# Patient Record
Sex: FEMALE | Race: WHITE | NOT HISPANIC OR LATINO | Employment: FULL TIME | URBAN - METROPOLITAN AREA
[De-identification: names, ages, dates, MRNs, and addresses within clinical notes are randomized per-mention and may not be internally consistent; named-entity substitution may affect disease eponyms.]

---

## 2023-09-25 ENCOUNTER — HOSPITAL ENCOUNTER (EMERGENCY)
Facility: HOSPITAL | Age: 28
Discharge: HOME | End: 2023-09-25
Attending: EMERGENCY MEDICINE
Payer: COMMERCIAL

## 2023-09-25 VITALS
RESPIRATION RATE: 18 BRPM | OXYGEN SATURATION: 97 % | HEIGHT: 65 IN | TEMPERATURE: 98 F | HEART RATE: 72 BPM | DIASTOLIC BLOOD PRESSURE: 87 MMHG | SYSTOLIC BLOOD PRESSURE: 128 MMHG | BODY MASS INDEX: 32.99 KG/M2 | WEIGHT: 198 LBS

## 2023-09-25 DIAGNOSIS — M54.6 ACUTE BILATERAL THORACIC BACK PAIN: ICD-10-CM

## 2023-09-25 DIAGNOSIS — R10.13 EPIGASTRIC PAIN: Primary | ICD-10-CM

## 2023-09-25 LAB
ALBUMIN SERPL BCP-MCNC: 4.7 G/DL (ref 3.5–5.2)
ALP SERPL-CCNC: 52 U/L (ref 55–135)
ALT SERPL W/O P-5'-P-CCNC: 15 U/L (ref 10–44)
ANION GAP SERPL CALC-SCNC: 8 MMOL/L (ref 8–16)
AST SERPL-CCNC: 15 U/L (ref 10–40)
B-HCG UR QL: NEGATIVE
BASOPHILS # BLD AUTO: 0.04 K/UL (ref 0–0.2)
BASOPHILS NFR BLD: 0.4 % (ref 0–1.9)
BILIRUB SERPL-MCNC: 0.5 MG/DL (ref 0.1–1)
BILIRUB UR QL STRIP: NEGATIVE
BUN SERPL-MCNC: 18 MG/DL (ref 6–20)
CALCIUM SERPL-MCNC: 9.2 MG/DL (ref 8.7–10.5)
CHLORIDE SERPL-SCNC: 103 MMOL/L (ref 95–110)
CLARITY UR: CLEAR
CO2 SERPL-SCNC: 26 MMOL/L (ref 23–29)
COLOR UR: YELLOW
CREAT SERPL-MCNC: 0.7 MG/DL (ref 0.5–1.4)
CREAT SERPL-MCNC: 1 MG/DL (ref 0.5–1.4)
CTP QC/QA: YES
DIFFERENTIAL METHOD: NORMAL
EOSINOPHIL # BLD AUTO: 0.1 K/UL (ref 0–0.5)
EOSINOPHIL NFR BLD: 1.5 % (ref 0–8)
ERYTHROCYTE [DISTWIDTH] IN BLOOD BY AUTOMATED COUNT: 12.2 % (ref 11.5–14.5)
EST. GFR  (NO RACE VARIABLE): >60 ML/MIN/1.73 M^2
GLUCOSE SERPL-MCNC: 115 MG/DL (ref 70–110)
GLUCOSE UR QL STRIP: NEGATIVE
HCT VFR BLD AUTO: 41.1 % (ref 37–48.5)
HGB BLD-MCNC: 14.2 G/DL (ref 12–16)
HGB UR QL STRIP: NEGATIVE
IMM GRANULOCYTES # BLD AUTO: 0.03 K/UL (ref 0–0.04)
IMM GRANULOCYTES NFR BLD AUTO: 0.3 % (ref 0–0.5)
KETONES UR QL STRIP: NEGATIVE
LACTATE SERPL-SCNC: 0.5 MMOL/L (ref 0.5–1.9)
LEUKOCYTE ESTERASE UR QL STRIP: NEGATIVE
LIPASE SERPL-CCNC: 34 U/L (ref 4–60)
LYMPHOCYTES # BLD AUTO: 1.7 K/UL (ref 1–4.8)
LYMPHOCYTES NFR BLD: 18.5 % (ref 18–48)
MCH RBC QN AUTO: 30.5 PG (ref 27–31)
MCHC RBC AUTO-ENTMCNC: 34.5 G/DL (ref 32–36)
MCV RBC AUTO: 88 FL (ref 82–98)
MONOCYTES # BLD AUTO: 0.7 K/UL (ref 0.3–1)
MONOCYTES NFR BLD: 7.3 % (ref 4–15)
NEUTROPHILS # BLD AUTO: 6.7 K/UL (ref 1.8–7.7)
NEUTROPHILS NFR BLD: 72 % (ref 38–73)
NITRITE UR QL STRIP: NEGATIVE
NRBC BLD-RTO: 0 /100 WBC
PH UR STRIP: 7 [PH] (ref 5–8)
PLATELET # BLD AUTO: 234 K/UL (ref 150–450)
PMV BLD AUTO: 10.5 FL (ref 9.2–12.9)
POTASSIUM SERPL-SCNC: 3.6 MMOL/L (ref 3.5–5.1)
PROT SERPL-MCNC: 7.7 G/DL (ref 6–8.4)
PROT UR QL STRIP: ABNORMAL
RBC # BLD AUTO: 4.65 M/UL (ref 4–5.4)
SAMPLE: NORMAL
SODIUM SERPL-SCNC: 137 MMOL/L (ref 136–145)
SP GR UR STRIP: 1.03 (ref 1–1.03)
URN SPEC COLLECT METH UR: ABNORMAL
UROBILINOGEN UR STRIP-ACNC: ABNORMAL EU/DL
WBC # BLD AUTO: 9.31 K/UL (ref 3.9–12.7)

## 2023-09-25 PROCEDURE — 25500020 PHARM REV CODE 255: Performed by: STUDENT IN AN ORGANIZED HEALTH CARE EDUCATION/TRAINING PROGRAM

## 2023-09-25 PROCEDURE — 81025 URINE PREGNANCY TEST: CPT | Performed by: STUDENT IN AN ORGANIZED HEALTH CARE EDUCATION/TRAINING PROGRAM

## 2023-09-25 PROCEDURE — 81003 URINALYSIS AUTO W/O SCOPE: CPT | Performed by: STUDENT IN AN ORGANIZED HEALTH CARE EDUCATION/TRAINING PROGRAM

## 2023-09-25 PROCEDURE — 85025 COMPLETE CBC W/AUTO DIFF WBC: CPT | Performed by: STUDENT IN AN ORGANIZED HEALTH CARE EDUCATION/TRAINING PROGRAM

## 2023-09-25 PROCEDURE — 82565 ASSAY OF CREATININE: CPT | Mod: 59

## 2023-09-25 PROCEDURE — 25000003 PHARM REV CODE 250: Performed by: STUDENT IN AN ORGANIZED HEALTH CARE EDUCATION/TRAINING PROGRAM

## 2023-09-25 PROCEDURE — 83605 ASSAY OF LACTIC ACID: CPT | Performed by: STUDENT IN AN ORGANIZED HEALTH CARE EDUCATION/TRAINING PROGRAM

## 2023-09-25 PROCEDURE — 80053 COMPREHEN METABOLIC PANEL: CPT | Performed by: STUDENT IN AN ORGANIZED HEALTH CARE EDUCATION/TRAINING PROGRAM

## 2023-09-25 PROCEDURE — 99281 EMR DPT VST MAYX REQ PHY/QHP: CPT | Mod: 25

## 2023-09-25 PROCEDURE — 83690 ASSAY OF LIPASE: CPT | Performed by: STUDENT IN AN ORGANIZED HEALTH CARE EDUCATION/TRAINING PROGRAM

## 2023-09-25 RX ORDER — METHOCARBAMOL 500 MG/1
500 TABLET, FILM COATED ORAL 4 TIMES DAILY
Qty: 40 TABLET | Refills: 0 | Status: SHIPPED | OUTPATIENT
Start: 2023-09-25 | End: 2023-10-05

## 2023-09-25 RX ORDER — METHOCARBAMOL 500 MG/1
500 TABLET, FILM COATED ORAL
Status: COMPLETED | OUTPATIENT
Start: 2023-09-25 | End: 2023-09-25

## 2023-09-25 RX ORDER — IBUPROFEN 600 MG/1
600 TABLET ORAL 3 TIMES DAILY
Qty: 15 TABLET | Refills: 0 | Status: SHIPPED | OUTPATIENT
Start: 2023-09-25 | End: 2023-09-30

## 2023-09-25 RX ORDER — FAMOTIDINE 20 MG/1
20 TABLET, FILM COATED ORAL ONCE
Status: COMPLETED | OUTPATIENT
Start: 2023-09-25 | End: 2023-09-25

## 2023-09-25 RX ADMIN — IOHEXOL 100 ML: 350 INJECTION, SOLUTION INTRAVENOUS at 01:09

## 2023-09-25 RX ADMIN — METHOCARBAMOL 500 MG: 500 TABLET ORAL at 03:09

## 2023-09-25 RX ADMIN — IBUPROFEN 600 MG: 200 TABLET, FILM COATED ORAL at 12:09

## 2023-09-25 RX ADMIN — FAMOTIDINE 20 MG: 20 TABLET ORAL at 03:09

## 2023-09-25 NOTE — Clinical Note
"Kandi Ruffblake Gonzáles was seen and treated in our emergency department on 9/25/2023.  She may return to work on 09/26/2023.       If you have any questions or concerns, please don't hesitate to call.      Diane Anderson MD"

## 2023-09-25 NOTE — ED PROVIDER NOTES
Encounter Date: 9/25/2023       History     Chief Complaint   Patient presents with    Back Pain     Upper back pain just started at 2230 tonight     HPI  Kandi Gonzáles is a 27-year-old female who presents with bilateral thoracic back pain that happened when she woke up this evening after she turned over to her side.  She also had 1 episode vomiting that was nonbilious and nonbloody is complaining of some epigastric pain.  No constipation/diarrhea, recent trauma, heavy lifting, chest pain, shortness of breath, any other complaints.  Review of patient's allergies indicates:  No Known Allergies  No past medical history on file.  No past surgical history on file.  No family history on file.     Review of Systems   All other systems reviewed and are negative.      Physical Exam     Initial Vitals [09/25/23 0014]   BP Pulse Resp Temp SpO2   128/87 104 18 97.8 °F (36.6 °C) 97 %      MAP       --         Physical Exam    Nursing note and vitals reviewed.  Constitutional: She appears well-developed and well-nourished.   HENT:   Head: Normocephalic and atraumatic.   Right Ear: External ear normal.   Left Ear: External ear normal.   Nose: Nose normal.   Mouth/Throat: Oropharynx is clear and moist.   Eyes: Conjunctivae are normal. Pupils are equal, round, and reactive to light.   Neck: Neck supple.   Normal range of motion.  Cardiovascular:  Normal rate and regular rhythm.           Pulmonary/Chest: No respiratory distress.   Abdominal: Abdomen is soft. She exhibits no distension. There is abdominal tenderness (Epigastric and mild right upper quadrant without Quach sign).   Musculoskeletal:         General: Normal range of motion.      Cervical back: Normal range of motion and neck supple.     Neurological: She is alert.   Skin: Skin is warm and dry.         ED Course   Procedures  Labs Reviewed   URINALYSIS, REFLEX TO URINE CULTURE - Abnormal; Notable for the following components:       Result Value    Protein, UA Trace  (*)     Urobilinogen, UA 2.0-3.0 (*)     All other components within normal limits    Narrative:     Specimen Source->Urine   COMPREHENSIVE METABOLIC PANEL - Abnormal; Notable for the following components:    Glucose 115 (*)     Alkaline Phosphatase 52 (*)     All other components within normal limits   CBC W/ AUTO DIFFERENTIAL   LIPASE   LACTIC ACID, PLASMA   D DIMER, QUANTITATIVE   POCT URINE PREGNANCY   ISTAT CREATININE   POCT CREATININE          Imaging Results              CT Abdomen Pelvis With Contrast (Final result)  Result time 09/25/23 02:27:07      Final result by Trey Burnette DO (09/25/23 02:27:07)                   Narrative:    EXAM DESCRIPTION:  CT ABDOMEN PELVIS WITH CONTRAST  RadLex: CT ABDOMEN PELVIS WITH IV CONTRAST    CLINICAL HISTORY:  Epigastric pain.    TECHNIQUE:  CT of the abdomen and pelvis using intravenous Omni 350, 100 mL.  All CT scans at this facility use dose modulation, iterative reconstruction, and/or weight based dosing when appropriate to reduce radiation dose to as low as reasonably achievable.    COMPARISON: None.    FINDINGS:    The visualized lower thoracic structures appear unremarkable.    The liver, spleen, pancreas, adrenal glands and kidneys appear unremarkable. Question slight prominence of the gallbladder wall. No retroperitoneal or mesenteric lymphadenopathy is identified. No abdominal aortic aneurysm is seen. No bowel obstruction is identified. There is no free intraperitoneal air. The appendix is surgically absent. No free fluid is identified. There is a heterogeneous 3.9 cm right adnexal cyst. There is a smaller adjacent 3.1 cm cyst. There is a small amount of free fluid at the right adnexa. Urinary bladder is incompletely distended. No acute fracture is seen.    IMPRESSION:  1.  Question slight gallbladder wall prominence. Follow-up ultrasound could be performed if indicated.  2.  A couple right adnexal cysts, the largest measuring 3.9 cm, with small amount of  adjacent free fluid.    Electronically signed by:  Trey Burnette DO  9/25/2023 2:27 AM CDT Workstation: [id number]                                     Medications   famotidine tablet 20 mg (has no administration in time range)   methocarbamoL tablet 500 mg (has no administration in time range)   ibuprofen tablet 600 mg (600 mg Oral Given 9/25/23 0058)   iohexoL (OMNIPAQUE 350) injection 100 mL (100 mLs Intravenous Given 9/25/23 0151)     Medical Decision Making  Differential included musculoskeletal pain, GERD, pancreatitis, cholecystitis, gastritis, gastroenteritis, electrolyte abnormality, UTI/pyelo.  Her lab workup showed no significant abnormalities in the CT abdomen and pelvis on my independent interpretation showed no acute intra-abdominal pathology.  After discussion with patient, she wishes to leave AMA at this time and does not want to undergo testing with a D-dimer to rule out potential pulmonary embolism as the cause for her symptoms.  She feels much better at this time after having received ibuprofen, Pepcid, and Robaxin.  She understands the risks and benefits of leaving and wishes to AMA.  She is stable and ready for AMA at this time.    Amount and/or Complexity of Data Reviewed  Labs: ordered. Decision-making details documented in ED Course.  Radiology: ordered and independent interpretation performed.     Details: See above    Risk  Prescription drug management.               ED Course as of 09/25/23 0331   Mon Sep 25, 2023   0038 Preg Test, Ur: Negative [ELDER]   0110 Leukocytes, UA: Negative [ELDER]   0110 NITRITE UA: Negative [ELDER]   0131 POC Creatinine: 0.7 [ELDER]   0152 Creatinine: 1.0 [ELDER]   0152 WBC: 9.31 [ELDER]   0152 Hemoglobin: 14.2 [ELDER]   0152 Lipase: 34 [ELDER]   0236 BILIRUBIN TOTAL: 0.5 [ELDER]      ED Course User Index  [ELDER] Júnior Carranza MD                    Clinical Impression:   Final diagnoses:  [M54.6] Acute bilateral thoracic back pain  [R10.13] Epigastric pain (Primary)        ED Disposition  Condition    Discharge Stable          ED Prescriptions    None       Follow-up Information       Follow up With Specialties Details Why Contact Info    Primary care doctor  Go to  As needed              Júnior Carranza MD  Resident  09/25/23 [id number]

## 2023-09-25 NOTE — DISCHARGE INSTRUCTIONS
If your pain worsens, you start getting fever, or have any other concerning symptoms please come back to the emergency department.

## 2024-02-29 ENCOUNTER — OFFICE VISIT (OUTPATIENT)
Dept: GASTROENTEROLOGY | Facility: CLINIC | Age: 29
End: 2024-02-29
Payer: COMMERCIAL

## 2024-02-29 VITALS
HEART RATE: 64 BPM | HEIGHT: 65 IN | BODY MASS INDEX: 33.02 KG/M2 | SYSTOLIC BLOOD PRESSURE: 114 MMHG | DIASTOLIC BLOOD PRESSURE: 55 MMHG | WEIGHT: 198.19 LBS

## 2024-02-29 DIAGNOSIS — K59.09 INTERMITTENT CONSTIPATION: ICD-10-CM

## 2024-02-29 DIAGNOSIS — R12 WATERBRASH: ICD-10-CM

## 2024-02-29 DIAGNOSIS — R19.5 LOOSE STOOLS: ICD-10-CM

## 2024-02-29 DIAGNOSIS — R11.2 NAUSEA AND VOMITING, UNSPECIFIED VOMITING TYPE: ICD-10-CM

## 2024-02-29 DIAGNOSIS — R10.11 RUQ PAIN: ICD-10-CM

## 2024-02-29 DIAGNOSIS — R14.2 BELCHING: ICD-10-CM

## 2024-02-29 DIAGNOSIS — R19.8 IRREGULAR BOWEL HABITS: ICD-10-CM

## 2024-02-29 DIAGNOSIS — R10.13 EPIGASTRIC PAIN: Primary | ICD-10-CM

## 2024-02-29 DIAGNOSIS — K21.9 GASTROESOPHAGEAL REFLUX DISEASE, UNSPECIFIED WHETHER ESOPHAGITIS PRESENT: ICD-10-CM

## 2024-02-29 DIAGNOSIS — R12 HEARTBURN: ICD-10-CM

## 2024-02-29 PROCEDURE — 1160F RVW MEDS BY RX/DR IN RCRD: CPT | Mod: CPTII,S$GLB,,

## 2024-02-29 PROCEDURE — 3008F BODY MASS INDEX DOCD: CPT | Mod: CPTII,S$GLB,,

## 2024-02-29 PROCEDURE — 3074F SYST BP LT 130 MM HG: CPT | Mod: CPTII,S$GLB,,

## 2024-02-29 PROCEDURE — 99204 OFFICE O/P NEW MOD 45 MIN: CPT | Mod: S$GLB,,,

## 2024-02-29 PROCEDURE — 1159F MED LIST DOCD IN RCRD: CPT | Mod: CPTII,S$GLB,,

## 2024-02-29 PROCEDURE — 3078F DIAST BP <80 MM HG: CPT | Mod: CPTII,S$GLB,,

## 2024-02-29 PROCEDURE — 99999 PR PBB SHADOW E&M-EST. PATIENT-LVL III: CPT | Mod: PBBFAC,,,

## 2024-02-29 RX ORDER — OMEPRAZOLE 10 MG/1
10 CAPSULE, DELAYED RELEASE ORAL DAILY
COMMUNITY
End: 2024-02-29

## 2024-02-29 RX ORDER — BUPROPION HYDROCHLORIDE 150 MG/1
150 TABLET, EXTENDED RELEASE ORAL 2 TIMES DAILY
Status: ON HOLD | COMMUNITY
End: 2024-04-25 | Stop reason: CLARIF

## 2024-02-29 RX ORDER — ONDANSETRON 4 MG/1
4 TABLET, ORALLY DISINTEGRATING ORAL 2 TIMES DAILY PRN
Qty: 30 TABLET | Refills: 0 | Status: SHIPPED | OUTPATIENT
Start: 2024-02-29 | End: 2024-03-15

## 2024-02-29 RX ORDER — OMEPRAZOLE 40 MG/1
40 CAPSULE, DELAYED RELEASE ORAL DAILY
Qty: 90 CAPSULE | Refills: 1 | Status: SHIPPED | OUTPATIENT
Start: 2024-02-29 | End: 2024-08-27

## 2024-02-29 NOTE — PROGRESS NOTES
Subjective:       Patient ID: Kandi Gonzáles is a 28 y.o. female Body mass index is 32.98 kg/m².    Chief Complaint: Gastroesophageal Reflux    This patient is new to me.  Referring Provider: [facility]referral Self for GERD, abdominal pain.             Gastroesophageal Reflux  She complains of abdominal pain (epigastric pain feels like a pinch burning sensation, radiates to RUQ, occurs daily, worsened by not eating, also worsened by eating food, improves w/ eating bread), belching, heartburn, nausea (new onset of n/v will have up to 3-4 episodes per day up to 3 times a week, will vomit up food that she attempts to eat, no coffee grounds no blood seen) and water brash. She reports no chest pain, no choking, no coughing, no dysphagia, no early satiety, no globus sensation or no hoarse voice. This is a recurrent problem. The problem occurs frequently. The problem has been waxing and waning. The heartburn is located in the substernum. The symptoms are aggravated by certain foods. Pertinent negatives include no anemia, fatigue, melena, muscle weakness, orthopnea or weight loss. Patient was evaluated for epigastric pain and a CT scan of the abdomen was performed.  9/25/2023  CT ABDOMEN PELVIS WITH CONTRAST  IMPRESSION:  1.  Question slight gallbladder wall prominence. Follow-up ultrasound could be performed if indicated.  -reports has not had a colonoscopy or an EGD in the past, states her mom has history of Barretts esophagus, denies any family history of colon cancer or IBD  -patient reports typically has a BM daily rates stool type 3-4 on [place] stool scale, denies straining, feels empty, reports in the past month started skipping a day or 2 intermittently and then will have up to 4 BMs type 5-6 on [place] stool scale, denies any changes in diet, denies any gluten sensitivities or dairy sensitivities, denies any nocturnal diarrhea, reports fecal urgency at the times that she has a looser stools, patient does work in the  \[employer]\"", reports had a Z-Diego in November, does not take anything for bowels, does not drink a lot of water, CT of abdomen in September per imaging shows a lot of stool on left side of the colon, denies lower abdominal pain, denies hematochezia. There are no known risk factors. She has tried a PPI (Has been taking omeprazole OTC 10 mg as needed) for the symptoms. The treatment provided mild relief. Past procedures do not include an abdominal ultrasound, an EGD (Has not had an EGD in the past), esophageal manometry, esophageal pH monitoring, H. pylori antibody titer or a UGI. Past invasive treatments do not include gastroplasty, gastroplication or reflux surgery.       Review of Systems   Constitutional:  Negative for fatigue and weight loss.   HENT:  Negative for hoarse voice.    Respiratory:  Negative for cough and choking.    Cardiovascular:  Negative for chest pain.   Gastrointestinal:  Positive for abdominal pain (epigastric pain feels like a pinch burning sensation, radiates to RUQ, occurs daily, worsened by not eating, also worsened by eating food, improves w/ eating bread), heartburn and nausea (new onset of n/v will have up to 3-4 episodes per day up to 3 times a week, will vomit up food that she attempts to eat, no coffee grounds no blood seen). Negative for dysphagia and melena.   Musculoskeletal:  Negative for muscle weakness.         No LMP recorded.  Past Medical History:   Diagnosis Date    GERD (gastroesophageal reflux disease)      Past Surgical History:   Procedure Laterality Date    APPENDECTOMY      [id number]     Family History   Problem Relation Age of Onset    Colon cancer Neg Hx     Esophageal cancer Neg Hx     Liver cancer Neg Hx         Wt Readings from Last 10 Encounters:   02/29/24 89.9 kg (198 lb 3.1 oz)   09/25/23 89.8 kg (198 lb)     Lab Results   Component Value Date    WBC 9.31 09/25/2023    HGB 14.2 09/25/2023    HCT 41.1 09/25/2023    MCV 88 09/25/2023     09/25/2023  "    CMP  Sodium   Date Value Ref Range Status   09/25/2023 137 136 - 145 mmol/L Final     Potassium   Date Value Ref Range Status   09/25/2023 3.6 3.5 - 5.1 mmol/L Final     Chloride   Date Value Ref Range Status   09/25/2023 103 95 - 110 mmol/L Final     CO2   Date Value Ref Range Status   09/25/2023 26 23 - 29 mmol/L Final     Glucose   Date Value Ref Range Status   09/25/2023 115 (H) 70 - 110 mg/dL Final     BUN   Date Value Ref Range Status   09/25/2023 18 6 - 20 mg/dL Final     Creatinine   Date Value Ref Range Status   09/25/2023 1.0 0.5 - 1.4 mg/dL Final     Calcium   Date Value Ref Range Status   09/25/2023 9.2 8.7 - 10.5 mg/dL Final     Total Protein   Date Value Ref Range Status   09/25/2023 7.7 6.0 - 8.4 g/dL Final     Albumin   Date Value Ref Range Status   09/25/2023 4.7 3.5 - 5.2 g/dL Final     Total Bilirubin   Date Value Ref Range Status   09/25/2023 0.5 0.1 - 1.0 mg/dL Final     Comment:     For infants and newborns, interpretation of results should be based  on gestational age, weight and in agreement with clinical  observations.    Premature Infant recommended reference ranges:  Up to 24 hours.............<8.0 mg/dL  Up to 48 hours............<12.0 mg/dL  3-5 days..................<15.0 mg/dL  6-29 days.................<15.0 mg/dL       Alkaline Phosphatase   Date Value Ref Range Status   09/25/2023 52 (L) 55 - 135 U/L Final     AST   Date Value Ref Range Status   09/25/2023 15 10 - 40 U/L Final     ALT   Date Value Ref Range Status   09/25/2023 15 10 - 44 U/L Final     Anion Gap   Date Value Ref Range Status   09/25/2023 8 8 - 16 mmol/L Final     Lab Results   Component Value Date    LIPASE 34 09/25/2023     No results found for: "LIPASERES"  No results found for: "TSH"    Reviewed prior medical records including hospital encounter 09/25/2023 radiology report of CT abdomen 9/25/23, ultrasound abdomen 07/02/2018 & endoscopy history (see surgical history/procedures).    Objective:      Physical " Exam  Vitals and nursing note reviewed.   Constitutional:       Appearance: Normal appearance. She is normal weight.   Cardiovascular:      Rate and Rhythm: Normal rate and regular rhythm.      Heart sounds: Normal heart sounds.   Pulmonary:      Breath sounds: Normal breath sounds.   Abdominal:      General: Bowel sounds are normal.      Palpations: Abdomen is soft.      Tenderness: There is abdominal tenderness in the epigastric area.   Skin:     General: Skin is warm and dry.      Coloration: Skin is not jaundiced.   Neurological:      Mental Status: She is alert and oriented to person, place, and time.   Psychiatric:         Mood and Affect: Mood normal.         Behavior: Behavior normal.         Assessment:       1. Epigastric pain    2. RUQ pain    3. Gastroesophageal reflux disease, unspecified whether esophagitis present    4. Belching    5. Heartburn    6. Waterbrash    7. Nausea and vomiting, unspecified vomiting type    8. Intermittent constipation    9. Loose stools    10. Irregular bowel habits        Plan:       Epigastric pain  -     Case Request Endoscopy: EGD (ESOPHAGOGASTRODUODENOSCOPY)  - schedule EGD, discussed procedure with patient, including risks and benefits, patient verbalized understanding  -  START   omeprazole (PRILOSEC) 40 MG capsule; Take 1 capsule (40 mg total) by mouth Daily.  Dispense: 90 capsule; Refill: 1  -consider Carafate  -follow GERD lifestyle modifications  -complete ultrasound of abdomen    Nausea and vomiting, unspecified vomiting type  -     Case Request Endoscopy: EGD (ESOPHAGOGASTRODUODENOSCOPY)  - start Zofran 4 mg b.i.d. as needed for nausea  -complete ultrasound of abdomen  -start on PPI  -follow GERD lifestyle modifications    RUQ pain  -     US Abdomen Complete; Future; Expected date: 02/29/2024  -     Case Request Endoscopy: EGD (ESOPHAGOGASTRODUODENOSCOPY)    Gastroesophageal reflux disease, unspecified whether esophagitis present, belching, heartburn,  waterbrash  - START    omeprazole (PRILOSEC) 40 MG capsule; Take 1 capsule (40 mg total) by mouth Daily.  Dispense: 90 capsule; Refill: 1  -     Case Request Endoscopy: EGD (ESOPHAGOGASTRODUODENOSCOPY)   -Take PPI 30min-1hr before eating breakfast  -Educated patient on lifestyle modifications to help control/reduce reflux/abdominal pain including: avoid large meals, avoid eating within 2-3 hours of bedtime (avoid late night eating & lying down soon after eating), elevate head of bed if nocturnal symptoms are present, smoking cessation (if current smoker), & weight loss (if overweight).   -Educated to avoid known foods which trigger reflux symptoms & to minimize/avoid high-fat foods, chocolate, caffeine, citrus, alcohol, & tomato products.  -Advised to avoid/limit use of NSAID's, since they can cause GI upset, bleeding, and/or ulcers. If needed, take with food.     Intermittent constipation,Irregular bowel habits  -possibility her symptoms being a constipation with overflow diarrhea  -Recommend daily exercise as tolerated, adequate water intake (six 8-oz glasses of water daily), and high fiber diet. OTC fiber supplements are recommended if diet does not reach daily fiber goal (20-30 grams daily), such as Metamucil, Citrucel, or FiberCon (take as directed, separate from other oral medications by >2 hours).  -Recommend trying OTC MiraLax once daily (17g PO) as directed  -If no improvement with above recommendations, try intermittently dosed Dulcolax OTC as directed (every 3-4 days) PRN to facilitate bowel movements  -If no relief with this, consider adding a emollient laxative (castor oil or mineral oil) +/- enema  -If still no improvement with these measures, call/follow-up  -if symptoms do not improve or worsen will schedule a colonoscopy, patient verbalized understanding     Loose stools  -     Giardia / Cryptosporidum, EIA; Future; Expected date: 02/29/2024  -     Stool Exam-Ova,Cysts,Parasites; Future; Expected  date: 02/29/2024  -     Clostridium difficile EIA; Future; Expected date: 02/29/2024  -     Stool culture; Future; Expected date: 02/29/2024   - recommend OTC probiotic, such as Florastor or Culturelle, taken as directed on packaging  - avoid lactose, alcohol, & caffeine  - avoid known triggers        Follow up if symptoms worsen or fail to improve.      If no improvement in symptoms or symptoms worsen, call/follow-up at clinic or go to ER.       [facility] - GASTROENTEROLOGY  [facility], [place]     Dictation software program was used for this note. Please expect some simple typographical  errors in this note.    Encounter includes face to face time and non-face to face time preparing to see the patient (eg, review of tests), obtaining and/or reviewing separately obtained history, documenting clinical information in the electronic or other health record, independently interpreting results (not separately reported) and communicating results to the patient/family/caregiver, or care coordination (not separately reported).

## 2024-03-08 ENCOUNTER — HOSPITAL ENCOUNTER (OUTPATIENT)
Dept: RADIOLOGY | Facility: HOSPITAL | Age: 29
Discharge: HOME | End: 2024-03-08
Payer: COMMERCIAL

## 2024-03-08 DIAGNOSIS — R10.11 RUQ PAIN: ICD-10-CM

## 2024-03-08 PROCEDURE — 76700 US EXAM ABDOM COMPLETE: CPT | Mod: 26,,, | Performed by: RADIOLOGY

## 2024-03-08 PROCEDURE — 76700 US EXAM ABDOM COMPLETE: CPT | Mod: TC

## 2024-03-11 DIAGNOSIS — K80.20 GALLSTONES: Primary | ICD-10-CM

## 2024-03-19 ENCOUNTER — LAB VISIT (OUTPATIENT)
Dept: LAB | Facility: HOSPITAL | Age: 29
End: 2024-03-19
Attending: SURGERY
Payer: COMMERCIAL

## 2024-03-19 ENCOUNTER — OFFICE VISIT (OUTPATIENT)
Dept: SURGERY | Facility: CLINIC | Age: 29
End: 2024-03-19
Payer: COMMERCIAL

## 2024-03-19 VITALS — TEMPERATURE: 98 F | DIASTOLIC BLOOD PRESSURE: 74 MMHG | HEART RATE: 73 BPM | SYSTOLIC BLOOD PRESSURE: 112 MMHG

## 2024-03-19 DIAGNOSIS — K80.20 GALLSTONES: ICD-10-CM

## 2024-03-19 LAB
ALBUMIN SERPL BCP-MCNC: 4.2 G/DL (ref 3.5–5.2)
ALP SERPL-CCNC: 51 U/L (ref 55–135)
ALT SERPL W/O P-5'-P-CCNC: 21 U/L (ref 10–44)
ANION GAP SERPL CALC-SCNC: 8 MMOL/L (ref 8–16)
AST SERPL-CCNC: 18 U/L (ref 10–40)
BASOPHILS # BLD AUTO: 0.03 K/UL (ref 0–0.2)
BASOPHILS NFR BLD: 0.5 % (ref 0–1.9)
BILIRUB SERPL-MCNC: 0.4 MG/DL (ref 0.1–1)
BUN SERPL-MCNC: 13 MG/DL (ref 6–20)
CALCIUM SERPL-MCNC: 9.4 MG/DL (ref 8.7–10.5)
CHLORIDE SERPL-SCNC: 107 MMOL/L (ref 95–110)
CO2 SERPL-SCNC: 25 MMOL/L (ref 23–29)
CREAT SERPL-MCNC: 0.8 MG/DL (ref 0.5–1.4)
DIFFERENTIAL METHOD BLD: NORMAL
EOSINOPHIL # BLD AUTO: 0.1 K/UL (ref 0–0.5)
EOSINOPHIL NFR BLD: 1.7 % (ref 0–8)
ERYTHROCYTE [DISTWIDTH] IN BLOOD BY AUTOMATED COUNT: 12.1 % (ref 11.5–14.5)
EST. GFR  (NO RACE VARIABLE): >60 ML/MIN/1.73 M^2
GLUCOSE SERPL-MCNC: 121 MG/DL (ref 70–110)
HCT VFR BLD AUTO: 38.8 % (ref 37–48.5)
HGB BLD-MCNC: 13.2 G/DL (ref 12–16)
IMM GRANULOCYTES # BLD AUTO: 0.01 K/UL (ref 0–0.04)
IMM GRANULOCYTES NFR BLD AUTO: 0.2 % (ref 0–0.5)
LYMPHOCYTES # BLD AUTO: 1.7 K/UL (ref 1–4.8)
LYMPHOCYTES NFR BLD: 28.2 % (ref 18–48)
MCH RBC QN AUTO: 30.6 PG (ref 27–31)
MCHC RBC AUTO-ENTMCNC: 34 G/DL (ref 32–36)
MCV RBC AUTO: 90 FL (ref 82–98)
MONOCYTES # BLD AUTO: 0.6 K/UL (ref 0.3–1)
MONOCYTES NFR BLD: 9.3 % (ref 4–15)
NEUTROPHILS # BLD AUTO: 3.6 K/UL (ref 1.8–7.7)
NEUTROPHILS NFR BLD: 60.1 % (ref 38–73)
NRBC BLD-RTO: 0 /100 WBC
PLATELET # BLD AUTO: 209 K/UL (ref 150–450)
PMV BLD AUTO: 11.1 FL (ref 9.2–12.9)
POTASSIUM SERPL-SCNC: 3.5 MMOL/L (ref 3.5–5.1)
PROT SERPL-MCNC: 7.3 G/DL (ref 6–8.4)
RBC # BLD AUTO: 4.31 M/UL (ref 4–5.4)
SODIUM SERPL-SCNC: 140 MMOL/L (ref 136–145)
WBC # BLD AUTO: 5.93 K/UL (ref 3.9–12.7)

## 2024-03-19 PROCEDURE — 80053 COMPREHEN METABOLIC PANEL: CPT | Performed by: SURGERY

## 2024-03-19 PROCEDURE — 3078F DIAST BP <80 MM HG: CPT | Mod: CPTII,S$GLB,, | Performed by: SURGERY

## 2024-03-19 PROCEDURE — 1159F MED LIST DOCD IN RCRD: CPT | Mod: CPTII,S$GLB,, | Performed by: SURGERY

## 2024-03-19 PROCEDURE — 3074F SYST BP LT 130 MM HG: CPT | Mod: CPTII,S$GLB,, | Performed by: SURGERY

## 2024-03-19 PROCEDURE — 36415 COLL VENOUS BLD VENIPUNCTURE: CPT | Performed by: SURGERY

## 2024-03-19 PROCEDURE — 1160F RVW MEDS BY RX/DR IN RCRD: CPT | Mod: CPTII,S$GLB,, | Performed by: SURGERY

## 2024-03-19 PROCEDURE — 85025 COMPLETE CBC W/AUTO DIFF WBC: CPT | Performed by: SURGERY

## 2024-03-19 PROCEDURE — 99204 OFFICE O/P NEW MOD 45 MIN: CPT | Mod: S$GLB,,, | Performed by: SURGERY

## 2024-03-19 PROCEDURE — 99999 PR PBB SHADOW E&M-EST. PATIENT-LVL III: CPT | Mod: PBBFAC,,, | Performed by: SURGERY

## 2024-03-19 RX ORDER — CEFAZOLIN SODIUM 2 G/50ML
2 SOLUTION INTRAVENOUS
Status: CANCELLED | OUTPATIENT
Start: 2024-04-25

## 2024-03-19 RX ORDER — SODIUM CHLORIDE 9 MG/ML
INJECTION, SOLUTION INTRAVENOUS CONTINUOUS
Status: CANCELLED | OUTPATIENT
Start: 2024-04-25

## 2024-03-19 NOTE — PROGRESS NOTES
Subjective:       Patient ID: Kandi Gonzáles is a 28 y.o. female.    Chief Complaint: Gall Bladder Problem      HPI   28-year-old female presents with a six-month history of right upper quadrant pain which radiates into her back.  It can be severe at times.  It is associated with food especially fatty food.  She denies any history of fever or chills.  She has never been jaundiced.  Initially a CT scan showed a dilated gallbladder and then an ultrasound revealed cholelithiasis without evidence of acute cholecystitis.  Her laboratory studies late last year were within normal limits.  She is interested in considering a laparoscopic cholecystectomy.  Her symptoms have continued.  She is not having pain currently.    Past Medical History:   Diagnosis Date    GERD (gastroesophageal reflux disease)      Past Surgical History:   Procedure Laterality Date    APPENDECTOMY      [id number]         Current Outpatient Medications:     buPROPion (WELLBUTRIN SR) 150 MG TBSR 12 hr tablet, Take 150 mg by mouth 2 (two) times daily., Disp: , Rfl:     omeprazole (PRILOSEC) 40 MG capsule, Take 1 capsule (40 mg total) by mouth Daily., Disp: 90 capsule, Rfl: 1    Review of patient's allergies indicates:   Allergen Reactions    Covid-19 vaccine, [id number], lnp-s (moderna) Rash       Family History   Problem Relation Age of Onset    Colon cancer Neg Hx     Esophageal cancer Neg Hx     Liver cancer Neg Hx      Social History     Socioeconomic History    Marital status:    Tobacco Use    Smoking status: Former     Types: Vaping with nicotine     Passive exposure: Never    Smokeless tobacco: Never       Review of Systems   HENT: Negative.     Respiratory: Negative.     Cardiovascular: Negative.    Gastrointestinal:  Positive for abdominal pain, nausea and vomiting.     Objective:     Physical Exam  Constitutional:       General: She is not in acute distress.     Appearance: She is well-developed.   HENT:      Head: Normocephalic and  atraumatic.   Eyes:      General: No scleral icterus.     Conjunctiva/sclera: Conjunctivae normal.      Pupils: Pupils are equal, round, and reactive to light.   Neck:      Thyroid: No thyromegaly.   Cardiovascular:      Rate and Rhythm: Normal rate and regular rhythm.      Heart sounds: No murmur heard.  Pulmonary:      Effort: Pulmonary effort is normal.      Breath sounds: Normal breath sounds. No wheezing or rales.   Abdominal:      General: Bowel sounds are normal. There is no distension.      Palpations: Abdomen is soft. There is no mass.      Tenderness: There is abdominal tenderness (Very mild right upper quadrant tenderness.). There is no guarding or rebound.      Hernia: No hernia is present.   Musculoskeletal:         General: Normal range of motion.      Cervical back: Normal range of motion.   Lymphadenopathy:      Cervical: No cervical adenopathy.   Skin:     General: Skin is warm and dry.      Findings: No erythema or rash.   Neurological:      Mental Status: She is alert and oriented to person, place, and time.   Psychiatric:         Behavior: Behavior normal.       Ultrasound reveals cholelithiasis without evidence of acute cholecystitis.  CT scan images were reviewed.  Liver associated enzymes, alk-phos, and total bilirubin are within normal limits.  Lipase was normal.    Assessment:     Encounter Diagnosis   Name Primary?    Gallstones        Plan:      Symptomatic cholelithiasis.  Plan laparoscopic, possible open cholecystectomy.  Risks and benefits of the planned procedure were discussed at length with the patient.  Risks and benefits of not proceeding with the procedure were discussed as well. All questions were answered. The patient expressed clear understanding and would like to proceed with the procedure as discussed.

## 2024-03-19 NOTE — PATIENT INSTRUCTIONS
Your procedure has been scheduled at:  [facility]pre-admit nurse  [phone]  [address] [place] [address] [place]  [address]    DAY   Thursday    DATE        Someone from the hospital will call you the evening before surgery ( it may be after 5 pm) to let you know what time you need to be at the hospital for surgery.                                               1:  DO NOT EAT OR DRINK ANYTHING AFTER MIDNIGHT THE NIGHT BEFORE SURGERY.     2:  You will need to stop all blood thinners(aspirin, aleve, Ibuprofen) 7 days prior to surgery except Eliquis which is 48 hours.  ---Tylenol is ok    3:  Pre-admit nurse will go over your medicines and let you know which ones not to take the morning of surgery    4:  Plan to have someone drive you home after you are released from the hospital.  You WILL NOT be able to drive yourself.    5:  If you have any questions or need to change your surgery date, please call Jeanine or Eric  at [phone] or [phone]        AFTER SURGERY:    **You can shower 24-48 hours after surgery, incision can get wet but do not soak. You  may have bandages and steri strips or you may just have glue over the incision with no bandage.  The glue will peel away after a few days, steri strips you can remove after 1 week.   **After surgery you will get pain medication to take every 6 hours.  If you are not getting relief you can take the pain medicine every 4 hours and you can also take 2 tabs  if needed.  Also if you are able to take Ibuprofen you can take 600 mg every 6 hours.  Applying an ice pack for 15-20 minutes 3-4 times a day will be helpful.  **If you have not had a bowel movement within 3 days after surgery you may take a laxative of your choice.  ** Do not lift anything over 5-10 pounds.    You need to have a follow up appointment 2 weeks after surgery, call the office to schedule or if you have questions or concerns.

## 2024-04-22 ENCOUNTER — TELEPHONE (OUTPATIENT)
Dept: GASTROENTEROLOGY | Facility: CLINIC | Age: 29
End: 2024-04-22
Payer: COMMERCIAL

## 2024-04-22 NOTE — TELEPHONE ENCOUNTER
Call placed to Ms. Gonzáles I regards to EGD on 5/3. No answer, left message to return call.     MD out of office on 5/3. EGD needs to be rescheduled.

## 2024-04-24 ENCOUNTER — ANESTHESIA EVENT (OUTPATIENT)
Dept: SURGERY | Facility: HOSPITAL | Age: 29
End: 2024-04-24
Payer: COMMERCIAL

## 2024-04-25 ENCOUNTER — HOSPITAL ENCOUNTER (OUTPATIENT)
Facility: HOSPITAL | Age: 29
Discharge: HOME | End: 2024-04-25
Attending: SURGERY | Admitting: SURGERY
Payer: COMMERCIAL

## 2024-04-25 ENCOUNTER — ANESTHESIA (OUTPATIENT)
Dept: SURGERY | Facility: HOSPITAL | Age: 29
End: 2024-04-25
Payer: COMMERCIAL

## 2024-04-25 DIAGNOSIS — K80.20 GALLSTONES: Primary | ICD-10-CM

## 2024-04-25 DIAGNOSIS — Z01.818 PREOP TESTING: ICD-10-CM

## 2024-04-25 LAB
B-HCG UR QL: NEGATIVE
CTP QC/QA: YES

## 2024-04-25 PROCEDURE — 71000015 HC POSTOP RECOV 1ST HR: Performed by: SURGERY

## 2024-04-25 PROCEDURE — 47562 LAPAROSCOPIC CHOLECYSTECTOMY: CPT | Mod: ,,, | Performed by: SURGERY

## 2024-04-25 PROCEDURE — 63600175 PHARM REV CODE 636 W HCPCS: Mod: JZ,JG | Performed by: SURGERY

## 2024-04-25 PROCEDURE — 25000003 PHARM REV CODE 250: Performed by: ANESTHESIOLOGY

## 2024-04-25 PROCEDURE — 71000039 HC RECOVERY, EACH ADD'L HOUR: Performed by: SURGERY

## 2024-04-25 PROCEDURE — 36000709 HC OR TIME LEV III EA ADD 15 MIN: Performed by: SURGERY

## 2024-04-25 PROCEDURE — 37000008 HC ANESTHESIA 1ST 15 MINUTES: Performed by: SURGERY

## 2024-04-25 PROCEDURE — 25000003 PHARM REV CODE 250: Performed by: STUDENT IN AN ORGANIZED HEALTH CARE EDUCATION/TRAINING PROGRAM

## 2024-04-25 PROCEDURE — 36000708 HC OR TIME LEV III 1ST 15 MIN: Performed by: SURGERY

## 2024-04-25 PROCEDURE — 25000003 PHARM REV CODE 250: Performed by: SURGERY

## 2024-04-25 PROCEDURE — 37000009 HC ANESTHESIA EA ADD 15 MINS: Performed by: SURGERY

## 2024-04-25 PROCEDURE — 94799 UNLISTED PULMONARY SVC/PX: CPT

## 2024-04-25 PROCEDURE — 63600175 PHARM REV CODE 636 W HCPCS: Performed by: SURGERY

## 2024-04-25 PROCEDURE — 63600175 PHARM REV CODE 636 W HCPCS: Performed by: STUDENT IN AN ORGANIZED HEALTH CARE EDUCATION/TRAINING PROGRAM

## 2024-04-25 PROCEDURE — 71000033 HC RECOVERY, INTIAL HOUR: Performed by: SURGERY

## 2024-04-25 PROCEDURE — 81025 URINE PREGNANCY TEST: CPT | Performed by: ANESTHESIOLOGY

## 2024-04-25 PROCEDURE — 27201423 OPTIME MED/SURG SUP & DEVICES STERILE SUPPLY: Performed by: SURGERY

## 2024-04-25 PROCEDURE — 63600175 PHARM REV CODE 636 W HCPCS: Performed by: ANESTHESIOLOGY

## 2024-04-25 RX ORDER — LIDOCAINE HYDROCHLORIDE 10 MG/ML
1 INJECTION, SOLUTION EPIDURAL; INFILTRATION; INTRACAUDAL; PERINEURAL ONCE
Status: ACTIVE | OUTPATIENT
Start: 2024-04-25

## 2024-04-25 RX ORDER — FENTANYL CITRATE 50 UG/ML
INJECTION, SOLUTION INTRAMUSCULAR; INTRAVENOUS
Status: DISCONTINUED | OUTPATIENT
Start: 2024-04-25 | End: 2024-04-25

## 2024-04-25 RX ORDER — ONDANSETRON HYDROCHLORIDE 2 MG/ML
4 INJECTION, SOLUTION INTRAVENOUS DAILY PRN
Status: ACTIVE | OUTPATIENT
Start: 2024-04-25

## 2024-04-25 RX ORDER — DEXAMETHASONE SODIUM PHOSPHATE 4 MG/ML
INJECTION, SOLUTION INTRA-ARTICULAR; INTRALESIONAL; INTRAMUSCULAR; INTRAVENOUS; SOFT TISSUE
Status: DISCONTINUED | OUTPATIENT
Start: 2024-04-25 | End: 2024-04-25

## 2024-04-25 RX ORDER — HYDROCODONE BITARTRATE AND ACETAMINOPHEN 5; 325 MG/1; MG/1
1 TABLET ORAL EVERY 6 HOURS PRN
Qty: 20 TABLET | Refills: 0 | Status: SHIPPED | OUTPATIENT
Start: 2024-04-25

## 2024-04-25 RX ORDER — ONDANSETRON HYDROCHLORIDE 2 MG/ML
INJECTION, SOLUTION INTRAMUSCULAR; INTRAVENOUS
Status: DISCONTINUED | OUTPATIENT
Start: 2024-04-25 | End: 2024-04-25

## 2024-04-25 RX ORDER — KETOROLAC TROMETHAMINE 30 MG/ML
INJECTION, SOLUTION INTRAMUSCULAR; INTRAVENOUS
Status: DISCONTINUED | OUTPATIENT
Start: 2024-04-25 | End: 2024-04-25

## 2024-04-25 RX ORDER — BUPIVACAINE HYDROCHLORIDE 2.5 MG/ML
INJECTION, SOLUTION EPIDURAL; INFILTRATION; INTRACAUDAL
Status: DISCONTINUED | OUTPATIENT
Start: 2024-04-25 | End: 2024-04-25 | Stop reason: HOSPADM

## 2024-04-25 RX ORDER — SODIUM CHLORIDE 9 MG/ML
INJECTION, SOLUTION INTRAVENOUS CONTINUOUS
Status: CANCELLED | OUTPATIENT
Start: 2024-04-25

## 2024-04-25 RX ORDER — SCOLOPAMINE TRANSDERMAL SYSTEM 1 MG/1
1 PATCH, EXTENDED RELEASE TRANSDERMAL
Status: DISCONTINUED | OUTPATIENT
Start: 2024-04-25 | End: 2024-04-25 | Stop reason: HOSPADM

## 2024-04-25 RX ORDER — SODIUM CHLORIDE 9 MG/ML
INJECTION, SOLUTION INTRAVENOUS CONTINUOUS
Status: DISCONTINUED | OUTPATIENT
Start: 2024-04-25 | End: 2024-04-25 | Stop reason: HOSPADM

## 2024-04-25 RX ORDER — PROMETHAZINE HYDROCHLORIDE 25 MG/ML
INJECTION, SOLUTION INTRAMUSCULAR; INTRAVENOUS
Status: DISCONTINUED | OUTPATIENT
Start: 2024-04-25 | End: 2024-04-25

## 2024-04-25 RX ORDER — SODIUM CHLORIDE 0.9 % (FLUSH) 0.9 %
3 SYRINGE (ML) INJECTION
Status: ACTIVE | OUTPATIENT
Start: 2024-04-25

## 2024-04-25 RX ORDER — ROCURONIUM BROMIDE 10 MG/ML
INJECTION, SOLUTION INTRAVENOUS
Status: DISCONTINUED | OUTPATIENT
Start: 2024-04-25 | End: 2024-04-25

## 2024-04-25 RX ORDER — ACETAMINOPHEN 10 MG/ML
INJECTION, SOLUTION INTRAVENOUS
Status: DISCONTINUED | OUTPATIENT
Start: 2024-04-25 | End: 2024-04-25

## 2024-04-25 RX ORDER — FENTANYL CITRATE 50 UG/ML
25 INJECTION, SOLUTION INTRAMUSCULAR; INTRAVENOUS EVERY 5 MIN PRN
Status: COMPLETED | OUTPATIENT
Start: 2024-04-25 | End: 2024-04-25

## 2024-04-25 RX ORDER — BUPROPION HYDROCHLORIDE 150 MG/1
150 TABLET ORAL DAILY
COMMUNITY

## 2024-04-25 RX ORDER — PHENYLEPHRINE HYDROCHLORIDE 10 MG/ML
INJECTION INTRAVENOUS
Status: DISCONTINUED | OUTPATIENT
Start: 2024-04-25 | End: 2024-04-25

## 2024-04-25 RX ORDER — LIDOCAINE HYDROCHLORIDE 20 MG/ML
INJECTION INTRAVENOUS
Status: DISCONTINUED | OUTPATIENT
Start: 2024-04-25 | End: 2024-04-25

## 2024-04-25 RX ORDER — MIDAZOLAM HYDROCHLORIDE 1 MG/ML
INJECTION INTRAMUSCULAR; INTRAVENOUS
Status: DISCONTINUED | OUTPATIENT
Start: 2024-04-25 | End: 2024-04-25

## 2024-04-25 RX ORDER — OXYCODONE HYDROCHLORIDE 5 MG/1
5 TABLET ORAL
Status: ACTIVE | OUTPATIENT
Start: 2024-04-25

## 2024-04-25 RX ORDER — PROPOFOL 10 MG/ML
VIAL (ML) INTRAVENOUS
Status: DISCONTINUED | OUTPATIENT
Start: 2024-04-25 | End: 2024-04-25

## 2024-04-25 RX ADMIN — FENTANYL CITRATE 100 MCG: 50 INJECTION, SOLUTION INTRAMUSCULAR; INTRAVENOUS at 11:04

## 2024-04-25 RX ADMIN — SODIUM CHLORIDE, SODIUM GLUCONATE, SODIUM ACETATE, POTASSIUM CHLORIDE AND MAGNESIUM CHLORIDE: 526; 502; 368; 37; 30 INJECTION, SOLUTION INTRAVENOUS at 10:04

## 2024-04-25 RX ADMIN — ACETAMINOPHEN 1000 MG: 10 INJECTION, SOLUTION INTRAVENOUS at 11:04

## 2024-04-25 RX ADMIN — FENTANYL CITRATE 25 MCG: 50 INJECTION, SOLUTION INTRAMUSCULAR; INTRAVENOUS at 01:04

## 2024-04-25 RX ADMIN — DEXAMETHASONE SODIUM PHOSPHATE 8 MG: 4 INJECTION, SOLUTION INTRA-ARTICULAR; INTRALESIONAL; INTRAMUSCULAR; INTRAVENOUS; SOFT TISSUE at 11:04

## 2024-04-25 RX ADMIN — SCOPALAMINE 1 PATCH: 1 PATCH, EXTENDED RELEASE TRANSDERMAL at 10:04

## 2024-04-25 RX ADMIN — CEFAZOLIN 2 G: 2 INJECTION, POWDER, FOR SOLUTION INTRAMUSCULAR; INTRAVENOUS at 11:04

## 2024-04-25 RX ADMIN — LIDOCAINE HYDROCHLORIDE 100 MG: 20 INJECTION, SOLUTION INTRAVENOUS at 10:04

## 2024-04-25 RX ADMIN — ONDANSETRON 4 MG: 2 INJECTION INTRAMUSCULAR; INTRAVENOUS at 10:04

## 2024-04-25 RX ADMIN — PHENYLEPHRINE HYDROCHLORIDE 100 MCG: 10 INJECTION INTRAVENOUS at 11:04

## 2024-04-25 RX ADMIN — MIDAZOLAM HYDROCHLORIDE 2 MG: 1 INJECTION, SOLUTION INTRAMUSCULAR; INTRAVENOUS at 10:04

## 2024-04-25 RX ADMIN — SODIUM CHLORIDE, SODIUM GLUCONATE, SODIUM ACETATE, POTASSIUM CHLORIDE AND MAGNESIUM CHLORIDE: 526; 502; 368; 37; 30 INJECTION, SOLUTION INTRAVENOUS at 11:04

## 2024-04-25 RX ADMIN — KETOROLAC TROMETHAMINE 30 MG: 30 INJECTION, SOLUTION INTRAMUSCULAR; INTRAVENOUS at 11:04

## 2024-04-25 RX ADMIN — FENTANYL CITRATE 100 MCG: 50 INJECTION, SOLUTION INTRAMUSCULAR; INTRAVENOUS at 10:04

## 2024-04-25 RX ADMIN — SUGAMMADEX 200 MG: 100 INJECTION, SOLUTION INTRAVENOUS at 11:04

## 2024-04-25 RX ADMIN — GLYCOPYRROLATE 0.1 MG: 0.2 INJECTION, SOLUTION INTRAMUSCULAR; INTRAVITREAL at 11:04

## 2024-04-25 RX ADMIN — PROPOFOL 200 MG: 10 INJECTION, EMULSION INTRAVENOUS at 10:04

## 2024-04-25 RX ADMIN — FENTANYL CITRATE 25 MCG: 50 INJECTION, SOLUTION INTRAMUSCULAR; INTRAVENOUS at 12:04

## 2024-04-25 RX ADMIN — PROMETHAZINE HYDROCHLORIDE 12.5 MG: 25 INJECTION INTRAMUSCULAR; INTRAVENOUS at 11:04

## 2024-04-25 RX ADMIN — ROCURONIUM BROMIDE 50 MG: 10 INJECTION, SOLUTION INTRAVENOUS at 10:04

## 2024-04-25 NOTE — PLAN OF CARE
Pre-op complete. Wife at bedside. Text notifications initiated. Pt denies need for safe. Belongings with wife.

## 2024-04-25 NOTE — PLAN OF CARE
Meets criteria for discharge. Discharged to home with wife. Denies nausea. Tolerating fluids. Tolerable pain. Steady gait. Voiding without difficulty. Discharge instructions reviewed and printed handout given. Verbalized understanding.

## 2024-04-25 NOTE — H&P
Subjective:         Subjective  Patient ID: Kandi Gonzáles is a 28 y.o. female.     Chief Complaint: Gall Bladder Problem        HPI   28-year-old female presents with a six-month history of right upper quadrant pain which radiates into her back.  It can be severe at times.  It is associated with food especially fatty food.  She denies any history of fever or chills.  She has never been jaundiced.  Initially a CT scan showed a dilated gallbladder and then an ultrasound revealed cholelithiasis without evidence of acute cholecystitis.  Her laboratory studies late last year were within normal limits.  She is interested in considering a laparoscopic cholecystectomy.  Her symptoms have continued.  She is not having pain currently.          Past Medical History:   Diagnosis Date    GERD (gastroesophageal reflux disease)              Past Surgical History:   Procedure Laterality Date    APPENDECTOMY         [id number]           Current Medications      Current Outpatient Medications:     buPROPion (WELLBUTRIN SR) 150 MG TBSR 12 hr tablet, Take 150 mg by mouth 2 (two) times daily., Disp: , Rfl:     omeprazole (PRILOSEC) 40 MG capsule, Take 1 capsule (40 mg total) by mouth Daily., Disp: 90 capsule, Rfl: 1             Review of patient's allergies indicates:   Allergen Reactions    Covid-19 vaccine, [id number], lnp-s (moderna) Rash               Family History   Problem Relation Age of Onset    Colon cancer Neg Hx      Esophageal cancer Neg Hx      Liver cancer Neg Hx        Social History            Socioeconomic History    Marital status:    Tobacco Use    Smoking status: Former       Types: Vaping with nicotine       Passive exposure: Never    Smokeless tobacco: Never         Review of Systems   HENT: Negative.     Respiratory: Negative.     Cardiovascular: Negative.    Gastrointestinal:  Positive for abdominal pain, nausea and vomiting.      Objective:      Physical Exam  Constitutional:       General: She is not in  acute distress.     Appearance: She is well-developed.   HENT:      Head: Normocephalic and atraumatic.   Eyes:      General: No scleral icterus.     Conjunctiva/sclera: Conjunctivae normal.      Pupils: Pupils are equal, round, and reactive to light.   Neck:      Thyroid: No thyromegaly.   Cardiovascular:      Rate and Rhythm: Normal rate and regular rhythm.      Heart sounds: No murmur heard.  Pulmonary:      Effort: Pulmonary effort is normal.      Breath sounds: Normal breath sounds. No wheezing or rales.   Abdominal:      General: Bowel sounds are normal. There is no distension.      Palpations: Abdomen is soft. There is no mass.      Tenderness: There is abdominal tenderness (Very mild right upper quadrant tenderness.). There is no guarding or rebound.      Hernia: No hernia is present.   Musculoskeletal:         General: Normal range of motion.      Cervical back: Normal range of motion.   Lymphadenopathy:      Cervical: No cervical adenopathy.   Skin:     General: Skin is warm and dry.      Findings: No erythema or rash.   Neurological:      Mental Status: She is alert and oriented to person, place, and time.   Psychiatric:         Behavior: Behavior normal.         Ultrasound reveals cholelithiasis without evidence of acute cholecystitis.  CT scan images were reviewed.  Liver associated enzymes, alk-phos, and total bilirubin are within normal limits.  Lipase was normal.     Assessment:           Encounter Diagnosis   Name Primary?    Gallstones           Plan:      Symptomatic cholelithiasis.  Plan laparoscopic, possible open cholecystectomy.  Risks and benefits of the planned procedure were discussed at length with the patient.  Risks and benefits of not proceeding with the procedure were discussed as well. All questions were answered. The patient expressed clear understanding and would like to proceed with the procedure as discussed.

## 2024-04-25 NOTE — TRANSFER OF CARE
"Anesthesia Transfer of Care Note    Patient: Kandi Gonzáles    Procedure(s) Performed: Procedure(s) (LRB):  CHOLECYSTECTOMY, LAPAROSCOPIC (N/A)    Patient location: PACU    Anesthesia Type: general    Transport from OR: Transported from OR on 2-3 L/min O2 by NC with adequate spontaneous ventilation    Post pain: adequate analgesia    Post assessment: no apparent anesthetic complications    Post vital signs: stable    Level of consciousness: responds to stimulation and lethargic    Nausea/Vomiting: no nausea/vomiting    Complications: none    Transfer of care protocol was followed      Last vitals: Visit Vitals  /67 (BP Location: Left arm, Patient Position: Lying)   Pulse 72   Temp 36.7 °C (98.1 °F) (Skin)   Resp 18   Ht 5' 5" (1.651 m)   Wt 85.3 kg (188 lb)   LMP 03/28/2024   SpO2 98%   Breastfeeding No   BMI 31.28 kg/m²     "

## 2024-04-25 NOTE — ANESTHESIA PREPROCEDURE EVALUATION
04/25/2024  Kandi Gonzáles is a 28 y.o., female.      Pre-op Assessment    I have reviewed the Patient Summary Reports.     I have reviewed the Nursing Notes. I have reviewed the NPO Status.   I have reviewed the Medications.     Review of Systems  Anesthesia Hx:  No problems with previous Anesthesia                Social:  Former Smoker       Cardiovascular:  Cardiovascular Normal                                            Pulmonary:  Pulmonary Normal                       Renal/:  Renal/ Normal                 Hepatic/GI:     GERD, well controlled             Neurological:  Neurology Normal                                      Endocrine:  Endocrine Normal          Obesity / BMI > 30      Physical Exam  General: Well nourished, Cooperative, Alert and Oriented    Airway:  Mallampati: II   Mouth Opening: Normal  TM Distance: Normal  Neck ROM: Normal ROM    Anesthesia Plan  Type of Anesthesia, risks & benefits discussed:    Anesthesia Type: Gen ETT, Gen Supraglottic Airway, Gen Natural Airway, MAC  Intra-op Monitoring Plan: Standard ASA Monitors  Post Op Pain Control Plan: multimodal analgesia  Induction:  IV  Airway Plan: Direct, Video and Fiberoptic, Post-Induction  Informed Consent: Informed consent signed with the Patient and all parties understand the risks and agree with anesthesia plan.  All questions answered.   ASA Score: 2    Ready For Surgery From Anesthesia Perspective.   .

## 2024-04-25 NOTE — PLAN OF CARE
VSS, pain tolerable after pain med given, lap site dressings to abdomen cdi, tolerated clear liquids without N/V, due to void. Pt transferred to phase II. Report given to CANDE Santillan.

## 2024-04-25 NOTE — OP NOTE
PATIENT NAME:  Kandi Gonzáles     DATE OF PROCEDURE: 4/25/2024    PROCEDURE: Laparoscopic Cholecystectomy    PREOPERATIVE DIAGNOSIS: Cholelithiasis / Cholecystitis   POSTOPERATIVE DIAGNOSIS: Cholelithiasis / Cholecystitis     SURGEON: Nathen Shrestha Jr., M.D.  ASSISTANT: Yary Rodriguez     DESCRIPTION OF PROCEDURE: After appropriate consent was obtained, consent forms   signed and questions answered, the patient was taken to the Operating Room and   placed on the operating room table where general endotracheal anesthesia was   induced without difficulty. Preoperative antibiotics were administered. SCDs were in   place. A Timeout procedure was performed. The abdomen was prepped and draped in the usual sterile fashion. A midline incision was made beneath the umbilicus. Dissection was performed down to the fascia, which was incised. Stay sutures were placed on the fascia and the Kulwinder trocar was inserted. The abdomen was insufflated. Under direct   vision and after injection with local anesthetic, a 5 mm trocar was placed in   the upper midline. A second 5 mm trocar was placed between these two. The   gallbladder was identified, grasped, and retracted. There was some mild to moderate  inflammation. The cystic duct was identified and carefully dissected. Three clips   were placed on the common duct side, 2 on the gallbladder side, and the duct was   transected. The artery was identified, dissected, clipped and cut as well. The   gallbladder was then dissected free of the liver bed with electrocautery. It was   placed in a retrieval bag and removed through the umbilical port site. No bile   was spilled. The gallbladder fossa was examined and found to be hemostatic. The trocars were then removed under direct vision.The abdomen was desufflated. The fascia at the umbilicus was closed with interrupted 0 Vicryl suture and additional local was injected. The skin was then closed with 4-0 Monocryl subcuticular stitches.  Steri-Strips were then applied to all incisions. The patient was awakened, extubated and transferred to the Recovery Room in good condition. The patient tolerated the procedure without complication. Lap and instrument counts were reported as correct at the termination of the procedure.    EBL: 10 cc  SPECIMEN: gallbladder  COMPLICATIONS: none  ANESTHESIA: General

## 2024-04-25 NOTE — ANESTHESIA PROCEDURE NOTES
Intubation    Date/Time: 4/25/2024 10:59 AM    Performed by: Megan Billingsley CRNA  Authorized by: Reno Munson MD    Intubation:     Induction:  Intravenous    Intubated:  Postinduction    Mask Ventilation:  Easy mask    Attempts:  1    Attempted By:  CRNA    Method of Intubation:  Video laryngoscopy    Blade:  Gooden 3    Laryngeal View Grade: Grade IIA - cords partially seen      Difficult Airway Encountered?: No      Complications:  None    Airway Device:  Oral endotracheal tube    Airway Device Size:  7.0    Style/Cuff Inflation:  Cuffed (inflated to minimal occlusive pressure)    Tube secured:  21    Secured at:  The lips    Placement Verified By:  Capnometry    Complicating Factors:  None    Findings Post-Intubation:  BS equal bilateral and atraumatic/condition of teeth unchanged

## 2024-04-25 NOTE — ANESTHESIA POSTPROCEDURE EVALUATION
Anesthesia Post Evaluation    Patient: Kandi Gonzáles    Procedure(s) Performed: Procedure(s) (LRB):  CHOLECYSTECTOMY, LAPAROSCOPIC (N/A)    Final Anesthesia Type: general      Patient location during evaluation: PACU  Patient participation: Yes- Able to Participate  Level of consciousness: awake and alert and oriented  Post-procedure vital signs: reviewed and stable  Pain management: adequate  Airway patency: patent    PONV status at discharge: No PONV  Anesthetic complications: no      Cardiovascular status: blood pressure returned to baseline and stable  Respiratory status: unassisted and spontaneous ventilation  Hydration status: euvolemic  Follow-up not needed.              Vitals Value Taken Time   /69 04/25/24 1330   Temp 36.7 °C (98 °F) 04/25/24 1330   Pulse 87 04/25/24 1330   Resp 16 04/25/24 1330   SpO2 97 % 04/25/24 1330         Event Time   Out of Recovery 13:30:00         Pain/Pradip Score: Pain Rating Prior to Med Admin: 6 (4/25/2024  1:10 PM)  Pain Rating Post Med Admin: 4 (4/25/2024  1:25 PM)  Pradip Score: 10 (4/25/2024  1:25 PM)

## 2024-04-25 NOTE — DISCHARGE INSTRUCTIONS
"Discharge Instructions: After Your Surgery/Procedure  Youve just had surgery. During surgery you were given medicine called anesthesia to keep you relaxed and free of pain. After surgery you may have some pain or nausea. This is common. Here are some tips for feeling better and getting well after surgery.     Stay on schedule with your medication.   Going home  Your doctor or nurse will show you how to take care of yourself when you go home. He or she will also answer your questions. Have an adult family member or friend drive you home.      For your safety we recommend these precaution for the first 24 hours after your procedure:  Do not drive or use heavy equipment.  Do not make important decisions or sign legal papers.  Do not drink alcohol.  Have someone stay with you, if needed. He or she can watch for problems and help keep you safe.  Your concentration, balance, coordination, and judgement may be impaired for many hours after anesthesia.  Use caution when ambulating or standing up.     You may feel weak and "washed out" after anesthesia and surgery.      Subtle residual effects of general anesthesia or sedation with regional / local anesthesia can last more than 24 hours.  Rest for the remainder of the day or longer if your Doctor/Surgeon has advised you to do so.  Although you may feel normal within the first 24 hours, your reflexes and mental ability may be impaired without you realizing it.  You may feel dizzy, lightheaded or sleepy for 24 hours or longer.      Be sure to go to all follow-up visits with your doctor. And rest after your surgery for as long as your doctor tells you to.  Coping with pain  If you have pain after surgery, pain medicine will help you feel better. Take it as told, before pain becomes severe. Also, ask your doctor or pharmacist about other ways to control pain. This might be with heat, ice, or relaxation. And follow any other instructions your surgeon or nurse gives you.  Tips " for taking pain medicine  To get the best relief possible, remember these points:  Pain medicines can upset your stomach. Taking them with a little food may help.  Most pain relievers taken by mouth need at least 20 to 30 minutes to start to work.  Taking medicine on a schedule can help you remember to take it. Try to time your medicine so that you can take it before starting an activity. This might be before you get dressed, go for a walk, or sit down for dinner.  Constipation is a common side effect of pain medicines. Call your doctor before taking any medicines such as laxatives or stool softeners to help ease constipation. Also ask if you should skip any foods. Drinking lots of fluids and eating foods such as fruits and vegetables that are high in fiber can also help. Remember, do not take laxatives unless your surgeon has prescribed them.  Drinking alcohol and taking pain medicine can cause dizziness and slow your breathing. It can even be deadly. Do not drink alcohol while taking pain medicine.  Pain medicine can make you react more slowly to things. Do not drive or run machinery while taking pain medicine.  Your health care provider may tell you to take acetaminophen to help ease your pain. Ask him or her how much you are supposed to take each day. Acetaminophen or other pain relievers may interact with your prescription medicines or other over-the-counter (OTC) drugs. Some prescription medicines have acetaminophen and other ingredients. Using both prescription and OTC acetaminophen for pain can cause you to overdose. Read the labels on your OTC medicines with care. This will help you to clearly know the list of ingredients, how much to take, and any warnings. It may also help you not take too much acetaminophen. If you have questions or do not understand the information, ask your pharmacist or health care provider to explain it to you before you take the OTC medicine.  Managing nausea  Some people have an  upset stomach after surgery. This is often because of anesthesia, pain, or pain medicine, or the stress of surgery. These tips will help you handle nausea and eat healthy foods as you get better. If you were on a special food plan before surgery, ask your doctor if you should follow it while you get better. These tips may help:  Do not push yourself to eat. Your body will tell you when to eat and how much.  Start off with clear liquids and soup. They are easier to digest.  Next try semi-solid foods, such as mashed potatoes, applesauce, and gelatin, as you feel ready.  Slowly move to solid foods. Dont eat fatty, rich, or spicy foods at first.  Do not force yourself to have 3 large meals a day. Instead eat smaller amounts more often.  Take pain medicines with a small amount of solid food, such as crackers or toast, to avoid nausea.     Call your surgeon if  You still have pain an hour after taking medicine. The medicine may not be strong enough.  You feel too sleepy, dizzy, or groggy. The medicine may be too strong.  You have side effects like nausea, vomiting, or skin changes, such as rash, itching, or hives.       If you have obstructive sleep apnea  You were given anesthesia medicine during surgery to keep you comfortable and free of pain. After surgery, you may have more apnea spells because of this medicine and other medicines you were given. The spells may last longer than usual.   At home:  Keep using the continuous positive airway pressure (CPAP) device when you sleep. Unless your health care provider tells you not to, use it when you sleep, day or night. CPAP is a common device used to treat obstructive sleep apnea.  Talk with your provider before taking any pain medicine, muscle relaxants, or sedatives. Your provider will tell you about the possible dangers of taking these medicines.  © 8227-1111 [employer]. [address] [place] [address], [place] [address]. All rights reserved. This information is  not intended as a substitute for professional medical care. Always follow your healthcare professional's instructions.  Post op instructions for prevention of DVT  What is deep vein thrombosis?  Deep vein thrombosis (DVT) is the medical term for blood clots in the deep veins of the leg.  These blood clots can be dangerous.  A DVT can block a blood vessel and keep blood from getting where it needs to go.  Another problem is that the clot can travel to other parts of the body such as the lungs.  A clot that travels to the lungs is called a pulmonary embolus (PE) and can cause serious problems with breathing which can lead to death.  Am I at risk for DVT/PE?  If you are not very active, you are at risk of DVT.  Anyone confined to bed, sitting for long periods of time, recovering from surgery, etc. increases the risk of DVT.  Other risk factors are cancer diagnosis, certain medications, estrogen replacement in any form,older age, obesity, pregnancy, smoking, history of clotting disorders, and dehydration.  How will I know if I have a DVT?  Swelling in the lower leg  Pain  Warmth, redness, hardness or bulging of the vein  If you have any of these symptoms, call your doctors office right away.  Some people will not have any symptoms until the clot moves to the lungs.  What are the symptoms of a PE?  Panting, shortness of breath, or trouble breathing  Sharp, knife-like chest pain when you breathe  Coughing or coughing up blood  Rapid heartbeat  If you have any of these symptoms or get worse quickly, call 911 for emergency treatment.  How can I prevent a DVT?  Avoid long periods of inactivity and dont cross your legs--get up and walk around every hour or so.  Stay active--walking after surgery is highly encouraged.  This means you should get out of the house and walk in the neighborhood.  Going up and down stairs will not impair healing (unless advised against such activity by your doctor).    Drink plenty of  noncaffeinated, nonalcoholic fluids each day to prevent dehydration.  Wear special support stockings, if they have been advised by your doctor.  If you travel, stop at least once an hour and walk around.  Avoid smoking (assistance with stopping is available through your healthcare provider)  Always notify your doctor if you are not able to follow the post operative instructions that are given to you at the time of discharge.  It may be necessary to prescribe one of the medications available to prevent DVT.

## 2024-04-25 NOTE — DISCHARGE SUMMARY
Discharge Summary  General Surgery      Admit Date: 4/25/2024    Discharge Date :4/25/2024    Attending Physician: Nathen Truong     Discharge Physician: Nathen Truong    Discharged Condition: good    Discharge Diagnosis: Gallstones [K80.20]    Treatments/Procedures: Procedure(s) (LRB):  CHOLECYSTECTOMY, LAPAROSCOPIC (N/A)    Hospital Course: Uneventful; Discharged home from Recovery    Significant Diagnostic Studies: none    Disposition: Home or Self Care    Diet: Regular    Follow up: Office 10-14 days    Activity: No heavy lifting till seen in office.    Patient Instructions:   Current Discharge Medication List        START taking these medications    Details   HYDROcodone-acetaminophen (NORCO) 5-325 mg per tablet Take 1 tablet by mouth every 6 (six) hours as needed for Pain.  Qty: 20 tablet, Refills: 0    Comments: Quantity prescribed more than 7 day supply? No           CONTINUE these medications which have NOT CHANGED    Details   omeprazole (PRILOSEC) 40 MG capsule Take 1 capsule (40 mg total) by mouth Daily.  Qty: 90 capsule, Refills: 1    Associated Diagnoses: Gastroesophageal reflux disease, unspecified whether esophagitis present      buPROPion (WELLBUTRIN XL) 150 MG TB24 tablet Take 150 mg by mouth once daily.             Discharge Procedure Orders   Diet general

## 2024-04-26 VITALS
SYSTOLIC BLOOD PRESSURE: 118 MMHG | OXYGEN SATURATION: 97 % | BODY MASS INDEX: 31.32 KG/M2 | HEART RATE: 87 BPM | TEMPERATURE: 98 F | WEIGHT: 188 LBS | DIASTOLIC BLOOD PRESSURE: 69 MMHG | RESPIRATION RATE: 16 BRPM | HEIGHT: 65 IN

## 2024-04-27 ENCOUNTER — HOSPITAL ENCOUNTER (OUTPATIENT)
Facility: HOSPITAL | Age: 29
Discharge: HOME | End: 2024-04-28
Attending: EMERGENCY MEDICINE | Admitting: STUDENT IN AN ORGANIZED HEALTH CARE EDUCATION/TRAINING PROGRAM
Payer: COMMERCIAL

## 2024-04-27 DIAGNOSIS — I95.9 HYPOTENSION, UNSPECIFIED HYPOTENSION TYPE: ICD-10-CM

## 2024-04-27 DIAGNOSIS — G89.18 POST-OPERATIVE PAIN: ICD-10-CM

## 2024-04-27 DIAGNOSIS — R55 SYNCOPE: Primary | ICD-10-CM

## 2024-04-27 DIAGNOSIS — R00.1 BRADYCARDIA: ICD-10-CM

## 2024-04-27 DIAGNOSIS — R07.9 CHEST PAIN: ICD-10-CM

## 2024-04-27 PROBLEM — Z90.49 S/P CHOLECYSTECTOMY: Status: ACTIVE | Noted: 2024-04-27

## 2024-04-27 LAB
ALBUMIN SERPL BCP-MCNC: 4 G/DL (ref 3.5–5.2)
ALP SERPL-CCNC: 34 U/L (ref 55–135)
ALT SERPL W/O P-5'-P-CCNC: 22 U/L (ref 10–44)
ANION GAP SERPL CALC-SCNC: 6 MMOL/L (ref 8–16)
AST SERPL-CCNC: 19 U/L (ref 10–40)
B-HCG UR QL: NEGATIVE
BACTERIA #/AREA URNS HPF: ABNORMAL /HPF
BASOPHILS # BLD AUTO: 0.03 K/UL (ref 0–0.2)
BASOPHILS NFR BLD: 0.4 % (ref 0–1.9)
BILIRUB SERPL-MCNC: 0.5 MG/DL (ref 0.1–1)
BILIRUB UR QL STRIP: NEGATIVE
BUN SERPL-MCNC: 17 MG/DL (ref 6–20)
CALCIUM SERPL-MCNC: 8.6 MG/DL (ref 8.7–10.5)
CHLORIDE SERPL-SCNC: 109 MMOL/L (ref 95–110)
CLARITY UR: CLEAR
CO2 SERPL-SCNC: 25 MMOL/L (ref 23–29)
COLOR UR: YELLOW
CREAT SERPL-MCNC: 0.9 MG/DL (ref 0.5–1.4)
CREAT SERPL-MCNC: 0.9 MG/DL (ref 0.5–1.4)
CTP QC/QA: YES
DIFFERENTIAL METHOD BLD: NORMAL
EOSINOPHIL # BLD AUTO: 0.1 K/UL (ref 0–0.5)
EOSINOPHIL NFR BLD: 1 % (ref 0–8)
ERYTHROCYTE [DISTWIDTH] IN BLOOD BY AUTOMATED COUNT: 12.2 % (ref 11.5–14.5)
EST. GFR  (NO RACE VARIABLE): >60 ML/MIN/1.73 M^2
GLUCOSE SERPL-MCNC: 120 MG/DL (ref 70–110)
GLUCOSE UR QL STRIP: NEGATIVE
HCT VFR BLD AUTO: 39.8 % (ref 37–48.5)
HGB BLD-MCNC: 13.2 G/DL (ref 12–16)
HGB UR QL STRIP: ABNORMAL
HYALINE CASTS #/AREA URNS LPF: 3 /LPF
IMM GRANULOCYTES # BLD AUTO: 0.04 K/UL (ref 0–0.04)
IMM GRANULOCYTES NFR BLD AUTO: 0.5 % (ref 0–0.5)
KETONES UR QL STRIP: NEGATIVE
LEUKOCYTE ESTERASE UR QL STRIP: NEGATIVE
LYMPHOCYTES # BLD AUTO: 1.4 K/UL (ref 1–4.8)
LYMPHOCYTES NFR BLD: 18.8 % (ref 18–48)
MAGNESIUM SERPL-MCNC: 2 MG/DL (ref 1.6–2.6)
MCH RBC QN AUTO: 30.6 PG (ref 27–31)
MCHC RBC AUTO-ENTMCNC: 33.2 G/DL (ref 32–36)
MCV RBC AUTO: 92 FL (ref 82–98)
MICROSCOPIC COMMENT: ABNORMAL
MONOCYTES # BLD AUTO: 0.7 K/UL (ref 0.3–1)
MONOCYTES NFR BLD: 9.3 % (ref 4–15)
NEUTROPHILS # BLD AUTO: 5.2 K/UL (ref 1.8–7.7)
NEUTROPHILS NFR BLD: 70 % (ref 38–73)
NITRITE UR QL STRIP: NEGATIVE
NRBC BLD-RTO: 0 /100 WBC
PH UR STRIP: 6 [PH] (ref 5–8)
PLATELET # BLD AUTO: 200 K/UL (ref 150–450)
PMV BLD AUTO: 11 FL (ref 9.2–12.9)
POTASSIUM SERPL-SCNC: 3.9 MMOL/L (ref 3.5–5.1)
PROT SERPL-MCNC: 6.6 G/DL (ref 6–8.4)
PROT UR QL STRIP: ABNORMAL
RBC # BLD AUTO: 4.31 M/UL (ref 4–5.4)
RBC #/AREA URNS HPF: 5 /HPF (ref 0–4)
SAMPLE: NORMAL
SODIUM SERPL-SCNC: 140 MMOL/L (ref 136–145)
SP GR UR STRIP: >1.03 (ref 1–1.03)
SQUAMOUS #/AREA URNS HPF: 1 /HPF
TROPONIN I SERPL HS-MCNC: <2.3 PG/ML (ref 0–14.9)
TSH SERPL DL<=0.005 MIU/L-ACNC: 3.35 UIU/ML (ref 0.34–5.6)
URN SPEC COLLECT METH UR: ABNORMAL
UROBILINOGEN UR STRIP-ACNC: NEGATIVE EU/DL
WBC # BLD AUTO: 7.35 K/UL (ref 3.9–12.7)
WBC #/AREA URNS HPF: 2 /HPF (ref 0–5)

## 2024-04-27 PROCEDURE — 93005 ELECTROCARDIOGRAM TRACING: CPT | Performed by: GENERAL PRACTICE

## 2024-04-27 PROCEDURE — 81001 URINALYSIS AUTO W/SCOPE: CPT | Performed by: EMERGENCY MEDICINE

## 2024-04-27 PROCEDURE — G0378 HOSPITAL OBSERVATION PER HR: HCPCS

## 2024-04-27 PROCEDURE — 85025 COMPLETE CBC W/AUTO DIFF WBC: CPT | Performed by: EMERGENCY MEDICINE

## 2024-04-27 PROCEDURE — 84443 ASSAY THYROID STIM HORMONE: CPT | Performed by: STUDENT IN AN ORGANIZED HEALTH CARE EDUCATION/TRAINING PROGRAM

## 2024-04-27 PROCEDURE — 25500020 PHARM REV CODE 255: Performed by: EMERGENCY MEDICINE

## 2024-04-27 PROCEDURE — 63600175 PHARM REV CODE 636 W HCPCS: Performed by: EMERGENCY MEDICINE

## 2024-04-27 PROCEDURE — 25000003 PHARM REV CODE 250: Performed by: STUDENT IN AN ORGANIZED HEALTH CARE EDUCATION/TRAINING PROGRAM

## 2024-04-27 PROCEDURE — 96375 TX/PRO/DX INJ NEW DRUG ADDON: CPT

## 2024-04-27 PROCEDURE — 84484 ASSAY OF TROPONIN QUANT: CPT | Performed by: EMERGENCY MEDICINE

## 2024-04-27 PROCEDURE — 81025 URINE PREGNANCY TEST: CPT | Performed by: EMERGENCY MEDICINE

## 2024-04-27 PROCEDURE — 93010 ELECTROCARDIOGRAM REPORT: CPT | Mod: ,,, | Performed by: GENERAL PRACTICE

## 2024-04-27 PROCEDURE — 36415 COLL VENOUS BLD VENIPUNCTURE: CPT | Performed by: STUDENT IN AN ORGANIZED HEALTH CARE EDUCATION/TRAINING PROGRAM

## 2024-04-27 PROCEDURE — 83735 ASSAY OF MAGNESIUM: CPT | Performed by: EMERGENCY MEDICINE

## 2024-04-27 PROCEDURE — 96361 HYDRATE IV INFUSION ADD-ON: CPT

## 2024-04-27 PROCEDURE — 80053 COMPREHEN METABOLIC PANEL: CPT | Performed by: EMERGENCY MEDICINE

## 2024-04-27 PROCEDURE — 82565 ASSAY OF CREATININE: CPT

## 2024-04-27 PROCEDURE — 96374 THER/PROPH/DIAG INJ IV PUSH: CPT | Mod: 59

## 2024-04-27 PROCEDURE — 36415 COLL VENOUS BLD VENIPUNCTURE: CPT | Performed by: EMERGENCY MEDICINE

## 2024-04-27 PROCEDURE — 99285 EMERGENCY DEPT VISIT HI MDM: CPT | Mod: 25

## 2024-04-27 PROCEDURE — 25000003 PHARM REV CODE 250: Performed by: EMERGENCY MEDICINE

## 2024-04-27 RX ORDER — HYDROCODONE BITARTRATE AND ACETAMINOPHEN 10; 325 MG/1; MG/1
1 TABLET ORAL EVERY 6 HOURS PRN
Status: DISCONTINUED | OUTPATIENT
Start: 2024-04-27 | End: 2024-04-28 | Stop reason: HOSPADM

## 2024-04-27 RX ORDER — NALOXONE HCL 0.4 MG/ML
0.02 VIAL (ML) INJECTION
Status: DISCONTINUED | OUTPATIENT
Start: 2024-04-27 | End: 2024-04-28 | Stop reason: HOSPADM

## 2024-04-27 RX ORDER — IBUPROFEN 200 MG
24 TABLET ORAL
Status: DISCONTINUED | OUTPATIENT
Start: 2024-04-27 | End: 2024-04-28 | Stop reason: HOSPADM

## 2024-04-27 RX ORDER — SODIUM CHLORIDE 0.9 % (FLUSH) 0.9 %
10 SYRINGE (ML) INJECTION EVERY 12 HOURS PRN
Status: DISCONTINUED | OUTPATIENT
Start: 2024-04-27 | End: 2024-04-28 | Stop reason: HOSPADM

## 2024-04-27 RX ORDER — SODIUM CHLORIDE 9 MG/ML
INJECTION, SOLUTION INTRAVENOUS CONTINUOUS
Status: DISCONTINUED | OUTPATIENT
Start: 2024-04-27 | End: 2024-04-28 | Stop reason: HOSPADM

## 2024-04-27 RX ORDER — PANTOPRAZOLE SODIUM 40 MG/1
40 TABLET, DELAYED RELEASE ORAL DAILY
Status: DISCONTINUED | OUTPATIENT
Start: 2024-04-27 | End: 2024-04-28 | Stop reason: HOSPADM

## 2024-04-27 RX ORDER — SODIUM,POTASSIUM PHOSPHATES 280-250MG
2 POWDER IN PACKET (EA) ORAL
Status: DISCONTINUED | OUTPATIENT
Start: 2024-04-27 | End: 2024-04-28 | Stop reason: HOSPADM

## 2024-04-27 RX ORDER — LANOLIN ALCOHOL/MO/W.PET/CERES
800 CREAM (GRAM) TOPICAL
Status: DISCONTINUED | OUTPATIENT
Start: 2024-04-27 | End: 2024-04-28 | Stop reason: HOSPADM

## 2024-04-27 RX ORDER — ONDANSETRON 4 MG/1
8 TABLET, ORALLY DISINTEGRATING ORAL EVERY 12 HOURS PRN
COMMUNITY

## 2024-04-27 RX ORDER — SODIUM CHLORIDE 9 MG/ML
1000 INJECTION, SOLUTION INTRAVENOUS
Status: COMPLETED | OUTPATIENT
Start: 2024-04-27 | End: 2024-04-27

## 2024-04-27 RX ORDER — GLUCAGON 1 MG
1 KIT INJECTION
Status: DISCONTINUED | OUTPATIENT
Start: 2024-04-27 | End: 2024-04-28 | Stop reason: HOSPADM

## 2024-04-27 RX ORDER — IBUPROFEN 200 MG
16 TABLET ORAL
Status: DISCONTINUED | OUTPATIENT
Start: 2024-04-27 | End: 2024-04-28 | Stop reason: HOSPADM

## 2024-04-27 RX ORDER — ONDANSETRON 4 MG/1
8 TABLET, ORALLY DISINTEGRATING ORAL EVERY 8 HOURS PRN
Status: DISCONTINUED | OUTPATIENT
Start: 2024-04-27 | End: 2024-04-28 | Stop reason: HOSPADM

## 2024-04-27 RX ORDER — HYDROCODONE BITARTRATE AND ACETAMINOPHEN 5; 325 MG/1; MG/1
1 TABLET ORAL EVERY 6 HOURS PRN
Status: DISCONTINUED | OUTPATIENT
Start: 2024-04-27 | End: 2024-04-28 | Stop reason: HOSPADM

## 2024-04-27 RX ORDER — TALC
6 POWDER (GRAM) TOPICAL NIGHTLY PRN
Status: DISCONTINUED | OUTPATIENT
Start: 2024-04-27 | End: 2024-04-28 | Stop reason: HOSPADM

## 2024-04-27 RX ORDER — ACETAMINOPHEN 325 MG/1
650 TABLET ORAL EVERY 6 HOURS PRN
Status: DISCONTINUED | OUTPATIENT
Start: 2024-04-27 | End: 2024-04-28 | Stop reason: HOSPADM

## 2024-04-27 RX ORDER — KETOROLAC TROMETHAMINE 30 MG/ML
15 INJECTION, SOLUTION INTRAMUSCULAR; INTRAVENOUS
Status: COMPLETED | OUTPATIENT
Start: 2024-04-27 | End: 2024-04-27

## 2024-04-27 RX ORDER — ONDANSETRON HYDROCHLORIDE 2 MG/ML
4 INJECTION, SOLUTION INTRAVENOUS
Status: COMPLETED | OUTPATIENT
Start: 2024-04-27 | End: 2024-04-27

## 2024-04-27 RX ORDER — ALUMINUM HYDROXIDE, MAGNESIUM HYDROXIDE, AND SIMETHICONE 1200; 120; 1200 MG/30ML; MG/30ML; MG/30ML
30 SUSPENSION ORAL 4 TIMES DAILY PRN
Status: DISCONTINUED | OUTPATIENT
Start: 2024-04-27 | End: 2024-04-28 | Stop reason: HOSPADM

## 2024-04-27 RX ORDER — FENTANYL CITRATE 50 UG/ML
50 INJECTION, SOLUTION INTRAMUSCULAR; INTRAVENOUS
Status: COMPLETED | OUTPATIENT
Start: 2024-04-27 | End: 2024-04-27

## 2024-04-27 RX ADMIN — SODIUM CHLORIDE: 9 INJECTION, SOLUTION INTRAVENOUS at 11:04

## 2024-04-27 RX ADMIN — SODIUM CHLORIDE 1000 ML: 9 INJECTION, SOLUTION INTRAVENOUS at 05:04

## 2024-04-27 RX ADMIN — IOHEXOL 100 ML: 350 INJECTION, SOLUTION INTRAVENOUS at 06:04

## 2024-04-27 RX ADMIN — KETOROLAC TROMETHAMINE 15 MG: 30 INJECTION, SOLUTION INTRAMUSCULAR; INTRAVENOUS at 08:04

## 2024-04-27 RX ADMIN — PANTOPRAZOLE SODIUM 40 MG: 40 TABLET, DELAYED RELEASE ORAL at 10:04

## 2024-04-27 RX ADMIN — SODIUM CHLORIDE 1000 ML: 0.9 INJECTION, SOLUTION INTRAVENOUS at 08:04

## 2024-04-27 RX ADMIN — ACETAMINOPHEN 650 MG: 325 TABLET ORAL at 09:04

## 2024-04-27 RX ADMIN — FENTANYL CITRATE 50 MCG: 50 INJECTION, SOLUTION INTRAMUSCULAR; INTRAVENOUS at 05:04

## 2024-04-27 RX ADMIN — ACETAMINOPHEN 650 MG: 325 TABLET ORAL at 01:04

## 2024-04-27 RX ADMIN — ONDANSETRON 4 MG: 2 INJECTION INTRAMUSCULAR; INTRAVENOUS at 05:04

## 2024-04-27 NOTE — H&P
[facility] - Emergency Dept  Hospital Medicine  History & Physical    Patient Name: Kandi Gonzáles  MRN: [id number]  Patient Class: OP- Observation  Admission Date: 4/27/2024  Attending Physician: Berlin Dotson MD   Primary Care Provider: Lisette Primary Doctor         Patient information was obtained from patient, past medical records, and ER records.     Subjective:     Principal Problem:Syncope    Chief Complaint:   Chief Complaint   Patient presents with    Loss of Consciousness     Patient had gallbladder removed Thursday. States she woke up this am with severe lower abdominal pain, walked to bathroom, became dizzy and had blurry vision and passed out.         HPI: 28F with history of recent cholecystectomy presents due to episodes of syncope at home while using the toilet. Events associated with nausea and vomiting. Found to be bradycardic on arrival which normalized. Given IVF, pain meds, antiemetic. Reports has been taking norcos and ibuprofen at home. Labs overall unremarkable. CT abd/pelv has some small fluid collections which seem consistent with the postoperative state. Admitted to hospital medicine service for continued observation and syncope evaluation.    Past Medical History:   Diagnosis Date    GERD (gastroesophageal reflux disease)        Past Surgical History:   Procedure Laterality Date    APPENDECTOMY      [id number]    GANGLION CYST EXCISION      LAPAROSCOPIC CHOLECYSTECTOMY N/A 4/25/2024    Procedure: CHOLECYSTECTOMY, LAPAROSCOPIC;  Surgeon: Nathen Truong MD;  Location: [facility];  Service: General;  Laterality: N/A;    WISDOM TOOTH EXTRACTION         Review of patient's allergies indicates:   Allergen Reactions    Covid-19 vaccine, [id number], lnp-s (moderna) Rash       Current Facility-Administered Medications   Medication Dose Route Frequency Provider Last Rate Last Admin    0.9%  NaCl infusion   Intravenous Continuous Berlin Dotson MD 75 mL/hr at 04/27/24 1117 New Bag  at 04/27/24 1117    acetaminophen tablet 650 mg  650 mg Oral Q6H PRN Berlin Dotson MD   650 mg at 04/27/24 1336    aluminum-magnesium hydroxide-simethicone 200-200-20 mg/5 mL suspension 30 mL  30 mL Oral QID PRN Berlin Dotson MD        dextrose 50% injection 12.5 g  12.5 g Intravenous PRN Berlin Dotson MD        dextrose 50% injection 25 g  25 g Intravenous PRN Berlin Dotson MD        glucagon (human recombinant) injection 1 mg  1 mg Intramuscular PRN Berlin Dotson MD        glucose chewable tablet 16 g  16 g Oral PRN Berlin Dotson MD        glucose chewable tablet 24 g  24 g Oral PRN Berlin Dotson MD        HYDROcodone-acetaminophen  mg per tablet 1 tablet  1 tablet Oral Q6H PRN Berlin Dotson MD        HYDROcodone-acetaminophen 5-325 mg per tablet 1 tablet  1 tablet Oral Q6H PRN Berlin Dotson MD        magnesium oxide tablet 800 mg  800 mg Oral PRN Berlin Dotson MD        magnesium oxide tablet 800 mg  800 mg Oral PRN Berlin Dotson MD        melatonin tablet 6 mg  6 mg Oral Nightly PRN Berlin Dotson MD        naloxone 0.4 mg/mL injection 0.02 mg  0.02 mg Intravenous PRN Berlin Dotson MD        ondansetron disintegrating tablet 8 mg  8 mg Oral Q8H PRN Berlin Dotson MD        pantoprazole EC tablet 40 mg  40 mg Oral Daily Berlin Dotson MD   40 mg at 04/27/24 1016    potassium bicarbonate disintegrating tablet 35 mEq  35 mEq Oral PRN Berlin Dotson MD        potassium bicarbonate disintegrating tablet 50 mEq  50 mEq Oral PRN Berlin Dotson MD        potassium bicarbonate disintegrating tablet 60 mEq  60 mEq Oral PRN Berlin Dotson MD        potassium, sodium phosphates 280-160-250 mg packet 2 packet  2 packet Oral PRN Berlin Dotson MD        potassium, sodium phosphates 280-160-250 mg packet 2 packet  2 packet Oral PRN Berlin Dotson MD        potassium, sodium phosphates 280-160-250 mg packet 2 packet  2 packet Oral PRN Berlin Dotson MD        sodium chloride 0.9% flush 10  mL  10 mL Intravenous Q12H PRN Berlin Dotson MD         Current Outpatient Medications   Medication Sig Dispense Refill    HYDROcodone-acetaminophen (NORCO) 5-325 mg per tablet Take 1 tablet by mouth every 6 (six) hours as needed for Pain. 20 tablet 0    omeprazole (PRILOSEC) 40 MG capsule Take 1 capsule (40 mg total) by mouth Daily. 90 capsule 1    ondansetron (ZOFRAN-ODT) 4 MG TbDL Take 8 mg by mouth every 12 (twelve) hours as needed.      buPROPion (WELLBUTRIN XL) 150 MG TB24 tablet Take 150 mg by mouth once daily.       Facility-Administered Medications Ordered in Other Encounters   Medication Dose Route Frequency Provider Last Rate Last Admin    electrolyte-S (ISOLYTE)   Intravenous Continuous Patricio Garcia MD 25 mL/hr at 04/25/24 1200 Rate Change at 04/25/24 1200    LIDOcaine (PF) 10 mg/ml (1%) injection 10 mg  1 mL Intradermal Once Patricio Garcia MD        ondansetron injection 4 mg  4 mg Intravenous Daily PRN Patricio Garcia MD        oxyCODONE immediate release tablet 5 mg  5 mg Oral Q3H PRN Patricio Garcia MD        sodium chloride 0.9% flush 3 mL  3 mL Intravenous PRN Patricio Garcia MD         Family History    None       Tobacco Use    Smoking status: Former     Types: Vaping with nicotine     Passive exposure: Never    Smokeless tobacco: Never   Substance and Sexual Activity    Alcohol use: Not on file     Comment: NO    Drug use: Not on file     Comment: NO    Sexual activity: Not on file     Review of Systems   Constitutional:  Negative for chills and fever.   Respiratory:  Negative for shortness of breath.    Cardiovascular:  Negative for chest pain.   Gastrointestinal:  Positive for abdominal pain (post-op). Negative for constipation, diarrhea, nausea and vomiting.     Objective:     Vital Signs (Most Recent):  Temp: 97.9 °F (36.6 °C) (04/27/24 0530)  Pulse: 88 (04/27/24 1415)  Resp: 20 (04/27/24 1415)  BP: (!) 101/59 (04/27/24 1415)  SpO2: 97 % (04/27/24 1415) Vital Signs (24h  Range):  Temp:  [97.9 °F (36.6 °C)] 97.9 °F (36.6 °C)  Pulse:  [55-88] 88  Resp:  [10-23] 20  SpO2:  [94 %-98 %] 97 %  BP: ()/(53-59) 101/59     Weight: 86.2 kg (190 lb)  Body mass index is 31.62 kg/m².     Physical Exam  Vitals reviewed.   Constitutional:       General: She is not in acute distress.  HENT:      Head: Normocephalic and atraumatic.   Cardiovascular:      Rate and Rhythm: Normal rate and regular rhythm.      Heart sounds: Normal heart sounds. No murmur heard.  Pulmonary:      Effort: Pulmonary effort is normal. No respiratory distress.      Breath sounds: Normal breath sounds. No wheezing, rhonchi or rales.   Abdominal:      General: Abdomen is flat. There is no distension.      Palpations: Abdomen is soft.      Tenderness: There is no abdominal tenderness. There is no guarding.      Comments: Lap sites well-appearing   Neurological:      General: No focal deficit present.      Mental Status: She is alert and oriented to person, place, and time. Mental status is at baseline.   Psychiatric:         Mood and Affect: Affect normal.         Behavior: Behavior normal.         Thought Content: Thought content normal.                Significant Labs: All pertinent labs within the past 24 hours have been reviewed.    Significant Imaging: I have reviewed all pertinent imaging results/findings within the past 24 hours.    Admission on 04/27/2024   Component Date Value Ref Range Status    QRS Duration 04/27/2024 98  ms In process    [facility] QTC Calculation 04/27/2024 380  ms In process    WBC 04/27/2024 7.35  3.90 - 12.70 K/uL Final    RBC 04/27/2024 4.31  4.00 - 5.40 M/uL Final    Hemoglobin 04/27/2024 13.2  12.0 - 16.0 g/dL Final    Hematocrit 04/27/2024 39.8  37.0 - 48.5 % Final    MCV 04/27/2024 92  82 - 98 fL Final    MCH 04/27/2024 30.6  27.0 - 31.0 pg Final    MCHC 04/27/2024 33.2  32.0 - 36.0 g/dL Final    RDW 04/27/2024 12.2  11.5 - 14.5 % Final    Platelets 04/27/2024 200  150 - 450 K/uL Final    MPV  04/27/2024 11.0  9.2 - 12.9 fL Final    Immature Granulocytes 04/27/2024 0.5  0.0 - 0.5 % Final    Gran # (ANC) 04/27/2024 5.2  1.8 - 7.7 K/uL Final    Immature Grans (Abs) 04/27/2024 0.04  0.00 - 0.04 K/uL Final    Comment: Mild elevation in immature granulocytes is non specific and   can be seen in a variety of conditions including stress response,   acute inflammation, trauma and pregnancy. Correlation with other   laboratory and clinical findings is essential.      Lymph # 04/27/2024 1.4  1.0 - 4.8 K/uL Final    Mono # 04/27/2024 0.7  0.3 - 1.0 K/uL Final    Eos # 04/27/2024 0.1  0.0 - 0.5 K/uL Final    Baso # 04/27/2024 0.03  0.00 - 0.20 K/uL Final    nRBC 04/27/2024 0  0 /100 WBC Final    Gran % 04/27/2024 70.0  38.0 - 73.0 % Final    Lymph % 04/27/2024 18.8  18.0 - 48.0 % Final    Mono % 04/27/2024 9.3  4.0 - 15.0 % Final    Eosinophil % 04/27/2024 1.0  0.0 - 8.0 % Final    Basophil % 04/27/2024 0.4  0.0 - 1.9 % Final    Differential Method 04/27/2024 Automated   Final    Sodium 04/27/2024 140  136 - 145 mmol/L Final    Potassium 04/27/2024 3.9  3.5 - 5.1 mmol/L Final    Chloride 04/27/2024 109  95 - 110 mmol/L Final    CO2 04/27/2024 25  23 - 29 mmol/L Final    Glucose 04/27/2024 120 (H)  70 - 110 mg/dL Final    BUN 04/27/2024 17  6 - 20 mg/dL Final    Creatinine 04/27/2024 0.9  0.5 - 1.4 mg/dL Final    Calcium 04/27/2024 8.6 (L)  8.7 - 10.5 mg/dL Final    Total Protein 04/27/2024 6.6  6.0 - 8.4 g/dL Final    Albumin 04/27/2024 4.0  3.5 - 5.2 g/dL Final    Total Bilirubin 04/27/2024 0.5  0.1 - 1.0 mg/dL Final    Comment: For infants and newborns, interpretation of results should be based  on gestational age, weight and in agreement with clinical  observations.    Premature Infant recommended reference ranges:  Up to 24 hours.............<8.0 mg/dL  Up to 48 hours............<12.0 mg/dL  3-5 days..................<15.0 mg/dL  6-29 days.................<15.0 mg/dL      Alkaline Phosphatase 04/27/2024 34 (L)   55 - 135 U/L Final    AST 04/27/2024 19  10 - 40 U/L Final    ALT 04/27/2024 22  10 - 44 U/L Final    eGFR 04/27/2024 >60.0  >60 mL/min/1.73 m^2 Final    Anion Gap 04/27/2024 6 (L)  8 - 16 mmol/L Final    Specimen UA 04/27/2024 Urine, Clean Catch   Final    Color, UA 04/27/2024 Yellow  Yellow, Straw, Chelsie Final    Appearance, UA 04/27/2024 Clear  Clear Final    pH, UA 04/27/2024 6.0  5.0 - 8.0 Final    Specific Gravity, UA 04/27/2024 >1.030 (A)  1.005 - 1.030 Final    Protein, UA 04/27/2024 Trace (A)  Negative Final    Comment: Recommend a 24 hour urine protein or a urine   protein/creatinine ratio if globulin induced proteinuria is  clinically suspected.      Glucose, UA 04/27/2024 Negative  Negative Final    Ketones, UA 04/27/2024 Negative  Negative Final    Bilirubin (UA) 04/27/2024 Negative  Negative Final    Occult Blood UA 04/27/2024 3+ (A)  Negative Final    Nitrite, UA 04/27/2024 Negative  Negative Final    Urobilinogen, UA 04/27/2024 Negative  Negative EU/dL Final    Leukocytes, UA 04/27/2024 Negative  Negative Final    Magnesium 04/27/2024 2.0  1.6 - 2.6 mg/dL Final    POC Creatinine 04/27/2024 0.9  0.5 - 1.4 mg/dL Final    Sample 04/27/2024 VENOUS   Final    Troponin I High Sensitivity 04/27/2024 <2.3  0.0 - 14.9 pg/mL Final    Comment: Troponin results differ between methods. Do not use   results between Troponin methods interchangeably.    Alkaline Phospatase levels above 400 U/L may   cause false positive results.    Access hsTnI should not be used for patients taking   Asfotase chito (Strensiq).      POC Preg Test, Ur 04/27/2024 Negative  Negative Final     Acceptable 04/27/2024 Yes   Final    RBC, UA 04/27/2024 5 (H)  0 - 4 /hpf Final    WBC, UA 04/27/2024 2  0 - 5 /hpf Final    Bacteria 04/27/2024 Rare  None-Occ /hpf Final    Squam Epithel, UA 04/27/2024 1  /hpf Final    Hyaline Casts, UA 04/27/2024 3 (A)  0-1/lpf /lpf Final    Microscopic Comment 04/27/2024 SEE COMMENT   Final     Comment: Other formed elements not mentioned in the report are not   present in the microscopic examination.             X-Ray Chest AP Portable    Result Date: 4/27/2024  EXAMINATION: XR CHEST AP PORTABLE CLINICAL HISTORY: Syncope and collapse TECHNIQUE: Single frontal view of the chest was performed. COMPARISON: None FINDINGS: The lungs are clear with normal appearance of pulmonary vasculature. No pleural effusion. No evident pneumothorax. The cardiac silhouette is normal in size. The hilar and mediastinal contours are unremarkable. Bones are intact.     No acute abnormality. Electronically signed by: Nathen Lopez MD Date:    04/27/2024 Time:    07:12    CT Abdomen Pelvis With IV Contrast NO Oral Contrast    Result Date: 4/27/2024  EXAMINATION: CT ABDOMEN PELVIS WITH IV CONTRAST CLINICAL HISTORY: Peritonitis or perforation suspected; TECHNIQUE: Low dose axial images, sagittal and coronal reformations were obtained from the lung bases to the pubic symphysis following the IV administration of 100 mL of Omnipaque 350 COMPARISON: 09/25/2023. FINDINGS: History of cholecystectomy 04/25/2024 Abdomen: - Lower thorax:Trace free air identified subdiaphragmatic (series 3, image 49 presumably postoperative given focal nature. - Lung bases: No infiltrates and no nodules. - Liver: No focal mass. - Gallbladder: Cholecystectomy.  No focal collection at operative bed to suggest abscess. - Bile Ducts: Mild prominence of the extrahepatic and proximal common bile ducts yet no significant intrahepatic biliary ductal dilatation. - Spleen: Negative. - Kidneys: No mass or hydronephrosis. - Adrenals: Unremarkable. - Pancreas: No mass or peripancreatic fat stranding. - Retroperitoneum:  No significant adenopathy. - Vascular: No abdominal aortic aneurysm. Pelvis: No pelvic mass, or adenopathy.  There is free fluid within the pelvis.  This extends along the RIGHT pericolic gutter.  Clinical considerations include that of ruptured ovarian  cysts, biliary leak, peritonitis although the appearance is nonspecific. Bowel/Mesentery: No evidence of bowel obstruction or inflammation.  The appendix is not visualized with suture material at that level presumably S/P previous appendectomy. Bones:  No acute osseous abnormality and no suspicious lytic or blastic lesion.     S/P cholecystectomy (recent) per history with mild prominence of extrahepatic and proximal intrahepatic ducts.  This is within expected limits of normal postoperative appearance although correlation advised to exclude early pathologic dilatation. Tiny focus of intraperitoneal air, focal nature, likely postoperative. Free fluid within the pelvis and RIGHT pericolic gutter questionable etiology/significance.  Correlation advised in this regard particularly in light of the recent surgical history.  No evidence for focal abscess. This report was flagged in Epic as abnormal. Electronically signed by: Nathen Lopez MD Date:    04/27/2024 Time:    06:37       Assessment/Plan:     * Syncope  Likely from combo of narcotics, post-op state, and vasovagal on toilet  Complicated by reported bradycardia  - tele  - orthostatic BP  - carotid US  - echo  - trend condition    S/P cholecystectomy  Noted hx. CT fluid collections appear to be post-op related  - prn pain meds      VTE Risk Mitigation (From admission, onward)           Ordered     IP VTE HIGH RISK PATIENT  Once         04/27/24 0858     Place sequential compression device  Until discontinued         04/27/24 0858                       On 04/27/2024, patient should be placed in hospital observation services under my care.             Berlin Dotson MD  Department of Hospital Medicine  [facility] - Emergency Dept

## 2024-04-27 NOTE — ED PROVIDER NOTES
Encounter Date: 4/27/2024       History     Chief Complaint   Patient presents with    Loss of Consciousness     Patient had gallbladder removed Thursday. States she woke up this am with severe lower abdominal pain, walked to bathroom, became dizzy and had blurry vision and passed out.      Emergent evaluation of a 28-year-old female with history of GERD, elevated BMI, status post appendectomy in 2009 2010, laparoscopic cholecystectomy on April 25, 2024 patient reports she went home on Thursday.  She has been having abdominal pain since.  She has been taking Norco for pain.  Tonight she woke up just prior to arrival and felt the need to have a bowel movement she went into the bathroom she became nauseous became dizzy lightheaded had cold sweats and called to her wife.  Wife came in reported that she was sweaty pale and nauseous she began start vomiting.  And syncopized for several seconds she had not fall or injure herself as her wife caught her.  She reports she then syncopized again.  For short period of time.  EMS was called they reported she was bradycardic with hypotension started a 500 mL normal saline bolus.  She reports she was not had any fevers.      Review of patient's allergies indicates:   Allergen Reactions    Covid-19 vaccine, [id number], lnp-s (moderna) Rash     Past Medical History:   Diagnosis Date    GERD (gastroesophageal reflux disease)      Past Surgical History:   Procedure Laterality Date    APPENDECTOMY      [id number]    GANGLION CYST EXCISION      LAPAROSCOPIC CHOLECYSTECTOMY N/A 4/25/2024    Procedure: CHOLECYSTECTOMY, LAPAROSCOPIC;  Surgeon: Nathen Truong MD;  Location: [facility];  Service: General;  Laterality: N/A;    WISDOM TOOTH EXTRACTION       Family History   Problem Relation Name Age of Onset    Colon cancer Neg Hx      Esophageal cancer Neg Hx      Liver cancer Neg Hx       Social History     Tobacco Use    Smoking status: Former     Types: Vaping with nicotine     Passive  exposure: Never    Smokeless tobacco: Never     Review of Systems   Constitutional:  Positive for activity change, diaphoresis and fatigue. Negative for appetite change, chills and fever.   HENT:  Negative for congestion, postnasal drip, rhinorrhea and sore throat.    Eyes:  Positive for visual disturbance.   Respiratory:  Negative for cough, chest tightness, shortness of breath, wheezing and stridor.    Cardiovascular:  Negative for chest pain and palpitations.   Gastrointestinal:  Positive for abdominal pain, nausea and vomiting. Negative for abdominal distention, blood in stool, constipation and diarrhea.   Genitourinary:  Negative for dysuria, flank pain, frequency, hematuria and urgency.   Musculoskeletal:  Negative for arthralgias, back pain, myalgias, neck pain and neck stiffness.   Skin:  Positive for pallor. Negative for rash.   Neurological:  Positive for syncope and weakness. Negative for dizziness, light-headedness and headaches.   Hematological:  Does not bruise/bleed easily.   Psychiatric/Behavioral:  Negative for confusion. The patient is not nervous/anxious.    All other systems reviewed and are negative.      Physical Exam     Initial Vitals   BP Pulse Resp Temp SpO2   04/27/24 0504 04/27/24 0504 04/27/24 0504 04/27/24 0530 04/27/24 0504   (!) 96/53 60 19 97.9 °F (36.6 °C) 98 %      MAP       --                Physical Exam    Nursing note and vitals reviewed.  Constitutional: She appears well-developed and well-nourished. She is not diaphoretic. She appears distressed.   HENT:   Head: Normocephalic and atraumatic.   Right Ear: External ear normal.   Left Ear: External ear normal.   Nose: Nose normal.   Mouth/Throat: Oropharynx is clear and moist.   Eyes: Conjunctivae and EOM are normal. Pupils are equal, round, and reactive to light.   Neck: Neck supple. No tracheal deviation present.   Normal range of motion.  Cardiovascular:  Regular rhythm, normal heart sounds and intact distal pulses.     Exam  reveals no gallop and no friction rub.       No murmur heard.  Heart rate 45-61.  Blood pressure 96/53   Pulmonary/Chest: Breath sounds normal. No stridor. No respiratory distress. She has no wheezes. She has no rhonchi. She has no rales. She exhibits no tenderness.   Abdominal: Abdomen is soft. Bowel sounds are normal. She exhibits no distension and no mass. There is abdominal tenderness.   Decreased bowel sounds.  Generalized tenderness throughout the abdomen with voluntary guarding and rebound.  No rigidity.  Patient's laparoscopic incision sites are clean dry and intact. There is rebound and guarding.   Musculoskeletal:         General: No edema. Normal range of motion.      Cervical back: Normal range of motion and neck supple.     Neurological: She is alert and oriented to person, place, and time. She has normal strength. No cranial nerve deficit or sensory deficit.   Skin: Skin is warm and dry. No rash noted. No erythema. No pallor.   Psychiatric: She has a normal mood and affect. Her behavior is normal. Judgment and thought content normal.         ED Course   Procedures  Labs Reviewed   COMPREHENSIVE METABOLIC PANEL - Abnormal; Notable for the following components:       Result Value    Glucose 120 (*)     Calcium 8.6 (*)     Alkaline Phosphatase 34 (*)     Anion Gap 6 (*)     All other components within normal limits   URINALYSIS, REFLEX TO URINE CULTURE - Abnormal; Notable for the following components:    Specific Gravity, UA >1.030 (*)     Protein, UA Trace (*)     Occult Blood UA 3+ (*)     All other components within normal limits    Narrative:     Specimen Source->Urine   URINALYSIS MICROSCOPIC - Abnormal; Notable for the following components:    RBC, UA 5 (*)     Hyaline Casts, UA 3 (*)     All other components within normal limits    Narrative:     Specimen Source->Urine   CBC W/ AUTO DIFFERENTIAL   MAGNESIUM   TROPONIN I HIGH SENSITIVITY   TROPONIN I HIGH SENSITIVITY   TSH   POCT URINE PREGNANCY    ISTAT CREATININE   POCT CREATININE     EKG Readings: (Independently Interpreted)   Initial Reading: No STEMI. Rhythm: Sinus Bradycardia. Heart Rate: 48. Ectopy: No Ectopy. Conduction: Normal.   Early repolarization     ECG Results              EKG 12-lead (In process)        Collection Time Result Time QRS Duration OHS QTC Calculation    04/27/24 05:05:36 04/27/24 05:25:22 98 380                     In process by Interface, Lab In [facility] (04/27/24 05:25:26)                   Narrative:    Test Reason : R55,    Vent. Rate : 048 BPM     Atrial Rate : 048 BPM     P-R Int : 184 ms          QRS Dur : 098 ms      QT Int : 426 ms       P-R-T Axes : 029 042 041 degrees     QTc Int : 380 ms    Sinus bradycardia with sinus arrhythmia  ST elevation, consider early repolarization  Borderline Abnormal ECG  No previous ECGs available    Referred By: [email or web address]   SELF           Confirmed By:                                   Imaging Results              X-Ray Chest AP Portable (Final result)  Result time 04/27/24 07:12:31      Final result by Nathen Lopez MD (04/27/24 07:12:31)                   Impression:      No acute abnormality.      Electronically signed by: Nathen Lopez MD  Date:    04/27/2024  Time:    07:12               Narrative:    EXAMINATION:  XR CHEST AP PORTABLE    CLINICAL HISTORY:  Syncope and collapse    TECHNIQUE:  Single frontal view of the chest was performed.    COMPARISON:  None    FINDINGS:  The lungs are clear with normal appearance of pulmonary vasculature. No pleural effusion. No evident pneumothorax.    The cardiac silhouette is normal in size. The hilar and mediastinal contours are unremarkable.    Bones are intact.                                        CT Abdomen Pelvis With IV Contrast NO Oral Contrast (Final result)  Result time 04/27/24 06:37:02      Final result by Nathen Lopez MD (04/27/24 06:37:02)                   Impression:      S/P cholecystectomy (recent) per history  with mild prominence of extrahepatic and proximal intrahepatic ducts.  This is within expected limits of normal postoperative appearance although correlation advised to exclude early pathologic dilatation.    Tiny focus of intraperitoneal air, focal nature, likely postoperative.    Free fluid within the pelvis and RIGHT pericolic gutter questionable etiology/significance.  Correlation advised in this regard particularly in light of the recent surgical history.  No evidence for focal abscess.    This report was flagged in Epic as abnormal.      Electronically signed by: Nathen Lopez MD  Date:    04/27/2024  Time:    06:37               Narrative:    EXAMINATION:  CT ABDOMEN PELVIS WITH IV CONTRAST    CLINICAL HISTORY:  Peritonitis or perforation suspected;    TECHNIQUE:  Low dose axial images, sagittal and coronal reformations were obtained from the lung bases to the pubic symphysis following the IV administration of 100 mL of Omnipaque 350    COMPARISON:  09/25/2023.    FINDINGS:  History of cholecystectomy 04/25/2024    Abdomen:    - Lower thorax:Trace free air identified subdiaphragmatic (series 3, image 49 presumably postoperative given focal nature.    - Lung bases: No infiltrates and no nodules.    - Liver: No focal mass.    - Gallbladder: Cholecystectomy.  No focal collection at operative bed to suggest abscess.    - Bile Ducts: Mild prominence of the extrahepatic and proximal common bile ducts yet no significant intrahepatic biliary ductal dilatation.    - Spleen: Negative.    - Kidneys: No mass or hydronephrosis.    - Adrenals: Unremarkable.    - Pancreas: No mass or peripancreatic fat stranding.    - Retroperitoneum:  No significant adenopathy.    - Vascular: No abdominal aortic aneurysm.    Pelvis:    No pelvic mass, or adenopathy.  There is free fluid within the pelvis.  This extends along the RIGHT pericolic gutter.  Clinical considerations include that of ruptured ovarian cysts, biliary leak,  peritonitis although the appearance is nonspecific.    Bowel/Mesentery:    No evidence of bowel obstruction or inflammation.  The appendix is not visualized with suture material at that level presumably S/P previous appendectomy.    Bones:  No acute osseous abnormality and no suspicious lytic or blastic lesion.                                       Medications   0.9%  NaCl infusion (has no administration in time range)   0.9%  NaCl infusion (has no administration in time range)   sodium chloride 0.9% bolus 1,000 mL 1,000 mL (0 mLs Intravenous Stopped 4/27/24 0640)   ondansetron injection 4 mg (4 mg Intravenous Given 4/27/24 0518)   fentaNYL 50 mcg/mL injection 50 mcg (50 mcg Intravenous Given 4/27/24 0521)   iohexoL (OMNIPAQUE 350) injection 100 mL (100 mLs Intravenous Given 4/27/24 0607)   ketorolac injection 15 mg (15 mg Intravenous Given 4/27/24 0851)     Medical Decision Making  Emergent evaluation of a 28-year-old female with history of GERD, elevated BMI, status post appendectomy in 2009 2010, laparoscopic cholecystectomy on April 25, 2024 patient reports she went home on Thursday.  She has been having abdominal pain since.  She has been taking Norco for pain.  Tonight she woke up just prior to arrival and felt the need to have a bowel movement she went into the bathroom she became nauseous became dizzy lightheaded had cold sweats and called to her wife.  Wife came in reported that she was sweaty pale and nauseous she began start vomiting.  And syncopized for several seconds she had not fall or injure herself as her wife caught her.  She reports she then syncopized again.  For short period of time.  EMS was called they reported she was bradycardic with hypotension started a 500 mL normal saline bolus.  She reports she was not had any fevers.  On physical exam patient is in a great deal pain with her abdominal pain which worsens with movement of the bed and even light auscultation or light palpation with  stethoscope.  Abdomen is not firm.  Positive voluntary guarding and rebound.  Laparoscopic sites are clean dry and intact.  No pallor.  No diaphoresis at this time heart rate is 45-60 blood pressure 96/53.  Normal cardiac and lung exam  [facility]    Patient presents for emergent evaluation of acute abdominal pain with syncope nausea vomiting since cholecystectomy on 04/25 that poses a possible threat to life and/or bodily function.    Differential diagnosis includes but is not limited to acute pancreatitis, acute cholangitis, biliary leak, abscess at surgical site, per viscus, postop pain from intra-abdominal air, postop bleeding, colitis, acute appendicitis, urinary tract infection, ovarian torsion, PID TOA pyelonephritis, kidney stone, volvulus, small bowel obstruction, enteritis, gastritis, colitis, mesenteric ischemia, AAA, AAA rupture, aortic dissection, constipation, upper or lower gi bleeding, traumatic injury.     I ordered labs and personally reviewed them.  Labs significant for see below  I ordered EKG and personally reviewed it.  EKG significant for see below  I ordered CT abdomen pelvis IV contrast completed results pending at this time      MDM  Patient was managed in the ED with IV will complete 500 mL normal saline bolus NS as well as 1 L bolus, fentanyl 50 mcg for pain 4 of Zofran.  Patient will go immediately to CT.  Zoll at bedside.  Pacer pads on chest she was not treated for her bradycardia at this time  Sylvie Ozuna M.D.  5:25 AM 4/27/2024    The response to treatment was stable.  Patient was returned from CT reports pain is improved blood pressure 90s systolic heart rate now 68 she reports she was comfortable at this time care will be turned over to oncoming physician at the end of my shift  Sylvie Ozuna M.D.       Amount and/or Complexity of Data Reviewed  Labs: ordered.  Radiology: ordered.    Risk  Prescription drug management.  Decision regarding hospitalization.               ED Course  as of 04/27/24 0857   Sat Apr 27, 2024   0545 Normal CBC  Point of care creatinine 0.9 [RM]   0833 Patient was awake and alert in no acute distress.  Blood pressures still remain a little bit low in the 90s.  Patient CT scan shows no acute abnormality there is a mild amount of free fluid in the pelvis patient's hemoglobins are normal.  UPT is negative.  Does not appear to be any postoperative complication nonetheless because patient had some episodes of bradycardia in the ER with syncopal event we will observe the patient in the hospital.  She remained stable. [AP]   0856 Dr. White notified and reviewing images of CT recommends no acute intervention. [AP]      ED Course User Index  [AP] rAthur Gray MD  [RM] Sylvie Ozuna MD                             Clinical Impression:  Final diagnoses:  [R55] Syncope  [R00.1] Bradycardia (Primary)  [I95.9] Hypotension, unspecified hypotension type  [G89.18] Post-operative pain          ED Disposition Condition    Observation                 Arthur Gray MD  04/27/24 0836       Arthur Gray MD  04/27/24 0857

## 2024-04-27 NOTE — HPI
28F with history of recent cholecystectomy presents due to episodes of syncope at home while using the toilet. Events associated with nausea and vomiting. Found to be bradycardic on arrival which normalized. Given IVF, pain meds, antiemetic. Reports has been taking norcos and ibuprofen at home. Labs overall unremarkable. CT abd/pelv has some small fluid collections which seem consistent with the postoperative state. Admitted to hospital medicine service for continued observation and syncope evaluation.

## 2024-04-27 NOTE — ASSESSMENT & PLAN NOTE
Likely from combo of narcotics, post-op state, and vasovagal on toilet  Complicated by reported bradycardia  - tele  - orthostatic BP  - carotid US  - echo  - trend condition

## 2024-04-27 NOTE — PLAN OF CARE
[facility] - Emergency Dept  Initial Discharge Assessment       Primary Care Provider: No, Primary Doctor    Admission Diagnosis: Syncope [R55]    Admission Date: 4/27/2024  Expected Discharge Date:      completed discharge assessment with pt at bedside. Pt AAOx4s. Pt lives at home with spouse. Demographics, PCP, and insurance verified. No home health. No dialysis. Pt completes ADLs without assistance. Pt to discharge home via family transport. Pt has no other needs to be addressed at this time.     Transition of Care Barriers: None    Payor: BLUE CR[employer] BLUE [employer]IELD / Plan: [employer] [place] / Product Type: Commercial /     Extended Emergency Contact Information  Primary Emergency Contact: Laura Gonzáles  Address: [address] [place] [address] [facility]  Home Phone: [phone]  Mobile Phone: [phone]  Relation: Spouse  Preferred language: English   needed? No    Discharge Plan A: Home with family  Discharge Plan B: Home with family      [facility]  [address]  [facility] [address]  Phone: [phone] Fax: [phone]      Initial Assessment (most recent)       Adult Discharge Assessment - 04/27/24 1510          Discharge Assessment    Assessment Type Discharge Planning Assessment     Confirmed/corrected address, phone number and insurance Yes     Confirmed Demographics Correct on Facesheet     Source of Information patient     When was your last doctors appointment? --   Has not had one    Does patient/caregiver understand observation status Yes     Reason For Admission Syncope     People in Home significant other     Facility Arrived From: Home     Do you expect to return to your current living situation? Yes     Do you have help at home or someone to help you manage your care at home? Yes     Who are your caregiver(s) and their phone number(s)? Laura Gonzáles (Spouse) [phone]     Prior to  hospitilization cognitive status: Alert/Oriented     Current cognitive status: Alert/Oriented     Walking or Climbing Stairs Difficulty no     Dressing/Bathing Difficulty no     Home Accessibility wheelchair accessible     Home Layout Able to live on 1st floor     Equipment Currently Used at Home none     Readmission within 30 days? No     Patient currently being followed by outpatient case management? No     Do you currently have service(s) that help you manage your care at home? No     Do you take prescription medications? Yes     Do you have prescription coverage? Yes     Coverage [employer] - [employer] [facility] [place]     Do you have any problems affording any of your prescribed medications? No     Is the patient taking medications as prescribed? yes     Are you on dialysis? No     Do you take coumadin? No     Discharge Plan A Home with family     Discharge Plan B Home with family     DME Needed Upon Discharge  none     Discharge Plan discussed with: Spouse/sig other;Patient     Name(s) and Number(s) Laura Gonzáles (Spouse) [phone]     Transition of Care Barriers None        OTHER    Name(s) of People in Home Pt and Laura Gonzáles (Spouse) [phone]

## 2024-04-27 NOTE — CONSULTS
This is a 28-year-old female who underwent recent cholecystectomy.  She was at home recovering well until she had an episode of syncope which prompted ED presentation.  Since presentation, she seems to be recovering well.  She thinks that this may have been related to narcotic use.  Abdominal pain is improving since surgery.      Vitals are currently stable   Abdomen soft    Labs reviewed, no leukocytosis.  LFTs are normal.      CT imaging was reviewed.  Normal postop findings in the gallbladder fossa.  There was a small amount of free fluid in the pelvis.      28-year-old female being admitted for observation for a syncopal event after cholecystectomy.  CT findings are likely normal.  Patient recently started her cycle which could be an alternative explanation for the free fluid seen in the pelvis.  The gallbladder fossa appears normal after surgery.  Agree with observation for syncope.  If patient remains stable, okay for DC tomorrow.

## 2024-04-27 NOTE — SUBJECTIVE & OBJECTIVE
Past Medical History:   Diagnosis Date    GERD (gastroesophageal reflux disease)        Past Surgical History:   Procedure Laterality Date    APPENDECTOMY      [id number]    GANGLION CYST EXCISION      LAPAROSCOPIC CHOLECYSTECTOMY N/A 4/25/2024    Procedure: CHOLECYSTECTOMY, LAPAROSCOPIC;  Surgeon: Nathen Truong MD;  Location: [facility];  Service: General;  Laterality: N/A;    WISDOM TOOTH EXTRACTION         Review of patient's allergies indicates:   Allergen Reactions    Covid-19 vaccine, [id number], lnp-s (moderna) Rash       Current Facility-Administered Medications   Medication Dose Route Frequency Provider Last Rate Last Admin    0.9%  NaCl infusion   Intravenous Continuous Berlin Dotson MD 75 mL/hr at 04/27/24 1117 New Bag at 04/27/24 1117    acetaminophen tablet 650 mg  650 mg Oral Q6H PRN Berlin Dotson MD   650 mg at 04/27/24 1336    aluminum-magnesium hydroxide-simethicone 200-200-20 mg/5 mL suspension 30 mL  30 mL Oral QID PRN Berlin Dotson MD        dextrose 50% injection 12.5 g  12.5 g Intravenous PRN Berlin Dotson MD        dextrose 50% injection 25 g  25 g Intravenous PRN Berlin Dotson MD        glucagon (human recombinant) injection 1 mg  1 mg Intramuscular PRN Berlin Dotson MD        glucose chewable tablet 16 g  16 g Oral PRN Berlin Dotson MD        glucose chewable tablet 24 g  24 g Oral PRN Berlin Dotson MD        HYDROcodone-acetaminophen  mg per tablet 1 tablet  1 tablet Oral Q6H PRN Berlin Dotson MD        HYDROcodone-acetaminophen 5-325 mg per tablet 1 tablet  1 tablet Oral Q6H PRN Berlin Dotson MD        magnesium oxide tablet 800 mg  800 mg Oral PRN Berlin Dotson MD        magnesium oxide tablet 800 mg  800 mg Oral PRN Berlin Dotson MD        melatonin tablet 6 mg  6 mg Oral Nightly PRN eBrlin Dotson MD        naloxone 0.4 mg/mL injection 0.02 mg  0.02 mg Intravenous PRN Berlin Dotson MD        ondansetron disintegrating tablet 8 mg  8 mg Oral Q8H PRN  Berlin Dotson MD        pantoprazole EC tablet 40 mg  40 mg Oral Daily Berlin Dotson MD   40 mg at 04/27/24 1016    potassium bicarbonate disintegrating tablet 35 mEq  35 mEq Oral PRN Berlin Dotson MD        potassium bicarbonate disintegrating tablet 50 mEq  50 mEq Oral PRN Berlin Dotson MD        potassium bicarbonate disintegrating tablet 60 mEq  60 mEq Oral PRN Berlin Dotson MD        potassium, sodium phosphates 280-160-250 mg packet 2 packet  2 packet Oral PRN Berlin Dotson MD        potassium, sodium phosphates 280-160-250 mg packet 2 packet  2 packet Oral PRN Berlin Dotson MD        potassium, sodium phosphates 280-160-250 mg packet 2 packet  2 packet Oral PRN Berlin Dotson MD        sodium chloride 0.9% flush 10 mL  10 mL Intravenous Q12H PRN Berlin Dotson MD         Current Outpatient Medications   Medication Sig Dispense Refill    HYDROcodone-acetaminophen (NORCO) 5-325 mg per tablet Take 1 tablet by mouth every 6 (six) hours as needed for Pain. 20 tablet 0    omeprazole (PRILOSEC) 40 MG capsule Take 1 capsule (40 mg total) by mouth Daily. 90 capsule 1    ondansetron (ZOFRAN-ODT) 4 MG TbDL Take 8 mg by mouth every 12 (twelve) hours as needed.      buPROPion (WELLBUTRIN XL) 150 MG TB24 tablet Take 150 mg by mouth once daily.       Facility-Administered Medications Ordered in Other Encounters   Medication Dose Route Frequency Provider Last Rate Last Admin    electrolyte-S (ISOLYTE)   Intravenous Continuous Patricio Garcia MD 25 mL/hr at 04/25/24 1200 Rate Change at 04/25/24 1200    LIDOcaine (PF) 10 mg/ml (1%) injection 10 mg  1 mL Intradermal Once Patricio Garcia MD        ondansetron injection 4 mg  4 mg Intravenous Daily PRN Patricio Garcia MD        oxyCODONE immediate release tablet 5 mg  5 mg Oral Q3H PRN Patricio Garcia MD        sodium chloride 0.9% flush 3 mL  3 mL Intravenous PRN Patricio Garcia MD         Family History    None       Tobacco Use    Smoking status:  Former     Types: Vaping with nicotine     Passive exposure: Never    Smokeless tobacco: Never   Substance and Sexual Activity    Alcohol use: Not on file     Comment: NO    Drug use: Not on file     Comment: NO    Sexual activity: Not on file     Review of Systems   Constitutional:  Negative for chills and fever.   Respiratory:  Negative for shortness of breath.    Cardiovascular:  Negative for chest pain.   Gastrointestinal:  Positive for abdominal pain (post-op). Negative for constipation, diarrhea, nausea and vomiting.     Objective:     Vital Signs (Most Recent):  Temp: 97.9 °F (36.6 °C) (04/27/24 0530)  Pulse: 88 (04/27/24 1415)  Resp: 20 (04/27/24 1415)  BP: (!) 101/59 (04/27/24 1415)  SpO2: 97 % (04/27/24 1415) Vital Signs (24h Range):  Temp:  [97.9 °F (36.6 °C)] 97.9 °F (36.6 °C)  Pulse:  [55-88] 88  Resp:  [10-23] 20  SpO2:  [94 %-98 %] 97 %  BP: ()/(53-59) 101/59     Weight: 86.2 kg (190 lb)  Body mass index is 31.62 kg/m².     Physical Exam  Vitals reviewed.   Constitutional:       General: She is not in acute distress.  HENT:      Head: Normocephalic and atraumatic.   Cardiovascular:      Rate and Rhythm: Normal rate and regular rhythm.      Heart sounds: Normal heart sounds. No murmur heard.  Pulmonary:      Effort: Pulmonary effort is normal. No respiratory distress.      Breath sounds: Normal breath sounds. No wheezing, rhonchi or rales.   Abdominal:      General: Abdomen is flat. There is no distension.      Palpations: Abdomen is soft.      Tenderness: There is no abdominal tenderness. There is no guarding.      Comments: Lap sites well-appearing   Neurological:      General: No focal deficit present.      Mental Status: She is alert and oriented to person, place, and time. Mental status is at baseline.   Psychiatric:         Mood and Affect: Affect normal.         Behavior: Behavior normal.         Thought Content: Thought content normal.                Significant Labs: All pertinent labs  within the past 24 hours have been reviewed.    Significant Imaging: I have reviewed all pertinent imaging results/findings within the past 24 hours.    Admission on 04/27/2024   Component Date Value Ref Range Status    QRS Duration 04/27/2024 98  ms In process    [facility] QTC Calculation 04/27/2024 380  ms In process    WBC 04/27/2024 7.35  3.90 - 12.70 K/uL Final    RBC 04/27/2024 4.31  4.00 - 5.40 M/uL Final    Hemoglobin 04/27/2024 13.2  12.0 - 16.0 g/dL Final    Hematocrit 04/27/2024 39.8  37.0 - 48.5 % Final    MCV 04/27/2024 92  82 - 98 fL Final    MCH 04/27/2024 30.6  27.0 - 31.0 pg Final    MCHC 04/27/2024 33.2  32.0 - 36.0 g/dL Final    RDW 04/27/2024 12.2  11.5 - 14.5 % Final    Platelets 04/27/2024 200  150 - 450 K/uL Final    MPV 04/27/2024 11.0  9.2 - 12.9 fL Final    Immature Granulocytes 04/27/2024 0.5  0.0 - 0.5 % Final    Gran # (ANC) 04/27/2024 5.2  1.8 - 7.7 K/uL Final    Immature Grans (Abs) 04/27/2024 0.04  0.00 - 0.04 K/uL Final    Comment: Mild elevation in immature granulocytes is non specific and   can be seen in a variety of conditions including stress response,   acute inflammation, trauma and pregnancy. Correlation with other   laboratory and clinical findings is essential.      Lymph # 04/27/2024 1.4  1.0 - 4.8 K/uL Final    Mono # 04/27/2024 0.7  0.3 - 1.0 K/uL Final    Eos # 04/27/2024 0.1  0.0 - 0.5 K/uL Final    Baso # 04/27/2024 0.03  0.00 - 0.20 K/uL Final    nRBC 04/27/2024 0  0 /100 WBC Final    Gran % 04/27/2024 70.0  38.0 - 73.0 % Final    Lymph % 04/27/2024 18.8  18.0 - 48.0 % Final    Mono % 04/27/2024 9.3  4.0 - 15.0 % Final    Eosinophil % 04/27/2024 1.0  0.0 - 8.0 % Final    Basophil % 04/27/2024 0.4  0.0 - 1.9 % Final    Differential Method 04/27/2024 Automated   Final    Sodium 04/27/2024 140  136 - 145 mmol/L Final    Potassium 04/27/2024 3.9  3.5 - 5.1 mmol/L Final    Chloride 04/27/2024 109  95 - 110 mmol/L Final    CO2 04/27/2024 25  23 - 29 mmol/L Final    Glucose  04/27/2024 120 (H)  70 - 110 mg/dL Final    BUN 04/27/2024 17  6 - 20 mg/dL Final    Creatinine 04/27/2024 0.9  0.5 - 1.4 mg/dL Final    Calcium 04/27/2024 8.6 (L)  8.7 - 10.5 mg/dL Final    Total Protein 04/27/2024 6.6  6.0 - 8.4 g/dL Final    Albumin 04/27/2024 4.0  3.5 - 5.2 g/dL Final    Total Bilirubin 04/27/2024 0.5  0.1 - 1.0 mg/dL Final    Comment: For infants and newborns, interpretation of results should be based  on gestational age, weight and in agreement with clinical  observations.    Premature Infant recommended reference ranges:  Up to 24 hours.............<8.0 mg/dL  Up to 48 hours............<12.0 mg/dL  3-5 days..................<15.0 mg/dL  6-29 days.................<15.0 mg/dL      Alkaline Phosphatase 04/27/2024 34 (L)  55 - 135 U/L Final    AST 04/27/2024 19  10 - 40 U/L Final    ALT 04/27/2024 22  10 - 44 U/L Final    eGFR 04/27/2024 >60.0  >60 mL/min/1.73 m^2 Final    Anion Gap 04/27/2024 6 (L)  8 - 16 mmol/L Final    Specimen UA 04/27/2024 Urine, Clean Catch   Final    Color, UA 04/27/2024 Yellow  Yellow, Straw, Chelsie Final    Appearance, UA 04/27/2024 Clear  Clear Final    pH, UA 04/27/2024 6.0  5.0 - 8.0 Final    Specific Gravity, UA 04/27/2024 >1.030 (A)  1.005 - 1.030 Final    Protein, UA 04/27/2024 Trace (A)  Negative Final    Comment: Recommend a 24 hour urine protein or a urine   protein/creatinine ratio if globulin induced proteinuria is  clinically suspected.      Glucose, UA 04/27/2024 Negative  Negative Final    Ketones, UA 04/27/2024 Negative  Negative Final    Bilirubin (UA) 04/27/2024 Negative  Negative Final    Occult Blood UA 04/27/2024 3+ (A)  Negative Final    Nitrite, UA 04/27/2024 Negative  Negative Final    Urobilinogen, UA 04/27/2024 Negative  Negative EU/dL Final    Leukocytes, UA 04/27/2024 Negative  Negative Final    Magnesium 04/27/2024 2.0  1.6 - 2.6 mg/dL Final    POC Creatinine 04/27/2024 0.9  0.5 - 1.4 mg/dL Final    Sample 04/27/2024 VENOUS   Final    Troponin  I High Sensitivity 04/27/2024 <2.3  0.0 - 14.9 pg/mL Final    Comment: Troponin results differ between methods. Do not use   results between Troponin methods interchangeably.    Alkaline Phospatase levels above 400 U/L may   cause false positive results.    Access hsTnI should not be used for patients taking   Asfotase chito (Strensiq).      POC Preg Test, Ur 04/27/2024 Negative  Negative Final     Acceptable 04/27/2024 Yes   Final    RBC, UA 04/27/2024 5 (H)  0 - 4 /hpf Final    WBC, UA 04/27/2024 2  0 - 5 /hpf Final    Bacteria 04/27/2024 Rare  None-Occ /hpf Final    Squam Epithel, UA 04/27/2024 1  /hpf Final    Hyaline Casts, UA 04/27/2024 3 (A)  0-1/lpf /lpf Final    Microscopic Comment 04/27/2024 SEE COMMENT   Final    Comment: Other formed elements not mentioned in the report are not   present in the microscopic examination.             X-Ray Chest AP Portable    Result Date: 4/27/2024  EXAMINATION: XR CHEST AP PORTABLE CLINICAL HISTORY: Syncope and collapse TECHNIQUE: Single frontal view of the chest was performed. COMPARISON: None FINDINGS: The lungs are clear with normal appearance of pulmonary vasculature. No pleural effusion. No evident pneumothorax. The cardiac silhouette is normal in size. The hilar and mediastinal contours are unremarkable. Bones are intact.     No acute abnormality. Electronically signed by: Nathen Lopez MD Date:    04/27/2024 Time:    07:12    CT Abdomen Pelvis With IV Contrast NO Oral Contrast    Result Date: 4/27/2024  EXAMINATION: CT ABDOMEN PELVIS WITH IV CONTRAST CLINICAL HISTORY: Peritonitis or perforation suspected; TECHNIQUE: Low dose axial images, sagittal and coronal reformations were obtained from the lung bases to the pubic symphysis following the IV administration of 100 mL of Omnipaque 350 COMPARISON: 09/25/2023. FINDINGS: History of cholecystectomy 04/25/2024 Abdomen: - Lower thorax:Trace free air identified subdiaphragmatic (series 3, image 49  presumably postoperative given focal nature. - Lung bases: No infiltrates and no nodules. - Liver: No focal mass. - Gallbladder: Cholecystectomy.  No focal collection at operative bed to suggest abscess. - Bile Ducts: Mild prominence of the extrahepatic and proximal common bile ducts yet no significant intrahepatic biliary ductal dilatation. - Spleen: Negative. - Kidneys: No mass or hydronephrosis. - Adrenals: Unremarkable. - Pancreas: No mass or peripancreatic fat stranding. - Retroperitoneum:  No significant adenopathy. - Vascular: No abdominal aortic aneurysm. Pelvis: No pelvic mass, or adenopathy.  There is free fluid within the pelvis.  This extends along the RIGHT pericolic gutter.  Clinical considerations include that of ruptured ovarian cysts, biliary leak, peritonitis although the appearance is nonspecific. Bowel/Mesentery: No evidence of bowel obstruction or inflammation.  The appendix is not visualized with suture material at that level presumably S/P previous appendectomy. Bones:  No acute osseous abnormality and no suspicious lytic or blastic lesion.     S/P cholecystectomy (recent) per history with mild prominence of extrahepatic and proximal intrahepatic ducts.  This is within expected limits of normal postoperative appearance although correlation advised to exclude early pathologic dilatation. Tiny focus of intraperitoneal air, focal nature, likely postoperative. Free fluid within the pelvis and RIGHT pericolic gutter questionable etiology/significance.  Correlation advised in this regard particularly in light of the recent surgical history.  No evidence for focal abscess. This report was flagged in Epic as abnormal. Electronically signed by: Nathen Lopez MD Date:    04/27/2024 Time:    06:37

## 2024-04-28 ENCOUNTER — CLINICAL SUPPORT (OUTPATIENT)
Dept: CARDIOLOGY | Facility: HOSPITAL | Age: 29
End: 2024-04-28
Attending: STUDENT IN AN ORGANIZED HEALTH CARE EDUCATION/TRAINING PROGRAM
Payer: COMMERCIAL

## 2024-04-28 VITALS
TEMPERATURE: 98 F | DIASTOLIC BLOOD PRESSURE: 71 MMHG | WEIGHT: 190 LBS | SYSTOLIC BLOOD PRESSURE: 112 MMHG | OXYGEN SATURATION: 97 % | HEART RATE: 66 BPM | RESPIRATION RATE: 17 BRPM | BODY MASS INDEX: 31.65 KG/M2 | HEIGHT: 65 IN

## 2024-04-28 VITALS — WEIGHT: 190.06 LBS | HEIGHT: 65 IN | BODY MASS INDEX: 31.67 KG/M2

## 2024-04-28 LAB
AORTIC ROOT ANNULUS: 2.5 CM
AORTIC VALVE CUSP SEPERATION: 1.8 CM
AV INDEX (PROSTH): 0.88
AV MEAN GRADIENT: 4 MMHG
AV PEAK GRADIENT: 7 MMHG
AV VALVE AREA BY VELOCITY RATIO: 2.91 CM²
AV VALVE AREA: 2.77 CM²
AV VELOCITY RATIO: 0.93
BSA FOR ECHO PROCEDURE: 1.99 M2
CV ECHO LV RWT: 0.48 CM
DOP CALC AO PEAK VEL: 1.36 M/S
DOP CALC AO VTI: 30.6 CM
DOP CALC LVOT AREA: 3.1 CM2
DOP CALC LVOT DIAMETER: 2 CM
DOP CALC LVOT PEAK VEL: 1.26 M/S
DOP CALC LVOT STROKE VOLUME: 84.78 CM3
DOP CALC MV VTI: 30.4 CM
DOP CALCLVOT PEAK VEL VTI: 27 CM
E WAVE DECELERATION TIME: 218 MSEC
E/A RATIO: 1.83
E/E' RATIO: 5.33 M/S
ECHO LV POSTERIOR WALL: 0.95 CM (ref 0.6–1.1)
FRACTIONAL SHORTENING: 36 % (ref 28–44)
INTERVENTRICULAR SEPTUM: 0.93 CM (ref 0.6–1.1)
IVC DIAMETER: 1.92 CM
LEFT INTERNAL DIMENSION IN SYSTOLE: 2.53 CM (ref 2.1–4)
LEFT VENTRICLE DIASTOLIC VOLUME INDEX: 35.67 ML/M2
LEFT VENTRICLE DIASTOLIC VOLUME: 69.2 ML
LEFT VENTRICLE MASS INDEX: 60 G/M2
LEFT VENTRICLE SYSTOLIC VOLUME INDEX: 11.9 ML/M2
LEFT VENTRICLE SYSTOLIC VOLUME: 23 ML
LEFT VENTRICULAR INTERNAL DIMENSION IN DIASTOLE: 3.98 CM (ref 3.5–6)
LEFT VENTRICULAR MASS: 115.57 G
LV LATERAL E/E' RATIO: 4 M/S
LV SEPTAL E/E' RATIO: 8 M/S
LVOT MG: 3 MMHG
LVOT MV: 0.84 CM/S
MV MEAN GRADIENT: 2 MMHG
MV PEAK A VEL: 0.48 M/S
MV PEAK E VEL: 0.88 M/S
MV PEAK GRADIENT: 5 MMHG
MV VALVE AREA BY CONTINUITY EQUATION: 2.79 CM2
OHS LV EJECTION FRACTION SIMPSONS BIPLANE MOD: 58 %
PISA MRMAX VEL: 2.46 M/S
PISA TR MAX VEL: 2.01 M/S
PV MV: 0.66 M/S
PV PEAK GRADIENT: 4 MMHG
PV PEAK VELOCITY: 0.95 M/S
RA PRESSURE ESTIMATED: 3 MMHG
RV TB RVSP: 5 MMHG
RV TISSUE DOPPLER FREE WALL SYSTOLIC VELOCITY 1 (APICAL 4 CHAMBER VIEW): 14.1 CM/S
TDI LATERAL: 0.22 M/S
TDI SEPTAL: 0.11 M/S
TDI: 0.17 M/S
TR MAX PG: 16 MMHG
TRICUSPID ANNULAR PLANE SYSTOLIC EXCURSION: 3 CM
TV REST PULMONARY ARTERY PRESSURE: 19 MMHG
Z-SCORE OF LEFT VENTRICULAR DIMENSION IN END DIASTOLE: -3.25
Z-SCORE OF LEFT VENTRICULAR DIMENSION IN END SYSTOLE: -2.32

## 2024-04-28 PROCEDURE — 96361 HYDRATE IV INFUSION ADD-ON: CPT

## 2024-04-28 PROCEDURE — 93306 TTE W/DOPPLER COMPLETE: CPT | Mod: 26,,, | Performed by: INTERNAL MEDICINE

## 2024-04-28 PROCEDURE — 93306 TTE W/DOPPLER COMPLETE: CPT

## 2024-04-28 PROCEDURE — 25000003 PHARM REV CODE 250: Performed by: STUDENT IN AN ORGANIZED HEALTH CARE EDUCATION/TRAINING PROGRAM

## 2024-04-28 PROCEDURE — G0378 HOSPITAL OBSERVATION PER HR: HCPCS

## 2024-04-28 RX ADMIN — ACETAMINOPHEN 650 MG: 325 TABLET ORAL at 06:04

## 2024-04-28 RX ADMIN — SODIUM CHLORIDE: 9 INJECTION, SOLUTION INTRAVENOUS at 12:04

## 2024-04-28 RX ADMIN — PANTOPRAZOLE SODIUM 40 MG: 40 TABLET, DELAYED RELEASE ORAL at 06:04

## 2024-04-28 RX ADMIN — ONDANSETRON 8 MG: 4 TABLET, ORALLY DISINTEGRATING ORAL at 06:04

## 2024-04-28 NOTE — DISCHARGE SUMMARY
[facility] - Emergency Dept  Hospital Medicine  Discharge Summary      Patient Name: Kandi Gonzáles  MRN: [id number]  DIONISIO: [id number]  Patient Class: OP- Observation  Admission Date: 4/27/2024  Hospital Length of Stay: 0 days  Discharge Date and Time: 4/28/2024 11:19 AM  Attending Physician: Berlin Dotson MD   Discharging Provider: Berlin Dotson MD  Primary Care Provider: Lisette, Primary Doctor    Primary Care Team: Networked reference to record PCT     HPI:   28F with history of recent cholecystectomy presents due to episodes of syncope at home while using the toilet. Events associated with nausea and vomiting. Found to be bradycardic on arrival which normalized. Given IVF, pain meds, antiemetic. Reports has been taking norcos and ibuprofen at home. Labs overall unremarkable. CT abd/pelv has some small fluid collections which seem consistent with the postoperative state. Admitted to hospital medicine service for continued observation and syncope evaluation.    * No surgery found *      Hospital Course:   Admitted for syncopal event after recent cholecystectomy. CT abdomen/pelvis seemed to reflect her expected postoperative findings. Monitored on telemetry and found to have intermittent bradycardia however was asymptomatic from this. Orthostatic BP WNL. Carotid US WNL. Echocardiogram unremarkable. Her symptoms did not recur. Suspected etiology to be combination of post-operative state, prescription opiate use, and vasovagal as this occurred during using the bathroom. By time of discharge the patient was tolerating a regular diet with resolved admission symptoms. Patient seen and examined on day of discharge.    Physical exam on day of discharge:  General - Patient alert and oriented x3 in NAD  CV - Regular rate and rhythm   Resp - Lungs CTA Bilaterally, No increased WOB  GI - lap sites well-appearing, approp post-op pains  Extrem-  No cyanosis, clubbing, edema.   Skin -  No masses, rashes or lesions  noted on cursory skin exam.       Goals of Care Treatment Preferences:  Code Status: Full Code      Consults:   Consults (From admission, onward)          Status Ordering Provider     Inpatient consult to General surgery  Once        Provider:  Ganesh White MD    Completed JAMILA NINO new Assessment & Plan notes have been filed under this hospital service since the last note was generated.  Service: Hospital Medicine    Final Active Diagnoses:    Diagnosis Date Noted POA    PRINCIPAL PROBLEM:  Syncope [R55] 04/27/2024 Yes    S/P cholecystectomy [Z90.49] 04/27/2024 Not Applicable      Problems Resolved During this Admission:       Discharged Condition: good    Disposition: Home or Self Care    Follow Up:   Follow-up Information       No, Primary Doctor. Schedule an appointment as soon as possible for a visit in 1 week(s).               Nathen Truong MD. Go on 5/7/2024.    Specialties: General Surgery, Surgery  Contact information:  [address] [address]  [phone]                           Patient Instructions:      Diet Adult Regular     Notify your health care provider if you experience any of the following:  temperature >100.4     Notify your health care provider if you experience any of the following:  persistent nausea and vomiting or diarrhea     Notify your health care provider if you experience any of the following:  severe uncontrolled pain     Notify your health care provider if you experience any of the following:  redness, tenderness, or signs of infection (pain, swelling, redness, odor or green/yellow discharge around incision site)     Notify your health care provider if you experience any of the following:  difficulty breathing or increased cough     Notify your health care provider if you experience any of the following:  severe persistent headache     Notify your health care provider if you experience any of the following:  worsening rash     Notify  your health care provider if you experience any of the following:  persistent dizziness, light-headedness, or visual disturbances     Notify your health care provider if you experience any of the following:  increased confusion or weakness     Lifting restrictions   Order Comments: Observe prior restrictions as per surgeon     Echo   Standing Status: Future Standing Exp. Date: 04/28/25     Order Specific Question Answer Comments   Release to patient Immediate      Activity as tolerated       Significant Diagnostic Studies:     Lab Results   Component Value Date    WBC 7.35 04/27/2024    HGB 13.2 04/27/2024    HCT 39.8 04/27/2024    MCV 92 04/27/2024     04/27/2024       BMP  Lab Results   Component Value Date     04/27/2024    K 3.9 04/27/2024     04/27/2024    CO2 25 04/27/2024    BUN 17 04/27/2024    CREATININE 0.9 04/27/2024    CALCIUM 8.6 (L) 04/27/2024    ANIONGAP 6 (L) 04/27/2024    EGFRNORACEVR >60.0 04/27/2024       Echo    Result Date: 4/28/2024    Left Ventricle: The left ventricle is normal in size. Normal wall thickness. There is normal systolic function with a visually estimated ejection fraction of 65 - 70%. There is normal diastolic function.   Right Ventricle: Normal right ventricular cavity size. Wall thickness is normal. Systolic function is normal.   IVC/SVC: Normal venous pressure at 3 mmHg.     X-Ray Chest AP Portable    Addendum Date: 4/28/2024    I was requested to over-read this exam for an experienced radiologist who is not yet credentialed for this hospital due to our group recently covering this hospital. Frontal radiograph of the chest was obtained. No prior exams available for comparison. Findings: Cardiac wires and defibrillator pads overlie the chest. Cardiac silhouette is not enlarged.  No focal consolidation.  No sizable pleural effusion.  No pneumothorax. Impression: No acute cardiopulmonary finding identified on this single view. Electronically signed by: Naif  MD Dottie Date:    04/28/2024 Time:    05:15    Result Date: 4/28/2024  EXAMINATION: XR CHEST AP PORTABLE CLINICAL HISTORY: Syncope and collapse TECHNIQUE: Single frontal view of the chest was performed. COMPARISON: None FINDINGS: The lungs are clear with normal appearance of pulmonary vasculature. No pleural effusion. No evident pneumothorax. The cardiac silhouette is normal in size. The hilar and mediastinal contours are unremarkable. Bones are intact.     No acute abnormality. Electronically signed by: Nathen Lopez MD Date:    04/27/2024 Time:    07:12    US Carotid Bilateral    Result Date: 4/27/2024  EXAMINATION: US CAROTID BILATERAL CLINICAL HISTORY: syncope; TECHNIQUE: Grayscale, color and spectral Doppler analysis was performed. CMS Mandated Quality [id number] COMPARISON: None FINDINGS: Right: Peak systolic velocity in the CCA is 80.7 cm/sec, and peak systolic velocity within the .5 cm/sec, with ICA/CCA ratio of less than 2.  Maximum end diastolic velocities are within normal limits. ECA is patent. Left: Peak systolic velocity in the CCA is 118.1 cm/sec, and peak systolic velocity within the .8 cm/sec, with ICA/CCA ratio of less than 2.  Maximum end diastolic velocities are within normal limits.  ECA is patent. The vertebral arteries are patent, with normal waveforms, and normal antegrade flow bilaterally.     1. No findings of hemodynamically significant carotid arterial stenosis or vascular occlusion. 2. Patent vertebral arteries with antegrade flow bilaterally. Criteria for stenosis is based upon the Society of Radiologist in Ultrasound Consensus Conference, 2003 (Radiology, November 2003, 229, 340-346). This is in accordance with NASCET criteria. Electronically signed by: Neo Briseno Date:    04/27/2024 Time:    09:57    CT Abdomen Pelvis With IV Contrast NO Oral Contrast    Result Date: 4/27/2024  EXAMINATION: CT ABDOMEN PELVIS WITH IV CONTRAST CLINICAL HISTORY: Peritonitis or  perforation suspected; TECHNIQUE: Low dose axial images, sagittal and coronal reformations were obtained from the lung bases to the pubic symphysis following the IV administration of 100 mL of Omnipaque 350 COMPARISON: 09/25/2023. FINDINGS: History of cholecystectomy 04/25/2024 Abdomen: - Lower thorax:Trace free air identified subdiaphragmatic (series 3, image 49 presumably postoperative given focal nature. - Lung bases: No infiltrates and no nodules. - Liver: No focal mass. - Gallbladder: Cholecystectomy.  No focal collection at operative bed to suggest abscess. - Bile Ducts: Mild prominence of the extrahepatic and proximal common bile ducts yet no significant intrahepatic biliary ductal dilatation. - Spleen: Negative. - Kidneys: No mass or hydronephrosis. - Adrenals: Unremarkable. - Pancreas: No mass or peripancreatic fat stranding. - Retroperitoneum:  No significant adenopathy. - Vascular: No abdominal aortic aneurysm. Pelvis: No pelvic mass, or adenopathy.  There is free fluid within the pelvis.  This extends along the RIGHT pericolic gutter.  Clinical considerations include that of ruptured ovarian cysts, biliary leak, peritonitis although the appearance is nonspecific. Bowel/Mesentery: No evidence of bowel obstruction or inflammation.  The appendix is not visualized with suture material at that level presumably S/P previous appendectomy. Bones:  No acute osseous abnormality and no suspicious lytic or blastic lesion.     S/P cholecystectomy (recent) per history with mild prominence of extrahepatic and proximal intrahepatic ducts.  This is within expected limits of normal postoperative appearance although correlation advised to exclude early pathologic dilatation. Tiny focus of intraperitoneal air, focal nature, likely postoperative. Free fluid within the pelvis and RIGHT pericolic gutter questionable etiology/significance.  Correlation advised in this regard particularly in light of the recent surgical history.   No evidence for focal abscess. This report was flagged in Epic as abnormal. Electronically signed by: Nathen Lopez MD Date:    04/27/2024 Time:    06:37         Pending Diagnostic Studies:       None           Medications:  Reconciled Home Medications:      Medication List        CONTINUE taking these medications      buPROPion 150 MG TB24 tablet  Commonly known as: WELLBUTRIN XL  Take 150 mg by mouth once daily.     HYDROcodone-acetaminophen 5-325 mg per tablet  Commonly known as: NORCO  Take 1 tablet by mouth every 6 (six) hours as needed for Pain.     omeprazole 40 MG capsule  Commonly known as: PRILOSEC  Take 1 capsule (40 mg total) by mouth Daily.     ondansetron 4 MG Tbdl  Commonly known as: ZOFRAN-ODT  Take 8 mg by mouth every 12 (twelve) hours as needed.              Indwelling Lines/Drains at time of discharge:   Lines/Drains/Airways       None                   Time spent on the discharge of patient: 38 minutes         Berlin Dotson MD  Department of Hospital Medicine  [facility] - Emergency Dept

## 2024-04-28 NOTE — HOSPITAL COURSE
Admitted for syncopal event after recent cholecystectomy. CT abdomen/pelvis seemed to reflect her expected postoperative findings. Monitored on telemetry and found to have intermittent bradycardia however was asymptomatic from this. Orthostatic BP WNL. Carotid US WNL. Echocardiogram unremarkable. Her symptoms did not recur. Suspected etiology to be combination of post-operative state, prescription opiate use, and vasovagal as this occurred during using the bathroom. By time of discharge the patient was tolerating a regular diet with resolved admission symptoms. Patient seen and examined on day of discharge.    Physical exam on day of discharge:  General - Patient alert and oriented x3 in NAD  CV - Regular rate and rhythm   Resp - Lungs CTA Bilaterally, No increased WOB  GI - lap sites well-appearing, approp post-op pains  Extrem-  No cyanosis, clubbing, edema.   Skin -  No masses, rashes or lesions noted on cursory skin exam.

## 2024-04-28 NOTE — PLAN OF CARE
04/28/24 1100   Final Note   Assessment Type Final Discharge Note   Anticipated Discharge Disposition Home   What phone number can be called within the next 1-3 days to see how you are doing after discharge? [phone]   Post-Acute Status   Coverage [employer] Blue [employer]   Discharge Delays None known at this time     Patient cleared for discharge from case management standpoint.    Chart and discharge orders reviewed.  Patient discharged home with no further case management needs.

## 2024-04-29 ENCOUNTER — PATIENT OUTREACH (OUTPATIENT)
Dept: ADMINISTRATIVE | Facility: CLINIC | Age: 29
End: 2024-04-29
Payer: COMMERCIAL

## 2024-04-29 RX ORDER — IBUPROFEN 200 MG
600 TABLET ORAL EVERY 6 HOURS PRN
COMMUNITY

## 2024-04-29 RX ORDER — ACETAMINOPHEN 500 MG
1000 TABLET ORAL EVERY 6 HOURS PRN
COMMUNITY

## 2024-04-29 NOTE — PROGRESS NOTES
C3 nurse spoke with Kandi Gonzáles  for a TCC post hospital discharge follow up call. The patient reports does not have a scheduled HOSFU appointment. C3 nurse was unable to schedule HOSFU appointment for [facility]. Patient advised to contact their PCP to schedule a HOSPFU within 5-7 days.

## 2024-05-07 ENCOUNTER — OFFICE VISIT (OUTPATIENT)
Dept: SURGERY | Facility: CLINIC | Age: 29
End: 2024-05-07
Payer: COMMERCIAL

## 2024-05-07 VITALS — DIASTOLIC BLOOD PRESSURE: 73 MMHG | HEART RATE: 86 BPM | SYSTOLIC BLOOD PRESSURE: 110 MMHG | TEMPERATURE: 98 F

## 2024-05-07 DIAGNOSIS — Z98.890 POST-OPERATIVE STATE: Primary | ICD-10-CM

## 2024-05-07 PROCEDURE — 3074F SYST BP LT 130 MM HG: CPT | Mod: CPTII,S$GLB,, | Performed by: SURGERY

## 2024-05-07 PROCEDURE — 99024 POSTOP FOLLOW-UP VISIT: CPT | Mod: S$GLB,,, | Performed by: SURGERY

## 2024-05-07 PROCEDURE — 1159F MED LIST DOCD IN RCRD: CPT | Mod: CPTII,S$GLB,, | Performed by: SURGERY

## 2024-05-07 PROCEDURE — 99999 PR PBB SHADOW E&M-EST. PATIENT-LVL III: CPT | Mod: PBBFAC,,, | Performed by: SURGERY

## 2024-05-07 PROCEDURE — 3078F DIAST BP <80 MM HG: CPT | Mod: CPTII,S$GLB,, | Performed by: SURGERY

## 2024-05-07 PROCEDURE — 1160F RVW MEDS BY RX/DR IN RCRD: CPT | Mod: CPTII,S$GLB,, | Performed by: SURGERY

## 2024-05-07 NOTE — PROGRESS NOTES
POST-OP NOTE    PROCEDURE:  Laparoscopic cholecystectomy    COMPLAINTS: None.    EXAM: Incision well approximated. No drainage. No infection.      IMPRESSION:  Doing well now.  Had an episode of syncope 3 days following the procedure requiring readmission for observation.  She has tolerating a diet without difficulty.    PLAN: FU as needed.

## 2024-05-08 ENCOUNTER — TELEPHONE (OUTPATIENT)
Dept: SURGERY | Facility: CLINIC | Age: 29
End: 2024-05-08
Payer: COMMERCIAL

## 2024-05-08 NOTE — TELEPHONE ENCOUNTER
----- Message from Cristinoriayan Velez sent at 5/8/2024  3:50 PM CDT -----  Contact: self  Type: Sooner Appointment Request        Caller is requesting a sooner appointment. Caller declined first available appointment listed below. Caller will not accept being placed on the waitlist and is requesting a message be sent to doctor.        Name of Caller: Patient   Best Call Back Number: [phone]  Additional Information: I have a pt of Dr Truong she works for [employer] she seen him yesterday he said she could go back to work but he didn't give her a release form to go back to work she is currently at work now at the main campus and now her management states she needs it now or they wont let her work. Plz call and advise with the pt her next steps. PLZ PUT IT IN [employer] SO SHE CAN BRING IT TO EMPLOYEE HEALTH IT HAS TO SAY WITH RESTRICTIONS OR WITHOUT RESTRICTIONS FOR 1-2 WEEKS.  Thanks

## 2024-05-18 LAB
OHS QRS DURATION: 98 MS
OHS QTC CALCULATION: 380 MS

## 2024-10-25 ENCOUNTER — LAB VISIT (OUTPATIENT)
Dept: LAB | Facility: HOSPITAL | Age: 29
End: 2024-10-25
Attending: FAMILY MEDICINE
Payer: COMMERCIAL

## 2024-10-25 ENCOUNTER — OFFICE VISIT (OUTPATIENT)
Dept: OBSTETRICS AND GYNECOLOGY | Facility: CLINIC | Age: 29
End: 2024-10-25
Payer: COMMERCIAL

## 2024-10-25 VITALS
WEIGHT: 190.69 LBS | SYSTOLIC BLOOD PRESSURE: 112 MMHG | DIASTOLIC BLOOD PRESSURE: 70 MMHG | BODY MASS INDEX: 31.73 KG/M2

## 2024-10-25 DIAGNOSIS — Z3A.14 14 WEEKS GESTATION OF PREGNANCY: ICD-10-CM

## 2024-10-25 DIAGNOSIS — Z32.00 ENCOUNTER FOR CONFIRMATION OF PREGNANCY TEST RESULT WITH PHYSICAL EXAMINATION: ICD-10-CM

## 2024-10-25 DIAGNOSIS — Z32.00 ENCOUNTER FOR CONFIRMATION OF PREGNANCY TEST RESULT WITH PHYSICAL EXAMINATION: Primary | ICD-10-CM

## 2024-10-25 DIAGNOSIS — E66.811 OBESITY, CLASS I, BMI 30-34.9: ICD-10-CM

## 2024-10-25 LAB
ABO + RH BLD: NORMAL
ALBUMIN SERPL BCP-MCNC: 3.8 G/DL (ref 3.5–5.2)
ALP SERPL-CCNC: 40 U/L (ref 40–150)
ALT SERPL W/O P-5'-P-CCNC: 18 U/L (ref 10–44)
ANION GAP SERPL CALC-SCNC: 10 MMOL/L (ref 8–16)
AST SERPL-CCNC: 19 U/L (ref 10–40)
B-HCG UR QL: POSITIVE
BASOPHILS # BLD AUTO: 0.03 K/UL (ref 0–0.2)
BASOPHILS NFR BLD: 0.4 % (ref 0–1.9)
BILIRUB SERPL-MCNC: 0.6 MG/DL (ref 0.1–1)
BLD GP AB SCN CELLS X3 SERPL QL: NORMAL
BUN SERPL-MCNC: 11 MG/DL (ref 6–20)
CALCIUM SERPL-MCNC: 9.1 MG/DL (ref 8.7–10.5)
CHLORIDE SERPL-SCNC: 106 MMOL/L (ref 95–110)
CO2 SERPL-SCNC: 19 MMOL/L (ref 23–29)
CREAT SERPL-MCNC: 0.7 MG/DL (ref 0.5–1.4)
CREAT UR-MCNC: 309 MG/DL (ref 15–325)
CTP QC/QA: YES
DIFFERENTIAL METHOD BLD: ABNORMAL
EOSINOPHIL # BLD AUTO: 0.1 K/UL (ref 0–0.5)
EOSINOPHIL NFR BLD: 1.7 % (ref 0–8)
ERYTHROCYTE [DISTWIDTH] IN BLOOD BY AUTOMATED COUNT: 12.5 % (ref 11.5–14.5)
EST. GFR  (NO RACE VARIABLE): >60 ML/MIN/1.73 M^2
ESTIMATED AVG GLUCOSE: 94 MG/DL (ref 68–131)
GLUCOSE SERPL-MCNC: 175 MG/DL (ref 70–110)
HBA1C MFR BLD: 4.9 % (ref 4–5.6)
HBV SURFACE AG SERPL QL IA: NORMAL
HCT VFR BLD AUTO: 36.8 % (ref 37–48.5)
HCV AB SERPL QL IA: NORMAL
HGB BLD-MCNC: 12.8 G/DL (ref 12–16)
HIV 1+2 AB+HIV1 P24 AG SERPL QL IA: NORMAL
IMM GRANULOCYTES # BLD AUTO: 0.05 K/UL (ref 0–0.04)
IMM GRANULOCYTES NFR BLD AUTO: 0.6 % (ref 0–0.5)
LYMPHOCYTES # BLD AUTO: 1.2 K/UL (ref 1–4.8)
LYMPHOCYTES NFR BLD: 14.9 % (ref 18–48)
MCH RBC QN AUTO: 31.1 PG (ref 27–31)
MCHC RBC AUTO-ENTMCNC: 34.8 G/DL (ref 32–36)
MCV RBC AUTO: 89 FL (ref 82–98)
MONOCYTES # BLD AUTO: 0.5 K/UL (ref 0.3–1)
MONOCYTES NFR BLD: 5.8 % (ref 4–15)
NEUTROPHILS # BLD AUTO: 6.2 K/UL (ref 1.8–7.7)
NEUTROPHILS NFR BLD: 76.6 % (ref 38–73)
NRBC BLD-RTO: 0 /100 WBC
PLATELET # BLD AUTO: 212 K/UL (ref 150–450)
PMV BLD AUTO: 11.3 FL (ref 9.2–12.9)
POTASSIUM SERPL-SCNC: 3.5 MMOL/L (ref 3.5–5.1)
PROT SERPL-MCNC: 7 G/DL (ref 6–8.4)
PROT UR-MCNC: 21 MG/DL (ref 0–15)
PROT/CREAT UR: 0.07 MG/G{CREAT} (ref 0–0.2)
RBC # BLD AUTO: 4.12 M/UL (ref 4–5.4)
SODIUM SERPL-SCNC: 135 MMOL/L (ref 136–145)
TREPONEMA PALLIDUM IGG+IGM AB [PRESENCE] IN SERUM OR PLASMA BY IMMUNOASSAY: NONREACTIVE
WBC # BLD AUTO: 8.1 K/UL (ref 3.9–12.7)

## 2024-10-25 PROCEDURE — 36415 COLL VENOUS BLD VENIPUNCTURE: CPT | Mod: PO | Performed by: FAMILY MEDICINE

## 2024-10-25 PROCEDURE — 86901 BLOOD TYPING SEROLOGIC RH(D): CPT | Performed by: FAMILY MEDICINE

## 2024-10-25 PROCEDURE — 86762 RUBELLA ANTIBODY: CPT | Performed by: FAMILY MEDICINE

## 2024-10-25 PROCEDURE — 85025 COMPLETE CBC W/AUTO DIFF WBC: CPT | Performed by: FAMILY MEDICINE

## 2024-10-25 PROCEDURE — 83036 HEMOGLOBIN GLYCOSYLATED A1C: CPT | Performed by: FAMILY MEDICINE

## 2024-10-25 PROCEDURE — 86803 HEPATITIS C AB TEST: CPT | Performed by: FAMILY MEDICINE

## 2024-10-25 PROCEDURE — 84156 ASSAY OF PROTEIN URINE: CPT | Performed by: FAMILY MEDICINE

## 2024-10-25 PROCEDURE — 87340 HEPATITIS B SURFACE AG IA: CPT | Performed by: FAMILY MEDICINE

## 2024-10-25 PROCEDURE — 87086 URINE CULTURE/COLONY COUNT: CPT | Performed by: FAMILY MEDICINE

## 2024-10-25 PROCEDURE — 87389 HIV-1 AG W/HIV-1&-2 AB AG IA: CPT | Performed by: FAMILY MEDICINE

## 2024-10-25 PROCEDURE — 86850 RBC ANTIBODY SCREEN: CPT | Performed by: FAMILY MEDICINE

## 2024-10-25 PROCEDURE — 83020 HEMOGLOBIN ELECTROPHORESIS: CPT | Performed by: FAMILY MEDICINE

## 2024-10-25 PROCEDURE — 99999 PR PBB SHADOW E&M-EST. PATIENT-LVL III: CPT | Mod: PBBFAC,,, | Performed by: FAMILY MEDICINE

## 2024-10-25 PROCEDURE — 86787 VARICELLA-ZOSTER ANTIBODY: CPT | Performed by: FAMILY MEDICINE

## 2024-10-25 PROCEDURE — 80053 COMPREHEN METABOLIC PANEL: CPT | Performed by: FAMILY MEDICINE

## 2024-10-25 PROCEDURE — 86593 SYPHILIS TEST NON-TREP QUANT: CPT | Performed by: FAMILY MEDICINE

## 2024-10-25 NOTE — PROGRESS NOTES
Kandi Gonzáles  28 y.o.  No obstetric history on file. MRN  [id number] PATIENT   1995   FINAL EDC:   _2025__   by _24 US 6+6__    Baby: ___    SO Name: [place] ALLERGIES:    COVID Vaccine  NKDA      GBS: ___  Date: ___ OB PROBLEM LIST:    Obesity, BMI 31    [  ] baseline spot PCR and HbA1C    [  ] ASA 81mg if BMI >34    [  ] Targeted US if BMI >34    [  ] BPP weekly @ 37wks if BMI >34    [  ] BPP weekly @ 34wks if BMI >39    [  ] growth US @ 32wks if BMI >39    [  ] postpartum prophylactic LMWH if BMI >=40       TO-DO THROUGHOUT PREGNANCY:  Depression Screen: ___  Circumcision: ___  Rhogam: ___  Flu Shot: ___  TDAP: ___  Infant feeding: ___   Plans for epidural: ___  Classes at hospital: ___  PP contraception: ___  Delivery Consent: ___  TOLAC counseling: ___  BTL counseling: ___  Pediatrician: ___ MEDICATIONS:    PNV   TODAY'S PROGRESS NOTE    10/25/2024    OB INTAKE APPOINTMENT  Kandi Gonzáles is a 28 y.o. who presents today for her OB Intake Appointment.    She denies any problems so far.       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 Sperm donor not positive for those.   Hemoglobinopathies: Not previously tested     FAMILY LINEAGE AND HISTORY:  Ashkenazi or North American Bahai:  No  Intellectual disability:  No  Premature ovarian failure (<40yoa):  No  Cajun or Urdu Belgian:  No    MISCELLANEOUS:  Does the patient have cats? No    MEDICATIONS:  No current outpatient  "medications      --------------------------------------------------------------------------------     ASSESMENT & PLAN:  Pregnancy confirmed today with a positive pregnancy test.  Patient's medical history was obtained today.    A first trimester dating ultrasound was ordered and scheduled (ideally done between 8 and 10wks).  Gave patient our OB Welcome Packet and reviewed its contents.  Our discussion included:  an overview of appointments, hydration / nutrition / weight gain advice, safe OTC medications, what substances and exposures to avoid, vaccine recommendations, advice for morning sickness, dental hygiene advice, recommendations regarding exercise, advice for travel in pregnancy, information about breastfeeding, postpartum birth control, and circumcision, pediatricians in the community, WIC enrollment, how to contact us for concerns or problems during pregnancy, warning or danger signs.  Also provided [facility]'s publication, "Your Pregnancy from A-Z."    Advised patient to enroll in [employer] if not already done.    Medication management:.  Advised patient to start a prenatal vitamin if not already taking.  It should contain 600mcg folic acid, 27mg iron, and 200mg DHA.     Genetic and Carrier Screening options were discussed in detail with the patient.  Once her EDC is confirmed, she may go to [facility] for the test(s) if desired @ 9wks or greater.  She was encouraged to review the detailed information available in the OB Welcome Packet.    The patient was directed to the lab for  Routine OB labs    Records were requested from [facility].    PAP smear:  plan to collect later in pregnancy    SAB precautions reviewed    Timing of next clinic appointment will be determined by dating ultrasound.  Will send patient a message in [employer].    Pepper Stock NP     LABS   INITIAL LABS:    Use LNOB (for Epic auto-fill)  Use LLWOBLABS (for manual entry) OTHER LABS:    Use L28W (for Epic auto-fill)  Use " LLWOBLABS (for manual entry)   ULTRASOUNDS   ANATOMY SCAN:    Use LLWOANATOMYSCAN      HISTORY   MEDICAL HISTORY:    Pre-pregnancy BMI = 31  GERD SURGICAL HISTORY:    Cholecystectomy  Appendectomy  Ganglion cyst excision  Malone tooth extraction       OBSTETRIC HISTORY   Month/Year Mode of Delivery EGA Wt. M/F Epidural Complications / Comments   G1                                               SOCIAL HISTORY:    Smoker: non-smoker  Alcohol: denies  Drugs: denies  Relationship: same sex partner and IUI  Domestic Violence: no  Lives with: wife  Education Level: Some College  Occupation:  [employer]  Adventist: n/a GYN HISTORY:    PAP'S: no h/o abnormals  LAST PAP: PAP neg / Date: 11/22/2020  STD Hx: no past history  GENITAL HSV: no FAMILY HISTORY:    HTN: no  DIABETES: no  BLEEDING D/O: no  CLOTTING D/O: no  BIRTH DEFECTS: no  MENTAL DISABILITY: no (Autism in sibling)  GYN CANCER: no       Follow up in about 2 weeks (around 11/8/2024).   Future Appointments   Date Time Provider Department Center   10/25/2024  3:20 PM [facility] [place]   11/1/2024  8:00 AM ULTRASOUND, [facility] OBGYN [facility] [place] [facility]   11/7/2024 10:30 AM Kathy Raza MD [facility] OBGYN [place] [facility]

## 2024-10-28 LAB
HGB A2 MFR BLD HPLC: 2.9 % (ref 2.2–3.2)
HGB FRACT BLD ELPH-IMP: NORMAL
HGB FRACT BLD ELPH-IMP: NORMAL
RUBV IGG SER-ACNC: 57.6 IU/ML
RUBV IGG SER-IMP: REACTIVE
VARICELLA INTERPRETATION: POSITIVE
VARICELLA ZOSTER IGG: 616

## 2024-11-01 ENCOUNTER — HOSPITAL ENCOUNTER (OUTPATIENT)
Dept: OBSTETRICS AND GYNECOLOGY | Facility: CLINIC | Age: 29
Discharge: HOME | End: 2024-11-01
Payer: COMMERCIAL

## 2024-11-01 ENCOUNTER — TELEPHONE (OUTPATIENT)
Dept: OBSTETRICS AND GYNECOLOGY | Facility: CLINIC | Age: 29
End: 2024-11-01
Payer: COMMERCIAL

## 2024-11-01 DIAGNOSIS — Z32.00 ENCOUNTER FOR CONFIRMATION OF PREGNANCY TEST RESULT WITH PHYSICAL EXAMINATION: ICD-10-CM

## 2024-11-01 PROCEDURE — 76816 OB US FOLLOW-UP PER FETUS: CPT | Mod: 26,,, | Performed by: OBSTETRICS & GYNECOLOGY

## 2024-11-04 ENCOUNTER — PATIENT MESSAGE (OUTPATIENT)
Dept: OBSTETRICS AND GYNECOLOGY | Facility: CLINIC | Age: 29
End: 2024-11-04
Payer: COMMERCIAL

## 2024-11-07 ENCOUNTER — INITIAL PRENATAL (OUTPATIENT)
Dept: OBSTETRICS AND GYNECOLOGY | Facility: CLINIC | Age: 29
End: 2024-11-07
Payer: COMMERCIAL

## 2024-11-07 ENCOUNTER — LAB VISIT (OUTPATIENT)
Dept: LAB | Facility: HOSPITAL | Age: 29
End: 2024-11-07
Attending: GENERAL PRACTICE
Payer: COMMERCIAL

## 2024-11-07 VITALS
WEIGHT: 192.69 LBS | DIASTOLIC BLOOD PRESSURE: 70 MMHG | HEART RATE: 94 BPM | BODY MASS INDEX: 32.06 KG/M2 | SYSTOLIC BLOOD PRESSURE: 112 MMHG

## 2024-11-07 DIAGNOSIS — Z12.4 CERVICAL CANCER SCREENING: ICD-10-CM

## 2024-11-07 DIAGNOSIS — O09.90 SUPERVISION OF HIGH RISK PREGNANCY, ANTEPARTUM: ICD-10-CM

## 2024-11-07 DIAGNOSIS — O09.90 SUPERVISION OF HIGH RISK PREGNANCY, ANTEPARTUM: Primary | ICD-10-CM

## 2024-11-07 PROCEDURE — 0352U VAGINOSIS SCREEN BY DNA PROBE: CPT | Performed by: GENERAL PRACTICE

## 2024-11-07 PROCEDURE — 36415 COLL VENOUS BLD VENIPUNCTURE: CPT | Mod: PO | Performed by: GENERAL PRACTICE

## 2024-11-07 PROCEDURE — 99999 PR PBB SHADOW E&M-EST. PATIENT-LVL III: CPT | Mod: PBBFAC,,, | Performed by: GENERAL PRACTICE

## 2024-11-07 PROCEDURE — 88175 CYTOPATH C/V AUTO FLUID REDO: CPT | Performed by: GENERAL PRACTICE

## 2024-11-07 RX ORDER — ASCORBIC ACID 125 MG
TABLET,CHEWABLE ORAL
COMMUNITY

## 2024-11-07 RX ORDER — ASPIRIN 81 MG/1
81 TABLET ORAL DAILY
Qty: 90 TABLET | Refills: 3 | Status: SHIPPED | OUTPATIENT
Start: 2024-11-07 | End: 2025-11-07

## 2024-11-07 NOTE — PATIENT INSTRUCTIONS
To schedule classes (see below for what's offered) at the hospital, call [phone].    Have a question or concern?    PRO TIP: Program these phone numbers in your cell phone!    for an emergency  call 911 or go to the nearest hospital Especially after 20 weeks of the pregnancy, please remember that  Labor & Delivery is at [facility].  There is no L&D at [facility]  After hours you can only access L&D through the Emergency Room (entrance on [facility]).   [facility] Nurse Care Advice Line  [phone] At any time during your pregnancy,  you can speak to a nurse 24-7.   for non-urgent issues, send us a  message in [employer] Consider calling the Nurse Care Advice Line if it's a weekend or  toward the end of the work-day since  [employer] and phone messages may not be answered for a day or two.   for non-urgent issues, call the clinic  [phone], Option 3    Labor & Delivery  [phone] Starting at 20 weeks of the pregnancy,  you can speak to a nurse on L&D 24-7.     SECOND TRIMESTER  14+0 - 28+6 weeks    Adapting to Pregnancy: Second Trimester   Keep up the healthy habits you started in your first trimester.  It's not too late to make better choices and drop bad habits - your baby's counting on you!  Most women enjoy this middle part of the pregnancy: first trimester fatigue and morning sickness are probably behind you, and your belly's not yet getting in the way physically.    Your To-Do List  There are several things you should start working on.  More information on these topics can be found in your OB Welcome Packet and the A-Z Book.    Choose a pediatrician for your baby.  Sign up for a tour and classes at the hospital.  All classes are free of charge if you are delivering at [facility].  Call [phone] to register for any of these classes:  Baby Love (learn about the delivery process and caring for a )  Big Brother / Big Sister Class (to help siblings prepare for  baby)  Lamaze (a 4 week class to learn about natural interventions for labor)  Breastfeeding (get a head start learning about breastfeeding)  Work on some methods for coping with the pains of labor.  A good bit of labor happens before you can get an epidural, and you'll feel more confident if you have a plan that you've practiced.  We encourage everyone to breastfeed if they can (and most women can!).  Please ask us questions if you have them.  Decide what you'd like to use for contraception (birth control) after this baby is born.  If you're having a boy, let us know if you plan to have him circumcised.    Pregnancy Milestones  After week 16, avoid lying on your back for more than a few minutes. Instead, lie on your side and switch sides often.  Between 19 and 22 weeks you'll have an ultrasound to look at the baby's anatomy and determine the gender (if you want to know and don't already know). This is around the same time when you should start feeling baby's movements and kicks.  Your gestational diabetes test will be done around 28 weeks.  We'll give you a TDAP booster shot so that your baby is protected from Whooping Cough (pertussis) his/her first few months of life.    When You Travel   The second trimester is a good time for travel. Talk to your health care provider about any special plans you may need to make. Always:   Wear a seat belt. Fasten the lap part under your belly. Wear the shoulder part also.   Take frequent breaks during long trips by car or plane. Move around to stretch your legs.   Drink plenty of fluids on flights. The air in plane cabins is very dry.   Avoid hot climates or high altitudes if you are not used to them.   Avoid places where the food and water might make you sick.    Intimacy  Unless your health care provider tells you otherwise, it's perfectly fine to continue having sex. In fact, many women find sex in the second trimester quite enjoyable due to increased blood supply to the  pelvic area.    Baby!  Organs continue to develop and begin to function, there's formation of eyebrows / eyelashes / fingernails, skin is wrinkled and covered in vernix, genitals develop, a fine hair called lanugo covers the body, and baby is busy: kicking, sleeping, swallowing amniotic fluid, listening to you, passing urine, and sucking those thumbs.    OTHER INFORMATION:  If your provider orders labs or other studies, you probably won't hear from us unless something is abnormal.  How we define normal is different for many things in pregnancy.  This includes several of the numbers on a Complete Blood Count (CBC).  If your result is flagged as abnormal in [employer] but you don't hear from us, it's probably because things are actually normal based on where you are in your pregnancy.

## 2024-11-07 NOTE — PROGRESS NOTES
Kandi Gonzáles  28 y.o.  No obstetric history on file. MRN  [id number] PATIENT   1995   FINAL EDC:   2025  by IUI date 8/3/24    Baby: ___    Spouse Name: [place] ALLERGIES:    COVID Vaccine  NKDA      GBS: ___  Date: ___ OB PROBLEM LIST:    Rh negative    [  ] Rhogam @ 28wks and prn    Obesity, BMI 31, normal baseline PCR & HbA1C       TO-DO THROUGHOUT PREGNANCY:  Depression Screen: ___  Circumcision: ___  Rhogam: ___ (knows donor is Rh Neg)  Flu Shot: OCT 2024  TDAP: ___  Infant feeding: ___   Plans for epidural: ___  Classes at hospital: ___  PP contraception: ___  Delivery Consent: ___  Pediatrician: ___ MEDICATIONS:    PNV  Melatonin prn  ASA 81mg (start @ 15+5wks)   TODAY'S PROGRESS NOTE    2024    NADEEM Chau is a 28 y.o.  at 15w5d who presents for first OB appointment.  She denies VB, LOF, CTX's.  She reports no FM yet.    O/  Vitals:    24 1354   BP: 112/70   Pulse: 94     FH: cwd  DT'S: 150bpm by doppler    GEN = alert/oriented, nad  HEENT = sclera anicteric, EOM grossly normal, no thyromegaly or nodules palpated  BREASTS = deferred, no concerns  CV = rrr, no murmur appreciated  PULM = cta bilat  ABD = soft, nontender, nondistended   =     External: nefg, no lesions     Vagina: normal and without lesions and urethral meatus normal     Discharge: moderate, frothy, and vaginitis swab obtained     Cervix: normal-appearing and PAP smear obtained     Bimanual: uterus enlarged (16wks) and nontender and no adnexal masses or tenderness    See remainder of flowsheet for labs/rads.    A/P  Unknown wks for first OB appointment.    OB Problem List and Plan continued here on flowsheet  Discussed recommendation for flu shot.  Patient already had.  Aspirin Questionnaire reviewed.  The patient is a candidate for ASA 81mg daily for prevention of pre-eclampsia  based on G1 and BMI.  Advised to start now and continue till delivery.  Discussed risk reduction for preeclampsia is 24%, for   birth is 14%, and for IUGR is 20%.  There have been no documented harms from low-dose aspirin therapy in pregnancy (regarding maternal or  bleeding complications and childhood problems in children who were exposed to aspirin in utero).  Sleep Apnea screening completed, and the patient is considered low-risk.  Reviewed Carrier and Aneuploidy Screening with the patient.   Carrier Screen already done but [id number] was mistakenly not ordered.  Re-ordered today and patient directed to lab.  PAP smear: collected today; CT/NG testing: recently done and negative (done with [facility])  SAB precautions reviewed  Anatomy US ordered  RTC 1-2 days after US, sooner prolive Frazier MD       LABS   INITIAL LABS:  @ 14wks  Lab Results   Component Value Date    GROUPTRH B NEG 10/25/2024    INDIRECTCOOM NEG 10/25/2024      Lab Results   Component Value Date    WBC 8.10 10/25/2024    HGB 12.8 10/25/2024    HCT 36.8 (L) 10/25/2024     10/25/2024    MCV 89 10/25/2024      Lab Results   Component Value Date    HEPBSAG Non-reactive 10/25/2024    HEPCAB Non-reactive 10/25/2024    TREPABIGMIGG Nonreactive 10/25/2024    NYN16VKQK Non-reactive 10/25/2024    RUBELLAIMMUN Reactive 10/25/2024     Lab Results   Component Value Date    VARICELLAZOS 616.00 10/25/2024    VARICELLAINT Positive 10/25/2024     Lab Results   Component Value Date    HGBELECTINTE Normal 10/25/2024     Lab Results   Component Value Date    LABURIN No significant growth 10/25/2024     Lab Results   Component Value Date    CREATININE 0.7 10/25/2024    AST 19 10/25/2024    ALT 18 10/25/2024    BILITOT 0.6 10/25/2024    UTPCR 0.07 10/25/2024      Lab Results   Component Value Date    HGBA1C 4.9 10/25/2024     CT/NG recently neg with [facility] OTHER LABS:       ULTRASOUNDS   ANATOMY SCAN:        HISTORY   MEDICAL HISTORY:    Pre-pregnancy BMI = 31  GERD SURGICAL HISTORY:    Cholecystectomy  Appendectomy  Ganglion cyst excision  Reese tooth  extraction       OBSTETRIC HISTORY   Month/Year Mode of Delivery EGA Wt. M/F Epidural Complications / Comments   G1                    SOCIAL HISTORY:    Smoker: non-smoker  Alcohol: denies  Drugs: denies  Relationship: same sex partner and IUI  Domestic Violence: no  Lives with: wife /  SEP 2022  Education Level: Some College  Occupation: [employer]  Restoration: n/a GYN HISTORY:    PAP'S: no h/o abnormals  LAST PAP: PAP neg / Date: 11/22/2020  STD Hx: no past history  GENITAL HSV: no FAMILY HISTORY:    HTN: no  DIABETES: no  BLEEDING D/O: no  CLOTTING D/O: no  BIRTH DEFECTS: no  MENTAL DISABILITY: no (Autism in sibling)  GYN CANCER: no

## 2024-11-08 LAB
CLINICAL INFO: NORMAL
DATE OF PREVIOUS PAP: NORMAL
DATE PREVIOUS BX: NO
LMP START DATE: NORMAL
SPECIMEN SOURCE CVX/VAG CYTO: NORMAL

## 2024-11-09 ENCOUNTER — PATIENT MESSAGE (OUTPATIENT)
Dept: OTHER | Facility: OTHER | Age: 29
End: 2024-11-09
Payer: COMMERCIAL

## 2024-11-12 DIAGNOSIS — B96.89 BACTERIAL VAGINITIS: Primary | ICD-10-CM

## 2024-11-12 DIAGNOSIS — N76.0 BACTERIAL VAGINITIS: Primary | ICD-10-CM

## 2024-11-12 RX ORDER — METRONIDAZOLE 500 MG/1
500 TABLET ORAL EVERY 12 HOURS
Qty: 14 TABLET | Refills: 0 | Status: SHIPPED | OUTPATIENT
Start: 2024-11-12 | End: 2024-11-19

## 2024-11-14 LAB
ABOUT THE TEST: NORMAL
APPROVED BY: NORMAL
FETAL FRACTION: NORMAL
FETAL SEX RESULT: NORMAL
GESTATIONAL AGE > 9: YES
GESTATIONAL AGE: NORMAL
LAB DIRECTOR COMMENTS: NORMAL
LIMITATIONS:: NORMAL
NEGATIVE PREDICTIVE VALUE: NORMAL
NOTE: NORMAL
PERFORMANCE CHARACTERISTICS: NORMAL
PERFORMANCE: NORMAL
POSITIVE PREDICTIVE VALUE: NORMAL
RESULT: NEGATIVE
SERVICE CMNT 04-IMP: NORMAL
TEST METHODOLOGY:: NORMAL
TRISOMY 13 (T13): NEGATIVE
TRISOMY 18: NEGATIVE
TRISOMY 21 (T21): NEGATIVE

## 2024-11-14 NOTE — PROGRESS NOTES
Kandi,    Good news that the genetic test that screens to see if your baby is at increased risk for certain trisomies and abnormal numbers of the sex chromosomes was NEGATIVE.  No further testing is recommended.    If you'd like to know your baby's gender:  1) You can read your MaterniT 21 test report in [employer].    2) We can leave the information in an envelope for you to  downstairs here at the clinic.    If you do NOT want to know your baby's gender, do NOT read the MaterniT 21 test report in [employer].    Sincerely,  Dr. Frazier

## 2024-11-16 ENCOUNTER — PATIENT MESSAGE (OUTPATIENT)
Dept: OTHER | Facility: OTHER | Age: 29
End: 2024-11-16
Payer: COMMERCIAL

## 2024-11-18 ENCOUNTER — PATIENT MESSAGE (OUTPATIENT)
Dept: OBSTETRICS AND GYNECOLOGY | Facility: CLINIC | Age: 29
End: 2024-11-18
Payer: COMMERCIAL

## 2024-11-21 ENCOUNTER — OFFICE VISIT (OUTPATIENT)
Dept: FAMILY MEDICINE | Facility: CLINIC | Age: 29
End: 2024-11-21
Payer: COMMERCIAL

## 2024-11-21 VITALS
HEART RATE: 97 BPM | OXYGEN SATURATION: 98 % | HEIGHT: 66 IN | TEMPERATURE: 98 F | SYSTOLIC BLOOD PRESSURE: 110 MMHG | WEIGHT: 196.5 LBS | BODY MASS INDEX: 31.58 KG/M2 | DIASTOLIC BLOOD PRESSURE: 78 MMHG

## 2024-11-21 DIAGNOSIS — Z76.89 ENCOUNTER TO ESTABLISH CARE: Primary | ICD-10-CM

## 2024-11-21 DIAGNOSIS — Z3A.17 17 WEEKS GESTATION OF PREGNANCY: ICD-10-CM

## 2024-11-21 PROCEDURE — 99999 PR PBB SHADOW E&M-EST. PATIENT-LVL III: CPT | Mod: PBBFAC,,, | Performed by: NURSE PRACTITIONER

## 2024-11-21 NOTE — PROGRESS NOTES
SUBJECTIVE:      Patient ID: Kandi Gonzáles is a 29 y.o. female.    Chief Complaint: Establish Care    History of Present Illness    CHIEF COMPLAINT:  Ms. Gonzáles presents for provider signature on a form confirming smoking cessation, required for removal from the tobacco plan with her employer's health insurance.    HPI:  Ms. Gonzáles reports smoking cessation in 2023, with previous occasional use. She is 17 weeks and 5 days pregnant, with her 18th week beginning on Saturday. Conception occurred through IUI in an unmedicated cycle on the first attempt, initiated in . Ms. Gonzáles denies any pregnancy complications or issues. She has heartburn when fluid intake is inadequate or when fasting, causing occasional coughing. The pregnancy has been uncomplicated overall, with no morning sickness. She is currently under OB care for pregnancy management.    MEDICATIONS:  Ms. Gonzáles is on prenatal vitamins and baby aspirin. She occasionally uses melatonin.    MEDICAL HISTORY:  Ms. Gonzáles has a history of tobacco use, which she quit in 2023 after previously using occasionally. She experienced a reaction to the COVID-19 Moderna vaccine, resulting in hives and arm swelling. Ms. Gonzáles is currently pregnant. She is , sexually active, and has no children. Ms. Gonzáles has received one dose of the COVID-19 Moderna vaccine.    SURGICAL HISTORY:  Ms. Gonzáles underwent an appendectomy and has also had a cholecystectomy.    ALLERGIES:  Ms. Gonzáles experienced hives and arm swelling in reaction to the COVID-19 Moderna vaccine.    SOCIAL HISTORY:  Ms. Gonzáles quit smoking in 2023, having been an occasional smoker previously. She occasionally vapes but has reduced use due to pregnancy. She does not consume alcohol, even before pregnancy. 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        Review of Systems   Constitutional:  Negative for activity change, appetite change, chills, diaphoresis, fatigue, fever and unexpected  weight change.   HENT:  Negative for congestion, ear pain, sinus pressure, sore throat, trouble swallowing and voice change.    Eyes:  Negative for pain, discharge and visual disturbance.   Respiratory:  Negative for cough, chest tightness, shortness of breath and wheezing.    Cardiovascular:  Negative for chest pain and palpitations.   Gastrointestinal:  Negative for abdominal pain, constipation, diarrhea, nausea and vomiting.   Genitourinary:  Negative for difficulty urinating, flank pain, frequency and urgency.        17 weeks pregnant   Musculoskeletal:  Negative for back pain and joint swelling.   Skin:  Negative for color change and rash.   Neurological:  Negative for dizziness, seizures, syncope, weakness, numbness and headaches.   Hematological:  Negative for adenopathy.   Psychiatric/Behavioral:  Negative for dysphoric mood and sleep disturbance. The patient is not nervous/anxious.        Family History   Problem Relation Name Age of Onset    Colon cancer Neg Hx      Esophageal cancer Neg Hx      Liver cancer Neg Hx        Social History     Socioeconomic History    Marital status:    Tobacco Use    Smoking status: Former     Types: Vaping with nicotine     Passive exposure: Never    Smokeless tobacco: Never     Social Drivers of Health     Financial Resource Strain: Low Risk  (11/19/2024)    Overall Financial Resource Strain (CARDIA)     Difficulty of Paying Living Expenses: Not hard at all   Food Insecurity: No Food Insecurity (11/19/2024)    Hunger Vital Sign     Worried About Running Out of Food in the Last Year: Never true     Ran Out of Food in the Last Year: Never true   Physical Activity: Inactive (11/19/2024)    Exercise Vital Sign     Days of Exercise per Week: 0 days     Minutes of Exercise per Session: 0 min   Stress: No Stress Concern Present (11/19/2024)    [employer] - Occupational Stress Questionnaire     Feeling of Stress : Not at all   Housing  "Stability: Unknown (11/19/2024)    Housing Stability Vital Sign     Unable to Pay for Housing in the Last Year: No     Current Outpatient Medications   Medication Sig Dispense Refill    aspirin (ECOTRIN) 81 MG EC tablet Take 1 tablet (81 mg total) by mouth once daily. 90 tablet 3    melatonin 5 mg Chew Take by mouth.      PNV 29-IRON-FOLIC-DHA-FISH OIL ORAL Take by mouth.       No current facility-administered medications for this visit.     Review of patient's allergies indicates:   Allergen Reactions    Covid-19 vaccine, [id number], lnp-s (moderna) Rash      Past Medical History:   Diagnosis Date    GERD (gastroesophageal reflux disease)      Past Surgical History:   Procedure Laterality Date    APPENDECTOMY      [id number]    GANGLION CYST EXCISION      LAPAROSCOPIC CHOLECYSTECTOMY N/A 4/25/2024    Procedure: CHOLECYSTECTOMY, LAPAROSCOPIC;  Surgeon: Nathen Truong MD;  [facility]: [facility];  Service: General;  Laterality: N/A;    WISDOM TOOTH EXTRACTION            OBJECTIVE:      Vitals:    11/21/24 0841   BP: 110/78   BP Location: Left arm   Patient Position: Sitting   Pulse: 97   Temp: 98.3 °F (36.8 °C)   TempSrc: Oral   SpO2: 98%   Weight: 89.1 kg (196 lb 8 oz)   Height: 5' 6" (1.676 m)     Physical Exam  Vitals and nursing note reviewed.   Constitutional:       General: She is awake. She is not in acute distress.     Appearance: She is well-developed and well-groomed. She is obese. She is not ill-appearing, toxic-appearing or diaphoretic.   HENT:      Head: Normocephalic and atraumatic.      Right Ear: Tympanic membrane, ear canal and external ear normal.      Left Ear: Tympanic membrane, ear canal and external ear normal.      Nose: Nose normal.   Eyes:      General: Lids are normal. Gaze aligned appropriately.      Conjunctiva/sclera: Conjunctivae normal.      Right eye: Right conjunctiva is not injected.      Left eye: Left conjunctiva is not injected.      Pupils: Pupils are equal, round, and reactive to " light.   Cardiovascular:      Rate and Rhythm: Normal rate and regular rhythm.      Pulses: Normal pulses.      Heart sounds: Normal heart sounds, S1 normal and S2 normal. No murmur heard.     No friction rub. No gallop.   Pulmonary:      Effort: Pulmonary effort is normal. No respiratory distress.      Breath sounds: Normal breath sounds. No stridor. No decreased breath sounds, wheezing, rhonchi or rales.   Chest:      Chest wall: No tenderness.   Musculoskeletal:      Cervical back: Neck supple.      Right lower leg: No edema.      Left lower leg: No edema.   Lymphadenopathy:      Cervical: No cervical adenopathy.   Skin:     General: Skin is warm and dry.      Capillary Refill: Capillary refill takes less than 2 seconds.      Findings: No erythema or rash.   Neurological:      Mental Status: She is alert and oriented to person, place, and time. Mental status is at baseline.   Psychiatric:         Attention and Perception: Attention normal.         Mood and Affect: Mood normal.         Speech: Speech normal.         Behavior: Behavior normal. Behavior is cooperative.         Thought Content: Thought content normal.         Judgment: Judgment normal.       Lab Visit on 11/07/2024   Component Date Value Ref Range Status    Gestational Age 11/07/2024 Kumari   Final    Fetal Fraction 11/07/2024 19%   Final    GESTATIONAL AGE > 9 11/07/2024 Yes   Final    Result 11/07/2024 Negative   Final     Comments 11/07/2024 Comment   Final    Comment: This specimen showed an expected representation of  chromosome 21, 18 and 13 material. Clinical correlation is  suggested.      Approved By 11/07/2024 Comment   Final    Zhang Thrasher MD, PhD, Director, [facility]    Trisomy 21 (T21) 11/07/2024 Negative   Final    Trisomy 18 11/07/2024 Negative   Final    Trisomy 13 (T13) 11/07/2024 Negative   Final    FETAL SEX RESULT 11/07/2024 Comment   Final    Consistent with Female    Negative Predictive Value  11/07/2024 Note   Final    Comment: The Negative Predictive Value (NPV) for trisomy 21, 18, and  13 is greater than 99%. The NPV for SCA and ESS cannot be  calculated as SCA and ESS are only reported when an  abnormality is detected.      Positive Predictive Value 11/07/2024 N/A   Final    About the test 11/07/2024 Comment   Final    Comment: The MaterniT(R) 21 PLUS laboratory-developed test (LDT)  analyzes circulating cell-free DNA from a maternal blood  sample. This test is used for screening purposes and not  diagnostic. Clinical correlation is recommended. Validation  data on twin pregnancies is limited and the ability of this  test to detect aneuploidy in higher multiple gestations has  not yet been validated.      Test Methodology: 11/07/2024 Comment   Final    Comment: See Notes  Circulating cell-free DNA was purified from the plasma  component of maternal blood. The extracted DNA was then  converted into a genomic DNA library for aneuploidy  analysis of chromosomes 21, 18, and 13 via next generation  sequencing.[1] Optional findings based on the test order  include sex chromosome aneuploidy (SCA)[2], and enhanced  sequencing series (ESS)[3], which will only be reported on  as an additional finding when an abnormality is detected.  SCA testing includes information on X and Y representation,  while ESS testing includes deletions in selected regions  (22q, 15q, 11q, 8q, 5p, 4p, 1p) and trisomy of chromosomes  16 and 22.      Performance 11/07/2024 Comment   Final    Comment: The performance characteristics of the MaterniT(R) 21 PLUS  laboratory-developed test (LDT) have been determined in a  clinical validation study with pregnant women at increased  risk for fetal chromosomal aneuploidy.[1-4]      Performance Characteristics 11/07/2024 Note   Final    Comment: -----------------------------------------------------------  ! Fetal Sex                      ! Accuracy: 99.4%         !  !---------------------------------------------------------!  ! Region (associated syndrome)   ! Est. Sens# ! Est. Spec !  !---------------------------------------------------------!  ! Trisomy 21 (Down Syndrome)     ! 99.1%      ! 99.9%     !  !---------------------------------------------------------!  ! Trisomy 18 (Camp Syndrome)  ! >99.9%     ! 99.6%     !  !---------------------------------------------------------!  ! Trisomy 13 (Patau Syndrome)    ! 91.7%      ! 99.7%     !  !---------------------------------------------------------!  ! Sex Chromosome Aneuploidies##  ! 96.2%      ! 99.7%     !  !---------------------------------------------------------!  * As reported in [facility] database nstd37  [https://www.ncbi.nlm.nih.gov/dbvar/studies/nstd37/ ]  # Estimated Sensitivity. Sensitivity estimated across the  observed size distribution of each syndrome [per ISCA                             database nstd37] and across the range of fetal fractions  observed in routine clinical NIPT. Actual sensitivity can  also be influenced by other factors such as the size of the  event, total sequence counts, amplification bias, or  sequence bias.  ## Kumari gestation only.      Limitations: 11/07/2024 Comment   Final    Comment: While the results of these tests are highly reliable,  discordant results, including inaccurate fetal sex  prediction, may occur due to placental, maternal, or fetal  mosaicism or neoplasm; vanishing twin; prior maternal organ  transplant; or other causes. These tests are screening  tests and not diagnostic; they do not replace the accuracy  and precision of prenatal diagnosis with CVS or  amniocentesis. A patient with a positive test result should  be referred for genetic counseling and offered invasive  prenatal diagnosis for confirmation of test results.[5] The  results of this testing, including the benefits and  limitations, should be discussed with a qualified  healthcare provider. Pregnancy  management decisions,  including termination of the pregnancy, should not be based  on the results of these tests alone. The healthcare  provider is responsible for the use of this information in  the management of their patient.  Sex chromosomal  aneuploidies are not reportable for known mul                           tiple  gestations. A negative result does not ensure an unaffected  pregnancy nor does it exclude the possibility of other  chromosomal abnormalities or birth defects which are not a  part of these tests. An uninformative result may be  reported, the causes of which may include, but are not  limited to, insufficient sequencing coverage, noise or  artifacts in the region, amplification or sequencing bias,  or insufficient fetal fraction. These tests are not  intended to identify pregnancies at risk for neural tube  defects or ventral wall defects. Testing for whole  chromosome abnormalities (including sex chromosomes) and  for subchromosomal abnormalities could lead to the  potential discovery of both fetal and maternal genomic  abnormalities that could have major, minor, or no, clinical  significance. Evaluating the significance of a positive or  a non-reportable result may involve both invasive testing  and additional studies on the mother. Such investigations  may lead to a diagnosis                            of maternal chromosomal or  subchromosomal abnormalities, which on occasion may be  associated with benign or malignant maternal neoplasms.  These tests may not accurately identify fetal triploidy,  balanced rearrangements, or the precise location of  subchromosomal duplications or deletions; these may be  detected by prenatal diagnosis with CVS or amniocentesis.  The ability to report results may be impacted by maternal  BMI, maternal weight, maternal systemic lupus erythematosus  (SLE) and/or by certain pharmaceutical agents such as low  molecular weight heparin (for example:  Lovenox(R),  Xaparin(R), Clexane(R) and Fragmin(R)).      Note: 11/07/2024 Comment   Final    Comment: See Notes  [facility] is a subsidiary of [employer], using the brand [employer]. This test was  developed and its performance characteristics determined by  [employer]. It has not been cleared or approved by the Food  and Drug Administration. This laboratory is certified under  the Clinical Laboratory Improvement Amendments (CLIA) as  qualified to perform high complexity clinical laboratory  testing and accredited by the College of American  Pathologists (CAP).  If there is future clinical need for adding MaterniT GENOME  testing, this specimen will be available until term.  [place] samples will not be retained beyond 60 days.  [facility] patients will have to send a new sample for  re-sequencing ([facility] Test Code: 768141).      Service comment 04 11/07/2024 Comment   Final    Comment: 1. Yolanda MCCRARY, et al. Daniela Med. 2012;14(3):296-305.  2. Kvng GOMEZ, et al. Prenat Diag. 2013;33(6):591-597.  3. Alfonso C, et al. Clin Chem. 2015 Apr;61(4):608-616.  4. Yolanda MCCRARY, et al. Daniela Med. 2011;13(11):913-920.  5. ACOG/SMFM Practice Bulletin No. 226, Oct 2020.  Performed at:  [facility]  [address] [place] [address]  [id number]  : Zhang Alberts, Phone:  [phone]     Initial Prenatal on 11/07/2024   Component Date Value Ref Range Status    Bacterial vaginosis DNA 11/07/2024 Detected (A)  Not Detected Final    Candida sp 11/07/2024 Not Detected  Negative Final    Comment: Shayla species group includes: Candida albicans, Candida tropicalis,  Candida parapsiolosis, Shayla dubliniensis      Shayla glabrata/krusei 11/07/2024 Not Detected  Not Detected Final    Trichomonas vaginalis 11/07/2024 Not Detected  Not Detected Final    Cytology ThinPrep Pap Source 11/07/2024 Cervix   Final    Cytology ThinPrep Pap Report Status 11/07/2024  DNR   Final    Cytology Thinprep PAP Clinical His* 11/07/2024 Routine exam   Corrected    Cytology ThinPrep Pap LMP 11/07/2024 07/19/2024   Final    Cytology ThinPrep Previous PAP 11/07/2024 Unknown   Final    Cytology ThinPrep Previous Biopsy 11/07/2024 No   Final    Cytology ThinPrep PAP Adequacy 11/07/2024 SEE BELOW   Final    Comment: Satisfactory for evaluation. Endocervical/transformation zone   component   present.      Cytology ThinPrep PAP General Marian* 11/07/2024 DNR   Final    Cytology ThinPrep PAP Interpretati* 11/07/2024 SEE BELOW   Final    Cytology Results: Negative for intraepithelial lesion or malignancy.    Cytology ThinPrep PAP Comment 11/07/2024 SEE BELOW   Final    This Pap test has been evaluated with computer assisted technology.    Cytotechnologist 11/07/2024 SEE BELOW   Final    Comment: TAY CT(ASCP) CT screening location: [facility] [place] [address] [address] CLIA ID NUMBER [id number]   Slide   Preparation at [facility] [place] [address]   and CLIA No.:[id number]      Review Cytotechnologist 11/07/2024 DNR   Final    Pathologist 11/07/2024 DNR   Final    Cytology ThinPrep PAP Infection 11/07/2024 DNR   Final    Cytology Thin Prep Pap Explanation 11/07/2024 SEE BELOW   Final    Comment: EXPLANATORY NOTE:     The Pap is a screening test for cervical cancer. It is   not a diagnostic test and is subject to false negative   and false positive results. It is most reliable when a   satisfactory sample, regularly obtained, is submitted   with relevant clinical findings and history, and when   the Pap result is evaluated along with historic and   current clinical information.    TEST PERFORMED AT:  [facility] [address] [address]  SHIRIN MAURO M.D.     Lab Visit on 10/25/2024   Component Date Value Ref Range Status    Hgb A2 Quant 10/25/2024 2.9  2.2 - 3.2 % Final    Hemoglobin Bands 10/25/2024  Hb A and Hb A2   Final    Hemoglobin Electrophoresis Interp 10/25/2024 Normal   Final    Interpreted by Kandi Lvein M.D.    Varicella Zoster IgG 10/25/2024 616.00   Final    Varicella Interpretation 10/25/2024 Positive   Final    Comment: The assay performance in detecting antibodies to VZV   in individuals vaccinated with the FDA-licensed VZV  vaccine is unknown.  Presence of detectable VZV IgG antibodies. A positive   result generally indicates exposure to the pathogen   or administration of specific immunoglobulins, but   it is no indication of active infection or stage   of disease.      WBC 10/25/2024 8.10  3.90 - 12.70 K/uL Final    RBC 10/25/2024 4.12  4.00 - 5.40 M/uL Final    Hemoglobin 10/25/2024 12.8  12.0 - 16.0 g/dL Final    Hematocrit 10/25/2024 36.8 (L)  37.0 - 48.5 % Final    MCV 10/25/2024 89  82 - 98 fL Final    MCH 10/25/2024 31.1 (H)  27.0 - 31.0 pg Final    MCHC 10/25/2024 34.8  32.0 - 36.0 g/dL Final    RDW 10/25/2024 12.5  11.5 - 14.5 % Final    Platelets 10/25/2024 212  150 - 450 K/uL Final    MPV 10/25/2024 11.3  9.2 - 12.9 fL Final    Immature Granulocytes 10/25/2024 0.6 (H)  0.0 - 0.5 % Final    Gran # (ANC) 10/25/2024 6.2  1.8 - 7.7 K/uL Final    Immature Grans (Abs) 10/25/2024 0.05 (H)  0.00 - 0.04 K/uL Final    Comment: Mild elevation in immature granulocytes is non specific and   can be seen in a variety of conditions including stress response,   acute inflammation, trauma and pregnancy. Correlation with other   laboratory and clinical findings is essential.      Lymph # 10/25/2024 1.2  1.0 - 4.8 K/uL Final    Mono # 10/25/2024 0.5  0.3 - 1.0 K/uL Final    Eos # 10/25/2024 0.1  0.0 - 0.5 K/uL Final    Baso # 10/25/2024 0.03  0.00 - 0.20 K/uL Final    nRBC 10/25/2024 0  0 /100 WBC Final    Gran % 10/25/2024 76.6 (H)  38.0 - 73.0 % Final    Lymph % 10/25/2024 14.9 (L)  18.0 - 48.0 % Final    Mono % 10/25/2024 5.8  4.0 - 15.0 % Final    Eosinophil % 10/25/2024 1.7  0.0 - 8.0 %  Final    Basophil % 10/25/2024 0.4  0.0 - 1.9 % Final    Differential Method 10/25/2024 Automated   Final    HIV 1/2 Ag/Ab 10/25/2024 Non-reactive  Non-reactive Final    Rubella IgG Antibodies 10/25/2024 57.6  >=10.0 IU/mL Final    Rubella Immune Status 10/25/2024 Reactive   Final    Comment: 0.0-4.9 IU/mL  Nonreactive (Non-Immune)  5.0-9.9 IU/mL  Indeterminate  10.0-400.0 IU/mL Reactive (Immune)    These results were obtained with the IMMULITE 2000 Rubella  Quantitative IgG assay. Values obtained from other   manufacturers' assay methods may not be used interchangeably.       Group & Rh 10/25/2024 B NEG   Final    Indirect Sheryl 10/25/2024 NEG   Final    Treponema Pallidum Antibodies (IgG* 10/25/2024 Nonreactive  Nonreactive Final    Hepatitis B Surface Ag 10/25/2024 Non-reactive  Non-reactive Final    Hepatitis C Ab 10/25/2024 Non-reactive  Non-reactive Final    Hemoglobin A1C 10/25/2024 4.9  4.0 - 5.6 % Final    Comment: ADA Screening Guidelines:  5.7-6.4%  Consistent with prediabetes  >or=6.5%  Consistent with diabetes    High levels of fetal hemoglobin interfere with the HbA1C  assay. Heterozygous hemoglobin variants (HbS, HgC, etc)do  not significantly interfere with this assay.   However, presence of multiple variants may affect accuracy.      Estimated Avg Glucose 10/25/2024 94  68 - 131 mg/dL Final    Sodium 10/25/2024 135 (L)  136 - 145 mmol/L Final    Potassium 10/25/2024 3.5  3.5 - 5.1 mmol/L Final    Chloride 10/25/2024 106  95 - 110 mmol/L Final    CO2 10/25/2024 19 (L)  23 - 29 mmol/L Final    Glucose 10/25/2024 175 (H)  70 - 110 mg/dL Final    BUN 10/25/2024 11  6 - 20 mg/dL Final    Creatinine 10/25/2024 0.7  0.5 - 1.4 mg/dL Final    Calcium 10/25/2024 9.1  8.7 - 10.5 mg/dL Final    Total Protein 10/25/2024 7.0  6.0 - 8.4 g/dL Final    Albumin 10/25/2024 3.8  3.5 - 5.2 g/dL Final    Total Bilirubin 10/25/2024 0.6  0.1 - 1.0 mg/dL Final    Comment: For infants and newborns, interpretation of results  should be based  on gestational age, weight and in agreement with clinical  observations.    Premature Infant recommended reference ranges:  Up to 24 hours.............<8.0 mg/dL  Up to 48 hours............<12.0 mg/dL  3-5 days..................<15.0 mg/dL  6-29 days.................<15.0 mg/dL      Alkaline Phosphatase 10/25/2024 40  40 - 150 U/L Final    AST 10/25/2024 19  10 - 40 U/L Final    ALT 10/25/2024 18  10 - 44 U/L Final    eGFR 10/25/2024 >60.0  >60 mL/min/1.73 m^2 Final    Anion Gap 10/25/2024 10  8 - 16 mmol/L Final    Gene(s) Evaluated 10/25/2024 Comment   Final    3 genes    Ethnicity 10/25/2024 Comment   Final    Not Provided    Indictation: 10/25/2024 Comment   Final    Carrier Test / Screening    Results: 10/25/2024 Comment   Final    NEGATIVE    Interpretation 10/25/2024 Comment   Final    Comment: Negative Results  Disorders (Gene)   Result           Interpretation  Cystic fibrosis    NEGATIVE         This result reduces,  (CFTR)                              but does not  [id number]                         eliminate, the risk  to be a carrier.  Risk: NOT at an  increased risk for  an affected  pregnancy.  Fragile X          NEGATIVE : PCR   Not a carrier of a  syndrome (FMR1)    repeats: 21      fragile X expansion.  [id number]        and 31           Risk: NOT at an  increased risk for  an affected  pregnancy.  Spinal muscular    NEGATIVE : 2     This result reduces,  atrophy (SMN1)     copies of        but does not  NM_000344.4        SMN1;            eliminate, the risk  c.*3+80T>G       to be a carrier.  risk variant     Risk: NOT at an  not present.     increased risk for  an affected  pregnancy.      Recommendations 10/25/2024 Comment   Final    Comment: If the above result is positive, genetic counseling is  recommended to discuss the potential clinical and/or  reproductive implications, as well as recommendations for  testing family members and, when applicable, this  individual's  partner. Genetic counseling services are  available. To access [facility] please  visit [email or web address]  or call [phone] ([phone]).      General Comments 10/25/2024 Comment   Final    Comment: This interpretation is based on the clinical information  provided and the current understanding of the molecular  genetics of the disorder(s) tested. Information about the  disorder(s) tested is available at  [email or web address].      Method/Limitations 10/25/2024 Comment   Final    Comment: Next-generation Sequencing (NGS): Genomic regions of  interest are selected using the [employer])  hybridization capture method and sequenced via the  [employer]) NGS platform. Sequencing reads are aligned to  the human genome reference GRCh37/hg19 build. Regions of  interest include coding exons, intron/exon junctions  (typically +/- 20 nucleotides) and additional genomic  regions with known significant pathogenic variants.  Analytical sensitivity is estimated to be >99% for single  nucleotide variants and small insertions/deletions. Variant  detection is performed by [employer] and in-house  algorithms. Single exon deletions or duplications can be  detected in the CFTR gene with estimated overall analytical  sensitivity >99%. Precise breakpoints are not reported.  Single-exon deletions or duplications are not detected in  some cases due to CNV size limitations, or due to isolated  data quality variation or intrinsic sequence properties.  Confirmatory testing by ortho                           gonal technologies may include  Bobby sequencing, or MLPA analysis.  Reported variants: Pathogenic and likely pathogenic  variants are reported for all tests. Benign and likely  benign variants are typically not reported. Variants of  uncertain significance are reported when included in the  test specification. Variants are specified using the  numbering  and nomenclature recommended by the Human Genome  Variation Society (HGVS, http://www.hgvs.org/). Variant  classification and confirmation are consistent with ACMG  standards and guidelines (Morrison, PMID:57617730; Dom,  PMID:12372891). Detailed variant classification information  and reevaluation are available upon request.  Fragile X Syndrome: PCR analysis with fluorescent primers  is used to amplify the CGG repeat region of the FMR1 gene  and Amelogenin loci on the X and Y chromosomes. PCR  products are sized using capillary electrophoresis and  [email or web address] fragment analysis software. The reportable CGG  repeat ranges are: negativ                           e: <45; intermediate: 45-54;  premutation: ; full mutation: >200.  female  samples with premutations of 55-90 repeats are reflexed to  assess AGG interruptions within the CGG repeats. The  predicted risk for the premutation to expand to a full  mutation in the next generation is based on the size of the  premutation and the number of AGG interruptions. If  indicated, methylation PCR analysis using  methylation-specific immunoprecipitation is performed.  Relative amounts of methylated and unmethylated products  are assessed to determine the methylation status:  unmethylated, partially methylated, or completely  methylated. The analytical sensitivity of this assay for  the detection of expanded alleles in the FMR1 gene is  estimated to be >99%. Reproducibility of repeat numbers is  typically 1 for alleles containing up to 60 repeats, 3 for  alleles with  repeats, and 10 for alleles with >119  repeats. Low levels of mosaicism and FMR1 variants  unrelated to trinucleot                           sonia expansion are not detected by  this assay.  Spinal muscular atrophy: The copy number of SMN1 exon 7 is  assessed relative to internal standard reference genes by  quantitative polymerase chain reaction (qPCR). A  mathematical algorithm calculates 0,  1, 2 and 3 copies with  statistical confidence. In fetal specimens and specimens  with 0 or 1 copies, the primer and probe binding sites are  sequenced to rule out variants that could interfere with  copy number analysis. SMN2 copy number is assessed by  digital droplet PCR analysis relative to an internal  standard reference gene in samples with no copies of SMN1.  For carrier screening, when two copies of SMN1 are  detected, allelic discrimination qPCR targeting c.*3+80T>G  in SMN1 is performed.  Limitations: Technologies used do not detect germline  mosaicism and do not rule out the presence of large  chromosomal aberrations including rearrangements and gene  fusions, or variants in regions or genes not included in  this test, or pos                           sible inter/intragenic interactions  between variants, or repeat expansions. Variant  classification and/or interpretation may change over time  if more information becomes available. False positive or  false negative results may occur for reasons that include:  rare genetic variants, sex chromosome abnormalities,  pseudogene interference, blood transfusions, bone marrow  transplantation, somatic or tissue-specific mosaicism,  mislabeled samples, or erroneous representation of family  relationships.  This test was developed and its performance characteristics  determined by [facility]. It has  not been cleared or approved by the Food and Drug  Administration.  [facility] is a subsidiary of  [employer], using the brand  [employer]. Inheritest(R) and GeneSeq(R) are registered  service marks of [employer].      Information Table 10/25/2024 Comment   Final    Comment: El GOMEZ, Julio C M, Lina S et al. Screening for  autosomal recessive and X-linked conditions during  pregnancy and preconception: a practice resource of the  American College of Medical  Genetics and Genomics ([facility]).  Daniela Med 23, 6400 (2021). PMID: [id number]      Disorders Tested 10/25/2024 Comment   Final    Comment: Cystic fibrosis (1 gene). Autosomal recessive: CFTR  Fragile X syndrome (1 gene). X-linked: FMR1. Males are not  tested for X-linked disorders.  Spinal muscular atrophy (1 gene). Autosomal recessive: SMN1      Director Review 10/25/2024 Comment   Final    Comment: Component Type      Performed At         Laboratory  Director  Technical           [facility]     Madina Arana, PhD,  componentCrossTx,   [email or web address].me  processing          [facility] [place],  [address]  Technical           [facility]     Madina Arana, PhD,  componentCrossTx,   FAC  analysis            [address]  [facility],  [place],  [address]  Professional        [facility]     Dennys Temple,  [place],   PhD, FACMG  [address] [place] [address],  [id number]  Electronically released by Dennys Temple, PhD, [facility]      Patient Gender 10/25/2024 Female   Final    Comment: Performed at:  [facility]  [address] [place]  [id number]  : Madina Arana PhD, Phone:  [phone]     Office Visit on 10/25/2024   Component Date Value Ref Range Status    Urine Culture, Routine 10/25/2024 No significant growth   Final    POC Preg Test, Ur 10/25/2024 Positive (A)  Negative Final     Acceptable 10/25/2024 Yes   Final    Protein, Urine Random 10/25/2024 21 (H)  0 - 15 mg/dL Final    Creatinine, Urine 10/25/2024 309.0  15.0 - 325.0 mg/dL Final    Prot/Creat Ratio, Urine 10/25/2024 0.07  0.00 - 0.20 Final        Assessment:       1. Encounter to establish care    2. 17 weeks gestation of pregnancy        Plan:       Assessment & Plan    Evaluated patient's request for tobacco cessation documentation  Confirmed patient's smoking cessation status since December 2023  Reviewed  pregnancy status and current prenatal care with Dr. Frazier  Noted patient's prior adverse reaction to COVID-19 vaccine (Moderna)  Considered patient's reported heartburn during pregnancy    SUPERVISION OF NORMAL FIRST PREGNANCY:  - Continued prenatal vitamins.  - Continued baby aspirin.  - Contact office if any concerns arise before next OB appointment.    LONG TERM USE OF ASPIRIN:  - Continued baby aspirin.    SLEEP MANAGEMENT:  - Continued occasional melatonin use.    FOLLOW-UP CARE:  - Follow up after pregnancy for general health needs.        Encounter to establish care    17 weeks gestation of pregnancy    I spent a total of 10 minutes on the day of the visit.This includes face to face time and non-face to face time preparing to see the patient (eg, review of tests), obtaining and/or reviewing separately obtained history, documenting clinical information in the electronic or other health record, independently interpreting results and communicating results to the patient/family/caregiver, or care coordinator.    Follow up if symptoms worsen or fail to improve.          11/21/2024 BRAEDEN Silva, ANITA    This note was generated with the assistance of ambient listening technology. Verbal consent was obtained by the patient and accompanying visitor(s) for the recording of patient appointment to facilitate this note. I attest to having reviewed and edited the generated note for accuracy, though some syntax or spelling errors may persist. Please contact the author of this note for any clarification.

## 2024-12-07 ENCOUNTER — PATIENT MESSAGE (OUTPATIENT)
Dept: OTHER | Facility: OTHER | Age: 29
End: 2024-12-07
Payer: COMMERCIAL

## 2024-12-13 ENCOUNTER — HOSPITAL ENCOUNTER (OUTPATIENT)
Dept: OBSTETRICS AND GYNECOLOGY | Facility: CLINIC | Age: 29
Discharge: HOME | End: 2024-12-13
Attending: GENERAL PRACTICE
Payer: COMMERCIAL

## 2024-12-13 DIAGNOSIS — O09.90 SUPERVISION OF HIGH RISK PREGNANCY, ANTEPARTUM: ICD-10-CM

## 2024-12-13 PROCEDURE — 76805 OB US >/= 14 WKS SNGL FETUS: CPT | Mod: 26,,, | Performed by: OBSTETRICS & GYNECOLOGY

## 2024-12-20 ENCOUNTER — ROUTINE PRENATAL (OUTPATIENT)
Dept: OBSTETRICS AND GYNECOLOGY | Facility: CLINIC | Age: 29
End: 2024-12-20
Payer: COMMERCIAL

## 2024-12-20 VITALS
DIASTOLIC BLOOD PRESSURE: 68 MMHG | BODY MASS INDEX: 32.4 KG/M2 | SYSTOLIC BLOOD PRESSURE: 122 MMHG | WEIGHT: 200.75 LBS | HEART RATE: 89 BPM

## 2024-12-20 DIAGNOSIS — O09.90 SUPERVISION OF HIGH RISK PREGNANCY, ANTEPARTUM: Primary | ICD-10-CM

## 2024-12-20 PROCEDURE — 99999 PR PBB SHADOW E&M-EST. PATIENT-LVL II: CPT | Mod: PBBFAC,,, | Performed by: GENERAL PRACTICE

## 2024-12-20 RX ORDER — FAMOTIDINE 20 MG/1
20 TABLET, FILM COATED ORAL 2 TIMES DAILY
Qty: 60 TABLET | Refills: 11 | Status: SHIPPED | OUTPATIENT
Start: 2024-12-20 | End: 2025-12-20

## 2024-12-20 NOTE — PROGRESS NOTES
"  Kandi Gonzáles  29 y.o. G1 MRN  [id number] PATIENT   1995   FINAL EDC:   2025  by IUI date 8/3/24    Baby: girl!  "Reba Aquino"    Spouse Name: Paris ALLERGIES:    COVID Vaccine  NKDA      GBS: ___  Date: ___ OB PROBLEM LIST:    Rh negative    [  ] Rhogam @ 28wks and prn    Obesity, BMI 31, normal baseline PCR & HbA1C       TO-DO THROUGHOUT PREGNANCY:  Depression Screen: 2024   Rhogam: ___ (knows donor is Rh Neg)  Flu Shot: OCT 2024  TDAP: ___  Infant feeding: breast   Plans for epidural: yes  Classes at hospital: offered  PP contraception: n/a  Delivery Consent: ___  Pediatrician: ___ MEDICATIONS:    PNV  Melatonin prn  ASA 81mg (start @ 15+5wks)  Famotidine (adding 21w6d )   TODAY'S PROGRESS NOTE  2024    S/ Kandi is a 29 y.o.  at 21w6d who presents for routine OB appointment.  She denies VB, LOF, CTX's.  She reports normal FM.  Started with heartburn within a week of starting baby ASA.  Takes TUMS sometimes with some relief.    O/  Vitals:    24 1330   BP: 122/68   Pulse: 89     FH: U+1  DT'S: 140bpm by doppler    EPDS Score: 1 out of 30 (>10 warrants further evaluation)    A/P  21w6d for routine OB appointment.    Encouraged to try famotidine.  If not helping then will consider trial off aspirin vs further evaluation.  Anatomy Scan results reviewed with the patient.  No abnormalities were detected, but the anatomy scan was incomplete.  A f/u scan was ordered and will be done in 4-6 weeks.   EPDS depression screen reviewed; no interventions indicated.  The patient was advised to consider:  Choosing a pediatrician for her baby.  A list of local providers is available in the OB Welcome Packet.  Enrolling in free classes offered at [facility] (birthing, breastfeeding, baby care, and sibling classes).  SAB precautions reviewed  RTC ~24wks EGA for PER, sooner prn    Cristin Frazier MD        LABS   INITIAL LABS:  @ 14wks  Lab Results   Component Value Date    GROUPTR B NEG " 10/25/2024    INDIRECTCOOM NEG 10/25/2024      Lab Results   Component Value Date    WBC 8.10 10/25/2024    HGB 12.8 10/25/2024    HCT 36.8 (L) 10/25/2024     10/25/2024    MCV 89 10/25/2024      Lab Results   Component Value Date    HEPBSAG Non-reactive 10/25/2024    HEPCAB Non-reactive 10/25/2024    TREPABIGMIGG Nonreactive 10/25/2024    OYH14MZQF Non-reactive 10/25/2024    RUBELLAIMMUN Reactive 10/25/2024     Lab Results   Component Value Date    VARICELLAZOS 616.00 10/25/2024    VARICELLAINT Positive 10/25/2024     Lab Results   Component Value Date    HGBELECTINTE Normal 10/25/2024     Lab Results   Component Value Date    LABURIN No significant growth 10/25/2024     Lab Results   Component Value Date    CREATININE 0.7 10/25/2024    AST 19 10/25/2024    ALT 18 10/25/2024    BILITOT 0.6 10/25/2024    UTPCR 0.07 10/25/2024      Lab Results   Component Value Date    HGBA1C 4.9 10/25/2024     CT/NG recently neg with [facility] OTHER LABS:  Lab Results   Component Value Date    JHC64M67 Negative 11/07/2024    T18 Negative 11/07/2024    BOC19N52 Negative 11/07/2024    FETALSEXRES female 11/07/2024     CARRIER SCREEN (Inheritest Core): negative for CF, SMA, Fragile X / Date: 11/7/2024      ULTRASOUNDS   ANATOMY SCAN:  DATE: 12/19/24 @ 21+2wks:  SIZE = DATES  ANATOMY: wnl but incomplete  PLACENTA: anterior  CERVIX: normal length  OTHER: fibroid uterus (anterior subserosal [id number])       HISTORY   MEDICAL HISTORY:    Pre-pregnancy BMI = 31  GERD SURGICAL HISTORY:    Cholecystectomy  Appendectomy  Ganglion cyst excision  Hammond tooth extraction       OBSTETRIC HISTORY   Month/Year Mode of Delivery EGA Wt. M/F Epidural Complications / Comments   G1                    SOCIAL HISTORY:    Smoker: non-smoker  Alcohol: denies  Drugs: denies  Relationship: same sex partner and IUI  Domestic Violence: no  Lives with: wife /  SEP 2022  Education Level: Some College  Occupation: [employer]  Sabianism: n/a GYN  HISTORY:    PAP'S: no h/o abnormals  LAST PAP: 11/13/2024: PAP neg   STD Hx: no past history  GENITAL HSV: no FAMILY HISTORY:    HTN: no  DIABETES: no  BLEEDING D/O: no  CLOTTING D/O: no  BIRTH DEFECTS: no  MENTAL DISABILITY: no (Autism in sibling)  GYN CANCER: no

## 2024-12-20 NOTE — PATIENT INSTRUCTIONS
To schedule classes (see below for what's offered) at the hospital, call [phone].    Have a question or concern?    PRO TIP: Program these phone numbers in your cell phone!    for an emergency  call 911 or go to the nearest hospital Especially after 20 weeks of the pregnancy, please remember that  Labor & Delivery is at [facility].  There is no L&D at [facility] (formerly called [facility]).  After hours you can only access L&D through the Emergency Room (entrance on [facility]).   [facility] Nurse Care Advice Line  [phone] At any time during your pregnancy,  you can speak to a nurse 24-7.   for non-urgent issues, send us a  message in [employer] Consider calling the Nurse Care Advice Line if it's a weekend or  toward the end of the work-day since  [employer] and phone messages may not be answered for a day or two.   for non-urgent issues, call the clinic  [phone], Option 3    Labor & Delivery  [phone] Starting at 20 weeks of the pregnancy,  you can speak to a nurse on L&D 24-7.     SECOND TRIMESTER  14+0 - 28+6 weeks    Adapting to Pregnancy: Second Trimester   Keep up the healthy habits you started in your first trimester.  It's not too late to make better choices and drop bad habits - your baby's counting on you!  Most women enjoy this middle part of the pregnancy: first trimester fatigue and morning sickness are probably behind you, and your belly's not yet getting in the way physically.    Your To-Do List  There are several things you should start working on.  More information on these topics can be found in your OB Welcome Packet and the A-Z Book.    Choose a pediatrician for your baby.  Sign up for a tour and classes at the hospital.  All classes are free of charge if you are delivering at [facility].  Call [phone] to register for any of these classes:  Baby Love (learn about the delivery process and caring for a )  Big Brother / Big Sister Class (to help siblings prepare for  baby)  Lamaze (a 4 week class to learn about natural interventions for labor)  Breastfeeding (get a head start learning about breastfeeding)  Work on some methods for coping with the pains of labor.  A good bit of labor happens before you can get an epidural, and you'll feel more confident if you have a plan that you've practiced.  We encourage everyone to breastfeed if they can (and most women can!).  Please ask us questions if you have them.  Decide what you'd like to use for contraception (birth control) after this baby is born.  If you're having a boy, let us know if you plan to have him circumcised.    Pregnancy Milestones  After week 16, avoid lying on your back for more than a few minutes. Instead, lie on your side and switch sides often.  Between 19 and 22 weeks you'll have an ultrasound to look at the baby's anatomy and determine the gender (if you want to know and don't already know). This is around the same time when you should start feeling baby's movements and kicks.  Your gestational diabetes test will be done around 28 weeks.  We'll give you a TDAP booster shot so that your baby is protected from Whooping Cough (pertussis) his/her first few months of life.    When You Travel   The second trimester is a good time for travel. Talk to your health care provider about any special plans you may need to make. Always:   Wear a seat belt. Fasten the lap part under your belly. Wear the shoulder part also.   Take frequent breaks during long trips by car or plane. Move around to stretch your legs.   Drink plenty of fluids on flights. The air in plane cabins is very dry.   Avoid hot climates or high altitudes if you are not used to them.   Avoid places where the food and water might make you sick.    Intimacy  Unless your health care provider tells you otherwise, it's perfectly fine to continue having sex. In fact, many women find sex in the second trimester quite enjoyable due to increased blood supply to the  pelvic area.    Baby!  Organs continue to develop and begin to function, there's formation of eyebrows / eyelashes / fingernails, skin is wrinkled and covered in vernix, genitals develop, a fine hair called lanugo covers the body, and baby is busy: kicking, sleeping, swallowing amniotic fluid, listening to you, passing urine, and sucking those thumbs.    OTHER INFORMATION:  If your provider orders labs or other studies, you probably won't hear from us unless something is abnormal.  How we define normal is different for many things in pregnancy.  This includes several of the numbers on a Complete Blood Count (CBC).  If your result is flagged as abnormal in [employer] but you don't hear from us, it's probably because things are actually normal based on where you are in your pregnancy.

## 2025-01-04 ENCOUNTER — PATIENT MESSAGE (OUTPATIENT)
Dept: OTHER | Facility: OTHER | Age: 30
End: 2025-01-04
Payer: COMMERCIAL

## 2025-01-17 ENCOUNTER — ROUTINE PRENATAL (OUTPATIENT)
Dept: OBSTETRICS AND GYNECOLOGY | Facility: CLINIC | Age: 30
End: 2025-01-17
Payer: COMMERCIAL

## 2025-01-17 VITALS
SYSTOLIC BLOOD PRESSURE: 102 MMHG | BODY MASS INDEX: 33.64 KG/M2 | DIASTOLIC BLOOD PRESSURE: 62 MMHG | WEIGHT: 208.44 LBS | HEART RATE: 103 BPM

## 2025-01-17 DIAGNOSIS — O09.90 SUPERVISION OF HIGH RISK PREGNANCY, ANTEPARTUM: Primary | ICD-10-CM

## 2025-01-17 PROCEDURE — 99999 PR PBB SHADOW E&M-EST. PATIENT-LVL III: CPT | Mod: PBBFAC,,, | Performed by: GENERAL PRACTICE

## 2025-01-17 PROCEDURE — 0502F SUBSEQUENT PRENATAL CARE: CPT | Mod: CPTII,S$GLB,, | Performed by: GENERAL PRACTICE

## 2025-01-17 NOTE — PATIENT INSTRUCTIONS
To schedule classes (see below for what's offered) at the hospital, call [phone].    Have a question or concern?    PRO TIP: Program these phone numbers in your cell phone!    for an emergency  call 911 or go to the nearest hospital Especially after 20 weeks of the pregnancy, please remember that  Labor & Delivery is at [facility].  There is no L&D at [facility] (formerly called [facility]).  After hours you can only access L&D through the Emergency Room (entrance on [facility]).   [facility] Nurse Care Advice Line  [phone] At any time during your pregnancy,  you can speak to a nurse 24-7.   for non-urgent issues, send us a  message in [employer] Consider calling the Nurse Care Advice Line if it's a weekend or  toward the end of the work-day since  [employer] and phone messages may not be answered for a day or two.   for non-urgent issues, call the clinic  [phone], Option 3    Labor & Delivery  [phone] Starting at 20 weeks of the pregnancy,  you can speak to a nurse on L&D 24-7.     SECOND TRIMESTER  14+0 - 28+6 weeks    Adapting to Pregnancy: Second Trimester   Keep up the healthy habits you started in your first trimester.  It's not too late to make better choices and drop bad habits - your baby's counting on you!  Most women enjoy this middle part of the pregnancy: first trimester fatigue and morning sickness are probably behind you, and your belly's not yet getting in the way physically.    Your To-Do List  There are several things you should start working on.  More information on these topics can be found in your OB Welcome Packet and the A-Z Book.    Choose a pediatrician for your baby.  Sign up for a tour and classes at the hospital.  All classes are free of charge if you are delivering at [facility].  Call [phone] to register for any of these classes:  Baby Love (learn about the delivery process and caring for a )  Big Brother / Big Sister Class (to help siblings prepare for  baby)  Lamaze (a 4 week class to learn about natural interventions for labor)  Breastfeeding (get a head start learning about breastfeeding)  Work on some methods for coping with the pains of labor.  A good bit of labor happens before you can get an epidural, and you'll feel more confident if you have a plan that you've practiced.  We encourage everyone to breastfeed if they can (and most women can!).  Please ask us questions if you have them.  Decide what you'd like to use for contraception (birth control) after this baby is born.  If you're having a boy, let us know if you plan to have him circumcised.    Pregnancy Milestones  After week 16, avoid lying on your back for more than a few minutes. Instead, lie on your side and switch sides often.  Between 19 and 22 weeks you'll have an ultrasound to look at the baby's anatomy and determine the gender (if you want to know and don't already know). This is around the same time when you should start feeling baby's movements and kicks.  Your gestational diabetes test will be done around 28 weeks.  We'll give you a TDAP booster shot so that your baby is protected from Whooping Cough (pertussis) his/her first few months of life.    When You Travel   The second trimester is a good time for travel. Talk to your health care provider about any special plans you may need to make. Always:   Wear a seat belt. Fasten the lap part under your belly. Wear the shoulder part also.   Take frequent breaks during long trips by car or plane. Move around to stretch your legs.   Drink plenty of fluids on flights. The air in plane cabins is very dry.   Avoid hot climates or high altitudes if you are not used to them.   Avoid places where the food and water might make you sick.    Intimacy  Unless your health care provider tells you otherwise, it's perfectly fine to continue having sex. In fact, many women find sex in the second trimester quite enjoyable due to increased blood supply to the  pelvic area.    Baby!  Organs continue to develop and begin to function, there's formation of eyebrows / eyelashes / fingernails, skin is wrinkled and covered in vernix, genitals develop, a fine hair called lanugo covers the body, and baby is busy: kicking, sleeping, swallowing amniotic fluid, listening to you, passing urine, and sucking those thumbs.    OTHER INFORMATION:  If your provider orders labs or other studies, you probably won't hear from us unless something is abnormal.  How we define normal is different for many things in pregnancy.  This includes several of the numbers on a Complete Blood Count (CBC).  If your result is flagged as abnormal in [employer] but you don't hear from us, it's probably because things are actually normal based on where you are in your pregnancy.

## 2025-01-17 NOTE — PROGRESS NOTES
"  Kandi Gonzáles  29 y.o. G1 MRN  [id number] PATIENT   1995   FINAL EDC:   2025  by IUI date 8/3/24    Baby: girl!  "Reba Aquino"    Spouse Name: Paris ALLERGIES:    COVID Vaccine  NKDA      GBS: ___  Date: ___ OB PROBLEM LIST:    Rh negative    [  ] Rhogam @ 28wks and prn    Obesity, BMI 31, normal baseline PCR & HbA1C    GETA delayed reaction (syncope, bradycardia 40's, was 2 days post-op from [facility])    [  ] anesthesia consult (order and schedule as NST on L&D, and L&D will notify / call anesthesia on-call that day)       TO-DO THROUGHOUT PREGNANCY:  Depression Screen: 2024   Rhogam: ___ (knows donor is Rh Neg)  Flu Shot: OCT 2024  TDAP: ___  Infant feeding: breast  Pre-register: ___   Plans for epidural: yes  Classes at hospital: offered  PP contraception: n/a  Delivery Consent: 2025   Pediatrician: ___ MEDICATIONS:    PNV  Melatonin prn  ASA 81mg (start @ 15+5wks)  Famotidine (adding 21w6d )   TODAY'S PROGRESS NOTE  2025    NADEEM Chau is a 29 y.o.  at 25w6d who presents for routine OB appointment.  She denies VB, LOF, CTX's.  She reports normal FM.    O/  Vitals:    25 1546   BP: 102/62   Pulse: 103     FH: 28cm  DT'S: 130bpm by doppler    A/P  25w6d for OB appointment.    F/U Anatomy US is scheduled 25  Informed consent obtained for delivery.  See below for full discussion.  Forms will be scanned to Media.  28wk labs ordered, to be done within a few days of her next appointment.  Will need Rhogam.  PTL precautions reviewed  RTC ~28wks EGA for PER, sooner prn    Cristni Frazier MD       LABS   INITIAL LABS:  @ 14wks  Lab Results   Component Value Date    GROUPTRH B NEG 10/25/2024    INDIRECTCOOM NEG 10/25/2024      Lab Results   Component Value Date    WBC 8.10 10/25/2024    HGB 12.8 10/25/2024    HCT 36.8 (L) 10/25/2024     10/25/2024    MCV 89 10/25/2024      Lab Results   Component Value Date    HEPBSAG Non-reactive 10/25/2024    HEPCAB " Non-reactive 10/25/2024    TREPABIGMIGG Nonreactive 10/25/2024    EBB51LXLW Non-reactive 10/25/2024    RUBELLAIMMUN Reactive 10/25/2024     Lab Results   Component Value Date    VARICELLAZOS 616.00 10/25/2024    VARICELLAINT Positive 10/25/2024     Lab Results   Component Value Date    HGBELECTINTE Normal 10/25/2024     Lab Results   Component Value Date    LABURIN No significant growth 10/25/2024     Lab Results   Component Value Date    CREATININE 0.7 10/25/2024    AST 19 10/25/2024    ALT 18 10/25/2024    BILITOT 0.6 10/25/2024    UTPCR 0.07 10/25/2024      Lab Results   Component Value Date    HGBA1C 4.9 10/25/2024     CT/NG recently neg with [facility] OTHER LABS:  Lab Results   Component Value Date    HBL48A96 Negative 11/07/2024    T18 Negative 11/07/2024    NHE46V62 Negative 11/07/2024    FETALSEXRES female 11/07/2024     CARRIER SCREEN (Inheritest Core): negative for CF, SMA, Fragile X / Date: 11/7/2024      ULTRASOUNDS   ANATOMY SCAN:  DATE: 12/19/24 @ 21+2wks:  SIZE = DATES  ANATOMY: wnl but incomplete  PLACENTA: anterior  CERVIX: normal length  OTHER: fibroid uterus (anterior subserosal [id number])       HISTORY   MEDICAL HISTORY:    Pre-pregnancy BMI = 31  GERD SURGICAL HISTORY:    Cholecystectomy  Appendectomy  Ganglion cyst excision  [place] tooth extraction       OBSTETRIC HISTORY   Month/Year Mode of Delivery EGA Wt. M/F Epidural Complications / Comments   G1                    SOCIAL HISTORY:    Smoker: non-smoker  Alcohol: denies  Drugs: denies  Relationship: same sex partner and IUI  Domestic Violence: no  Lives with: wife /  SEP 2022  Education Level: Some College  Occupation: [employer]  Buddhism: n/a GYN HISTORY:    PAP'S: no h/o abnormals  LAST PAP: 11/13/2024: PAP neg   STD Hx: no past history  GENITAL HSV: no FAMILY HISTORY:    HTN: no  DIABETES: no  BLEEDING D/O: no  CLOTTING D/O: no  BIRTH DEFECTS: no  MENTAL DISABILITY: no (Autism in sibling)  GYN CANCER: no       Today we obtained  informed consent for delivery.  We reviewed slides with visual representations that explained in detail what to expect in labor and delivery.  We addressed the fact that our role is to safely guide her and her baby through the birthing process although we are not in control of the process, and neither is she.  We will respond to what her baby and her body give us, as determined by FHR monitoring, tocometry, labor exams, and other clinical indicators.  She understands many emergencies can occur during labor and delivery, and our interventions aren't always adequate or timely enough to prevent complications.  Two emergencies that we discussed in particular are shoulder dystocia and hemorrhage, and these were explained in detail.  She agrees to the use or administration of the following if indicated:  IV fluids, antibiotics, cervical ripening (mechanical and medicinal methods), oxytocin (Pitocin), amniotomy, IV or IM pain medications, epidural or spinal anesthesia, general anesthesia, internal monitors (FSE and IUPC), vacuum or forceps delivery, , episiotomy, medications and mechanical devices for treating hemorrhage, surgical interventions, blood transfusion, and other medicines and interventions considered important for her or her baby's health.  The patient understands that labor, delivery, and the above listed interventions can pose many risks to her and to her baby, which include but are not limited to, viral/bacterial infection, allergic reaction, temporary or permanent bodily injury or disability, brain damage, nerve damage, DVT/PE, and death.  She had adequate opportunity to ask questions, and they were answered to her apparent satisfaction.

## 2025-01-18 ENCOUNTER — PATIENT MESSAGE (OUTPATIENT)
Dept: OTHER | Facility: OTHER | Age: 30
End: 2025-01-18
Payer: COMMERCIAL

## 2025-01-24 ENCOUNTER — HOSPITAL ENCOUNTER (OUTPATIENT)
Dept: OBSTETRICS AND GYNECOLOGY | Facility: CLINIC | Age: 30
Discharge: HOME | End: 2025-01-24
Attending: GENERAL PRACTICE
Payer: COMMERCIAL

## 2025-01-24 DIAGNOSIS — O09.90 SUPERVISION OF HIGH RISK PREGNANCY, ANTEPARTUM: ICD-10-CM

## 2025-01-31 ENCOUNTER — LAB VISIT (OUTPATIENT)
Dept: LAB | Facility: HOSPITAL | Age: 30
End: 2025-01-31
Attending: GENERAL PRACTICE
Payer: COMMERCIAL

## 2025-01-31 DIAGNOSIS — O09.90 SUPERVISION OF HIGH RISK PREGNANCY, ANTEPARTUM: ICD-10-CM

## 2025-01-31 LAB
ABO + RH BLD: NORMAL
BASOPHILS # BLD AUTO: 0.04 K/UL (ref 0–0.2)
BASOPHILS NFR BLD: 0.4 % (ref 0–1.9)
BLD GP AB SCN CELLS X3 SERPL QL: NORMAL
BLD GP AB SCN CELLS X3 SERPL QL: NORMAL
DIFFERENTIAL METHOD BLD: ABNORMAL
EOSINOPHIL # BLD AUTO: 0.1 K/UL (ref 0–0.5)
EOSINOPHIL NFR BLD: 1.6 % (ref 0–8)
ERYTHROCYTE [DISTWIDTH] IN BLOOD BY AUTOMATED COUNT: 12.8 % (ref 11.5–14.5)
FERRITIN SERPL-MCNC: 35 NG/ML (ref 20–300)
FETAL CELL SCN BLD QL ROSETTE: NORMAL
GLUCOSE SERPL-MCNC: 174 MG/DL (ref 70–140)
HCT VFR BLD AUTO: 35.1 % (ref 37–48.5)
HGB BLD-MCNC: 12.1 G/DL (ref 12–16)
IMM GRANULOCYTES # BLD AUTO: 0.14 K/UL (ref 0–0.04)
IMM GRANULOCYTES NFR BLD AUTO: 1.6 % (ref 0–0.5)
LYMPHOCYTES # BLD AUTO: 1.4 K/UL (ref 1–4.8)
LYMPHOCYTES NFR BLD: 15.4 % (ref 18–48)
MCH RBC QN AUTO: 31.9 PG (ref 27–31)
MCHC RBC AUTO-ENTMCNC: 34.5 G/DL (ref 32–36)
MCV RBC AUTO: 93 FL (ref 82–98)
MONOCYTES # BLD AUTO: 0.6 K/UL (ref 0.3–1)
MONOCYTES NFR BLD: 6.3 % (ref 4–15)
NEUTROPHILS # BLD AUTO: 6.7 K/UL (ref 1.8–7.7)
NEUTROPHILS NFR BLD: 74.7 % (ref 38–73)
NRBC BLD-RTO: 0 /100 WBC
PLATELET # BLD AUTO: 154 K/UL (ref 150–450)
PMV BLD AUTO: 10.9 FL (ref 9.2–12.9)
RBC # BLD AUTO: 3.79 M/UL (ref 4–5.4)
WBC # BLD AUTO: 8.96 K/UL (ref 3.9–12.7)

## 2025-01-31 PROCEDURE — 82728 ASSAY OF FERRITIN: CPT | Performed by: GENERAL PRACTICE

## 2025-01-31 PROCEDURE — 85461 HEMOGLOBIN FETAL: CPT | Performed by: GENERAL PRACTICE

## 2025-01-31 PROCEDURE — 36415 COLL VENOUS BLD VENIPUNCTURE: CPT | Performed by: GENERAL PRACTICE

## 2025-01-31 PROCEDURE — 85025 COMPLETE CBC W/AUTO DIFF WBC: CPT | Performed by: GENERAL PRACTICE

## 2025-01-31 PROCEDURE — 82950 GLUCOSE TEST: CPT | Performed by: GENERAL PRACTICE

## 2025-01-31 PROCEDURE — 86900 BLOOD TYPING SEROLOGIC ABO: CPT | Performed by: GENERAL PRACTICE

## 2025-01-31 PROCEDURE — 86593 SYPHILIS TEST NON-TREP QUANT: CPT | Performed by: GENERAL PRACTICE

## 2025-02-01 LAB — TREPONEMA PALLIDUM IGG+IGM AB [PRESENCE] IN SERUM OR PLASMA BY IMMUNOASSAY: NONREACTIVE

## 2025-02-03 ENCOUNTER — TELEPHONE (OUTPATIENT)
Dept: OBSTETRICS AND GYNECOLOGY | Facility: CLINIC | Age: 30
End: 2025-02-03

## 2025-02-03 ENCOUNTER — PATIENT MESSAGE (OUTPATIENT)
Dept: OBSTETRICS AND GYNECOLOGY | Facility: CLINIC | Age: 30
End: 2025-02-03
Payer: COMMERCIAL

## 2025-02-03 ENCOUNTER — CLINICAL SUPPORT (OUTPATIENT)
Dept: OBSTETRICS AND GYNECOLOGY | Facility: CLINIC | Age: 30
End: 2025-02-03
Payer: COMMERCIAL

## 2025-02-03 DIAGNOSIS — O26.893 RH NEGATIVE STATUS DURING PREGNANCY IN THIRD TRIMESTER: Primary | ICD-10-CM

## 2025-02-03 DIAGNOSIS — O09.90 SUPERVISION OF HIGH RISK PREGNANCY, ANTEPARTUM: Primary | ICD-10-CM

## 2025-02-03 DIAGNOSIS — O99.810 ABNORMAL GLUCOSE TOLERANCE TEST (GTT) DURING PREGNANCY, ANTEPARTUM: Primary | ICD-10-CM

## 2025-02-03 DIAGNOSIS — Z67.91 RH NEGATIVE STATUS DURING PREGNANCY IN THIRD TRIMESTER: Primary | ICD-10-CM

## 2025-02-03 PROCEDURE — 99999 PR PBB SHADOW E&M-EST. PATIENT-LVL I: CPT | Mod: PBBFAC,,,

## 2025-02-03 PROCEDURE — 96372 THER/PROPH/DIAG INJ SC/IM: CPT | Mod: S$GLB,,, | Performed by: GENERAL PRACTICE

## 2025-02-04 NOTE — PROGRESS NOTES
Allergies and 2 pt identifiers identified. Rhogam administered IM per order. Patient tolerated well with no adverse reactions. Pt instructed to wait in exam room for 15 min. Pt verbalized understanding.

## 2025-02-07 ENCOUNTER — ROUTINE PRENATAL (OUTPATIENT)
Dept: OBSTETRICS AND GYNECOLOGY | Facility: CLINIC | Age: 30
End: 2025-02-07
Payer: COMMERCIAL

## 2025-02-07 ENCOUNTER — LAB VISIT (OUTPATIENT)
Dept: LAB | Facility: HOSPITAL | Age: 30
End: 2025-02-07
Attending: GENERAL PRACTICE
Payer: COMMERCIAL

## 2025-02-07 VITALS
HEART RATE: 99 BPM | DIASTOLIC BLOOD PRESSURE: 62 MMHG | SYSTOLIC BLOOD PRESSURE: 112 MMHG | BODY MASS INDEX: 34.32 KG/M2 | WEIGHT: 212.63 LBS

## 2025-02-07 DIAGNOSIS — O99.810 ABNORMAL GLUCOSE TOLERANCE TEST (GTT) DURING PREGNANCY, ANTEPARTUM: ICD-10-CM

## 2025-02-07 DIAGNOSIS — O09.90 SUPERVISION OF HIGH RISK PREGNANCY, ANTEPARTUM: Primary | ICD-10-CM

## 2025-02-07 DIAGNOSIS — Z91.89 HISTORY OF GENERAL ANESTHESIA COMPLICATION: ICD-10-CM

## 2025-02-07 LAB
GLUCOSE SERPL-MCNC: 113 MG/DL
GLUCOSE SERPL-MCNC: 148 MG/DL
GLUCOSE SERPL-MCNC: 172 MG/DL
GLUCOSE SERPL-MCNC: 87 MG/DL (ref 70–110)

## 2025-02-07 PROCEDURE — 82952 GTT-ADDED SAMPLES: CPT | Performed by: GENERAL PRACTICE

## 2025-02-07 PROCEDURE — 90471 IMMUNIZATION ADMIN: CPT | Mod: S$GLB,,, | Performed by: GENERAL PRACTICE

## 2025-02-07 PROCEDURE — 36415 COLL VENOUS BLD VENIPUNCTURE: CPT | Performed by: GENERAL PRACTICE

## 2025-02-07 PROCEDURE — 90715 TDAP VACCINE 7 YRS/> IM: CPT | Mod: S$GLB,,, | Performed by: GENERAL PRACTICE

## 2025-02-07 PROCEDURE — 99999 PR PBB SHADOW E&M-EST. PATIENT-LVL III: CPT | Mod: PBBFAC,,, | Performed by: GENERAL PRACTICE

## 2025-02-07 PROCEDURE — 0502F SUBSEQUENT PRENATAL CARE: CPT | Mod: CPTII,S$GLB,, | Performed by: GENERAL PRACTICE

## 2025-02-07 NOTE — PROGRESS NOTES
"    Kandi Gonzáles  29 y.o. G1 MRN  [id number] PATIENT   1995   FINAL EDC:   2025  by IUI date 8/3/24    Baby: girl!  "Reba Aquino"    Spouse Name: Paris ALLERGIES:    COVID Vaccine  NKDA      GBS: ___  Date: ___ OB PROBLEM LIST:    Rh negative    Obesity, BMI 31, normal baseline PCR & HbA1C    GETA delayed reaction (syncope, bradycardia 40's, was 2 days post-op [facility])    [  ] anesthesia consult (order and schedule as NST on L&D, and L&D will notify / call anesthesia on-call that day)       TO-DO THROUGHOUT PREGNANCY:  Depression Screen: 2024   Rhogam: given 2/3/2025 @ 28wks  Flu Shot: OCT 2024  TDAP: 2025   Infant feeding: breast  Pre-register: ___   Plans for epidural: yes  Classes at hospital: offered  PP contraception: n/a  Delivery Consent: 2025   Pediatrician: ___ MEDICATIONS:    PNV  ASA 81mg (start @ 15+5wks)  Famotidine (adding 21w6d )   TODAY'S PROGRESS NOTE  2025    NADEEM Chau is a 29 y.o.  at 28w6d who presents for routine OB appointment.  She denies VB, LOF, CTX's.  She reports normal FM.    O/  Vitals:    25 1447   BP: 112/62   Pulse: 99     FH: 30cm  DT'S: 135bpm by doppler    A/P  28w6d for OB appointment.    28wk labs reviewed with the patient: no further testing indicated.  F/U anatomy ultrasound results reviewed.  Ordered an L&D "procedure."  Patient will call to schedule a time to go and consult with anesthesia re her prior reaction to GETA.  Rhogam administered the other day  PTL precautions reviewed; FMC reviewed.  Reminded patient that regular FMC are expected starting ~28wks.  RTC ~32wks EGA for PER, sooner prn    Cristin Frazier MD        LABS   INITIAL LABS:  @ 14wks  Lab Results   Component Value Date    GROUPTRH B NEG 10/25/2024    INDIRECTCOOM NEG 10/25/2024      Lab Results   Component Value Date    WBC 8.10 10/25/2024    HGB 12.8 10/25/2024    HCT 36.8 (L) 10/25/2024     10/25/2024    MCV 89 10/25/2024      Lab " "Results   Component Value Date    HEPBSAG Non-reactive 10/25/2024    HEPCAB Non-reactive 10/25/2024    TREPABIGMIGG Nonreactive 10/25/2024    SIX81WKBS Non-reactive 10/25/2024    RUBELLAIMMUN Reactive 10/25/2024     Lab Results   Component Value Date    VARICELLAZOS 616.00 10/25/2024    VARICELLAINT Positive 10/25/2024     Lab Results   Component Value Date    HGBELECTINTE Normal 10/25/2024     Lab Results   Component Value Date    LABURIN No significant growth 10/25/2024     Lab Results   Component Value Date    CREATININE 0.7 10/25/2024    AST 19 10/25/2024    ALT 18 10/25/2024    BILITOT 0.6 10/25/2024    UTPCR 0.07 10/25/2024      Lab Results   Component Value Date    HGBA1C 4.9 10/25/2024     CT/NG recently neg with [facility] OTHER LABS:  Lab Results   Component Value Date    LUV07G71 Negative 11/07/2024    T18 Negative 11/07/2024    ESC76N98 Negative 11/07/2024    FETALSEXRES female 11/07/2024     CARRIER SCREEN (Inheritest Core): negative for CF, SMA, Fragile X / Date: 11/7/2024      WBC 8.96 01/31/2025    HGB 12.1 01/31/2025    HCT 35.1 (L) 01/31/2025     01/31/2025    MCV 93 01/31/2025    FERRITIN 35 01/31/2025     Lab Results   Component Value Date    OBGLUCOSESCR 174 (H) 01/31/2025     Lab Results   Component Value Date    TREPABIGMIGG Nonreactive 01/31/2025     Lab Results   Component Value Date    GROUPTRH B NEG 01/31/2025    INDIRECTCOOM NEG 01/31/2025     Lab Results   Component Value Date    GLUCFAST 87 02/07/2025    SIAT6OB 172 02/07/2025    KEWY4CW 148 02/07/2025    YSVV9OK 113 02/07/2025       ULTRASOUNDS   ANATOMY SCAN:  DATE: 12/19/24 @ 21+2wks:  SIZE = DATES  ANATOMY: wnl but incomplete  PLACENTA: anterior  CERVIX: normal length  OTHER: fibroid uterus (anterior subserosal [id number])     DATE: 1/24/25 @ 27+2wks:  SIZE = DATES  ANATOMY: anatomy survey completed and wnl (profile "suboptimal")      HISTORY   MEDICAL HISTORY:    Pre-pregnancy BMI = 31  GERD  GETA delayed reaction (syncope, " bradycardia 40's, was 2 days post-op from [facility]) SURGICAL HISTORY:    Cholecystectomy  Appendectomy  Ganglion cyst excision  [place] tooth extraction       OBSTETRIC HISTORY   Month/Year Mode of Delivery EGA Wt. M/F Epidural Complications / Comments   G1                    SOCIAL HISTORY:    Smoker: non-smoker  Alcohol: denies  Drugs: denies  Relationship: same sex partner and IUI  Domestic Violence: no  Lives with: wife /  SEP 2022  Education Level: Some College  Occupation: [employer]  Shinto: n/a GYN HISTORY:    PAP'S: no h/o abnormals  LAST PAP: 11/13/2024: PAP neg   STD Hx: no past history  GENITAL HSV: no FAMILY HISTORY:    HTN: no  DIABETES: no  BLEEDING D/O: no  CLOTTING D/O: no  BIRTH DEFECTS: no  MENTAL DISABILITY: no (Autism in sibling)  GYN CANCER: no

## 2025-02-07 NOTE — PATIENT INSTRUCTIONS
To schedule classes (see below for what's offered) at the hospital, call [phone].    Have a question or concern?    PRO TIP: Program these phone numbers in your cell phone!    for an emergency  call 911 or go to the nearest hospital Especially after 20 weeks of the pregnancy, please remember that  Labor & Delivery is at [facility].  There is no L&D at [facility] ([facility]  After hours you can only access L&D through the Emergency Room (entrance on [facility]).   [facility] Nurse Care Advice Line  [phone] At any time during your pregnancy,  you can speak to a nurse 24-7.   for non-urgent issues, send us a  message in [employer] Consider calling the Nurse Care Advice Line if it's a weekend or  toward the end of the work-day since  [employer] and phone messages may not be answered for a day or two.   for non-urgent issues, call the clinic  [phone], Option 3    Labor & Delivery  [phone] Starting at 20 weeks of the pregnancy,  you can speak to a nurse on L&D 24-7.     THIRD TRIMESTER  29+0 - 42 weeks    Adapting to Pregnancy: Third Trimester   Some physical discomforts may seem worse in the final weeks of pregnancy. Simple lifestyle changes can help as you keep good posture and use good body mechanics.  Pay attention to your changing center of gravity.  Be kind to yourself and know your limits - your body is hosting an entire other human!  Ask for help if you need it.  Take fiber supplements, drink lots of water, and avoid prolonged sitting or standing to prevent constipation and hemorrhoids.  Be sure you're getting enough rest: avoid caffeine after 3pm, use a warm bath to relax before bedtime, ask for back/neck/shoulder massages from your partner, try drinking warm decaf tea or milk at bedtime, get heartburn under control.  Speaking of heartburnavoid spicy / acidic / sugary foods, especially in the evenings.  Eat slowly and in small amounts, and avoiding eating during the 2  hours before bedtime.  Try sleeping with your upper body elevated.    Your To-Do List  If you haven't already, get these important things done!  A few new tasks include having the carseat ready, having hospital bags packed, and making arrangements if needed for other children and/or pets while you're in the hospital.    We'll ask you to pre-register at the hospital around 36 weeks so you have a little less paperwork to do when the big day arrives.  You can walk-in at any time.  Go in the hospital's main entrance on [address] (near the pharmacy).  Walk in to Registration, which is across from the Information Desk.  You'll need your ID and insurance cards.    More information on these topics can be found in your OB Welcome Packet and the A-Z Book:  Choose a pediatrician for your baby.  Sign up for a tour and classes at the hospital.  All classes are free of charge if you are delivering at [facility].  Call [phone] to register for any of these classes:  Baby Love (learn about the delivery process and caring for a )  Big Brother / Big Sister Class (to help siblings prepare for baby)  Lamaze (a 4 week class to learn about natural interventions for labor)  Breastfeeding (get a head start learning about breastfeeding)  Work on some methods for coping with the pains of labor.  A good bit of labor happens before you can get an epidural, and you'll feel more confident if you have a plan that you've practiced.  We encourage everyone to breastfeed if they can (and most women can!).  Please ask us questions if you have them.  Decide what you'd like to use for contraception (birth control) after this baby is born.  If you're having a boy, let us know if you plan to have him circumcised.    Things to Look Out For  The Four Questions (please read more about these in your OB Welcome Packet): 1) Baby's Movements, 2) Contractions / Cramping, 3) Vaginal bleeding, 4) Leaking fluids  Mood swings and depression - some mood swings are  expected in pregnancy, but please ask for help if you are feeling overwhelmed or sad all the time.  Headaches that don't improve with rest, drinking water, and tylenol.  Severe swelling, especially of the face and hands. Remember some swelling of the lower legs is normal, especially if you have been on your feet for some time.    Intimacy   Unless your doctor tells you not to, it's still fine to continue having sex. The third trimester though can be a challenge comfort-wise. Try different positions and see what's best for you.    Baby!  Baby kicks and stretches despite running low on room, lanugo disappears, baby gains about a half of a pound per week, and bones harden (except for the skull, which remains soft and flexible for delivery).    OTHER INFORMATION:  If your provider orders labs or other studies, you probably won't hear from us unless something is abnormal.  How we define normal is different for many things in pregnancy.  This includes several of the numbers on a Complete Blood Count (CBC).  If your result is flagged as abnormal in MyChart but you don't hear from us, it's probably because things are actually normal based on where you are in your pregnancy.

## 2025-02-15 ENCOUNTER — PATIENT MESSAGE (OUTPATIENT)
Dept: OTHER | Facility: OTHER | Age: 30
End: 2025-02-15
Payer: COMMERCIAL

## 2025-02-18 NOTE — PROGRESS NOTES
Received a message from Dr. Justin Sanchez (anesthesiologist at [facility]).  To summarize:    -impression of her reaction to anesthesia = vagal reaction  -perhaps drop in blood pressure was due to emesis / hypovolemia / pain meds  -not concerned about getting an epidural and/or anesthetics as long as pre-hydrated adi MATOS MD

## 2025-03-01 ENCOUNTER — PATIENT MESSAGE (OUTPATIENT)
Dept: OTHER | Facility: OTHER | Age: 30
End: 2025-03-01
Payer: COMMERCIAL

## 2025-03-07 ENCOUNTER — ROUTINE PRENATAL (OUTPATIENT)
Dept: OBSTETRICS AND GYNECOLOGY | Facility: CLINIC | Age: 30
End: 2025-03-07
Payer: COMMERCIAL

## 2025-03-07 VITALS
BODY MASS INDEX: 35.41 KG/M2 | WEIGHT: 219.38 LBS | DIASTOLIC BLOOD PRESSURE: 72 MMHG | HEART RATE: 88 BPM | SYSTOLIC BLOOD PRESSURE: 118 MMHG

## 2025-03-07 DIAGNOSIS — O09.90 SUPERVISION OF HIGH RISK PREGNANCY, ANTEPARTUM: Primary | ICD-10-CM

## 2025-03-07 PROCEDURE — 99999 PR PBB SHADOW E&M-EST. PATIENT-LVL II: CPT | Mod: PBBFAC,,, | Performed by: GENERAL PRACTICE

## 2025-03-07 NOTE — PROGRESS NOTES
"    Kandi Gonzáles  29 y.o. G1 MRN  [id number] PATIENT   1995   FINAL EDC:   2025  by IUI date 8/3/24    Baby: girl!  "Reba Aquino"    Spouse Name: Paris ALLERGIES:    COVID Vaccine  NKDA      GBS: ___  Date: ___ OB PROBLEM LIST:    Rh negative    Obesity, BMI 31, normal baseline PCR & HbA1C    GETA delayed reaction (syncope, bradycardia 40's, was 2 days post-op [facility]), saw Dr. Sanchez who said epidural/anesthesia okay but stressed pre-hydration (message at bottom of this note)       TO-DO THROUGHOUT PREGNANCY:  Depression Screen: 2024   Rhogam: given 2/3/2025 @ 28wks  Flu Shot: OCT 2024  TDAP: 2025   Infant feeding: breast  Pre-register: ___   Plans for epidural: yes  Classes at hospital: offered  PP contraception: n/a  Delivery Consent: 2025   Pediatrician: ___ MEDICATIONS:    PNV  ASA 81mg (start @ 15+5wks)  Famotidine (adding 21w6d )   TODAY'S PROGRESS NOTE  2025    NADEEM Chau is a 29 y.o.  at 32w6d  who presents for routine OB appointment.  She denies VB, LOF, CTX's.  She reports normal FM.  Leg cramps in the night.    O/  Vitals:    25 1454   BP: 118/72   Pulse: 88     FH: 34cm  DT'S: 135bpm by doppler    A/P  32w6d  for OB appointment.    Stressed importance of hydration, varied diet, potassium, leg stretches.  PTL precautions reviewed; FMC reviewed  RTC ~36wks EGA for PER, sooner prn    Cristin Frazier MD        LABS   INITIAL LABS:  @ 14wks  Lab Results   Component Value Date    GROUPTRH B NEG 10/25/2024    INDIRECTCOOM NEG 10/25/2024      Lab Results   Component Value Date    WBC 8.10 10/25/2024    HGB 12.8 10/25/2024    HCT 36.8 (L) 10/25/2024     10/25/2024    MCV 89 10/25/2024      Lab Results   Component Value Date    HEPBSAG Non-reactive 10/25/2024    HEPCAB Non-reactive 10/25/2024    TREPABIGMIGG Nonreactive 10/25/2024    KAK16ZVBU Non-reactive 10/25/2024    RUBELLAIMMUN Reactive 10/25/2024     Lab Results   Component Value Date    " "VARICELLAZOS 616.00 10/25/2024    VARICELLAINT Positive 10/25/2024     Lab Results   Component Value Date    HGBELECTINTE Normal 10/25/2024     Lab Results   Component Value Date    LABURIN No significant growth 10/25/2024     Lab Results   Component Value Date    CREATININE 0.7 10/25/2024    AST 19 10/25/2024    ALT 18 10/25/2024    BILITOT 0.6 10/25/2024    UTPCR 0.07 10/25/2024      Lab Results   Component Value Date    HGBA1C 4.9 10/25/2024     CT/NG recently neg with [facility] OTHER LABS:  Lab Results   Component Value Date    CQY36N05 Negative 11/07/2024    T18 Negative 11/07/2024    RZA20Z07 Negative 11/07/2024    FETALSEXRES female 11/07/2024     CARRIER SCREEN (Inheritest Core): negative for CF, SMA, Fragile X / Date: 11/7/2024      WBC 8.96 01/31/2025    HGB 12.1 01/31/2025    HCT 35.1 (L) 01/31/2025     01/31/2025    MCV 93 01/31/2025    FERRITIN 35 01/31/2025     Lab Results   Component Value Date    OBGLUCOSESCR 174 (H) 01/31/2025     Lab Results   Component Value Date    TREPABIGMIGG Nonreactive 01/31/2025     Lab Results   Component Value Date    GROUPTRH B NEG 01/31/2025    INDIRECTCOOM NEG 01/31/2025     Lab Results   Component Value Date    GLUCFAST 87 02/07/2025    DWML0HW 172 02/07/2025    LVOP5YE 148 02/07/2025    JCTM6RJ 113 02/07/2025       ULTRASOUNDS   ANATOMY SCAN:  DATE: 12/19/24 @ 21+2wks:  SIZE = DATES  ANATOMY: wnl but incomplete  PLACENTA: anterior  CERVIX: normal length  OTHER: fibroid uterus (anterior subserosal [id number])     DATE: 1/24/25 @ 27+2wks:  SIZE = DATES  ANATOMY: anatomy survey completed and wnl (profile "suboptimal")      HISTORY   MEDICAL HISTORY:    Pre-pregnancy BMI = 31  GERD  GETA delayed reaction (syncope, bradycardia 40's, was 2 days post-op from [facility]) SURGICAL HISTORY:    Cholecystectomy  Appendectomy  Ganglion cyst excision  [place] tooth extraction       OBSTETRIC HISTORY   Month/Year Mode of Delivery EGA Wt. M/F Epidural Complications / Comments   G1  "                   SOCIAL HISTORY:    Smoker: non-smoker  Alcohol: denies  Drugs: denies  Relationship: same sex partner and IUI  Domestic Violence: no  Lives with: wife /  SEP 2022  Education Level: Some College  Occupation: [employer]  Synagogue: n/a GYN HISTORY:    PAP'S: no h/o abnormals  LAST PAP: 11/13/2024: PAP neg   STD Hx: no past history  GENITAL HSV: no FAMILY HISTORY:    HTN: no  DIABETES: no  BLEEDING D/O: no  CLOTTING D/O: no  BIRTH DEFECTS: no  MENTAL DISABILITY: no (Autism in sibling)  GYN CANCER: no       Received a message from Dr. Justin Sanchez (anesthesiologist at [facility]).  To summarize:   -impression of her reaction to anesthesia = vagal reaction  -perhaps drop in blood pressure was due to emesis / hypovolemia / pain meds  -not concerned about getting an epidural and/or anesthetics as long as pre-hydrated adequately

## 2025-03-07 NOTE — PATIENT INSTRUCTIONS
To schedule classes (see below for what's offered) at the hospital, call [phone].    Have a question or concern?    PRO TIP: Program these phone numbers in your cell phone!    for an emergency  call 911 or go to the nearest hospital Especially after 20 weeks of the pregnancy, please remember that  Labor & Delivery is at [facility].  There is no L&D at [facility] ([facility] called [facility]).  After hours you can only access L&D through the Emergency Room (entrance on [facility]).   [facility] Nurse Care Advice Line  [phone] At any time during your pregnancy,  you can speak to a nurse 24-7.   for non-urgent issues, send us a  message in [employer] Consider calling the Nurse Care Advice Line if it's a weekend or  toward the end of the work-day since  [employer] and phone messages may not be answered for a day or two.   for non-urgent issues, call the clinic  [phone], Option 3    Labor & Delivery  [phone] Starting at 20 weeks of the pregnancy,  you can speak to a nurse on L&D 24-7.     THIRD TRIMESTER  29+0 - 42 weeks    Adapting to Pregnancy: Third Trimester   Some physical discomforts may seem worse in the final weeks of pregnancy. Simple lifestyle changes can help as you keep good posture and use good body mechanics.  Pay attention to your changing center of gravity.  Be kind to yourself and know your limits - your body is hosting an entire other human!  Ask for help if you need it.  Take fiber supplements, drink lots of water, and avoid prolonged sitting or standing to prevent constipation and hemorrhoids.  Be sure you're getting enough rest: avoid caffeine after 3pm, use a warm bath to relax before bedtime, ask for back/neck/shoulder massages from your partner, try drinking warm decaf tea or milk at bedtime, get heartburn under control.  Speaking of heartburnavoid spicy / acidic / sugary foods, especially in the evenings.  Eat slowly and in small amounts, and avoiding eating during the 2  hours before bedtime.  Try sleeping with your upper body elevated.    Your To-Do List  If you haven't already, get these important things done!  A few new tasks include having the carseat ready, having hospital bags packed, and making arrangements if needed for other children and/or pets while you're in the hospital.    We'll ask you to pre-register at the hospital around 36 weeks so you have a little less paperwork to do when the big day arrives.  You can walk-in at any time.  Go in the hospital's main entrance on [address] (near the pharmacy).  Walk in to Registration, which is across from the Information Desk.  You'll need your ID and insurance cards.    More information on these topics can be found in your OB Welcome Packet and the A-Z Book:  Choose a pediatrician for your baby.  Sign up for a tour and classes at the hospital.  All classes are free of charge if you are delivering at [facility].  Call [phone] to register for any of these classes:  Baby Love (learn about the delivery process and caring for a )  Big Brother / Big Sister Class (to help siblings prepare for baby)  Lamaze (a 4 week class to learn about natural interventions for labor)  Breastfeeding (get a head start learning about breastfeeding)  Work on some methods for coping with the pains of labor.  A good bit of labor happens before you can get an epidural, and you'll feel more confident if you have a plan that you've practiced.  We encourage everyone to breastfeed if they can (and most women can!).  Please ask us questions if you have them.  Decide what you'd like to use for contraception (birth control) after this baby is born.  If you're having a boy, let us know if you plan to have him circumcised.    Things to Look Out For  The Four Questions (please read more about these in your OB Welcome Packet): 1) Baby's Movements, 2) Contractions / Cramping, 3) Vaginal bleeding, 4) Leaking fluids  Mood swings and depression - some mood swings are  expected in pregnancy, but please ask for help if you are feeling overwhelmed or sad all the time.  Headaches that don't improve with rest, drinking water, and tylenol.  Severe swelling, especially of the face and hands. Remember some swelling of the lower legs is normal, especially if you have been on your feet for some time.    Intimacy   Unless your doctor tells you not to, it's still fine to continue having sex. The third trimester though can be a challenge comfort-wise. Try different positions and see what's best for you.    Baby!  Baby kicks and stretches despite running low on room, lanugo disappears, baby gains about a half of a pound per week, and bones harden (except for the skull, which remains soft and flexible for delivery).    OTHER INFORMATION:  If your provider orders labs or other studies, you probably won't hear from us unless something is abnormal.  How we define normal is different for many things in pregnancy.  This includes several of the numbers on a Complete Blood Count (CBC).  If your result is flagged as abnormal in MyChart but you don't hear from us, it's probably because things are actually normal based on where you are in your pregnancy.

## 2025-03-08 ENCOUNTER — PATIENT MESSAGE (OUTPATIENT)
Dept: OBSTETRICS AND GYNECOLOGY | Facility: CLINIC | Age: 30
End: 2025-03-08
Payer: COMMERCIAL

## 2025-03-22 ENCOUNTER — PATIENT MESSAGE (OUTPATIENT)
Dept: OTHER | Facility: OTHER | Age: 30
End: 2025-03-22
Payer: COMMERCIAL

## 2025-03-27 ENCOUNTER — HOSPITAL ENCOUNTER (OUTPATIENT)
Facility: HOSPITAL | Age: 30
Discharge: HOME | End: 2025-03-27
Attending: GENERAL PRACTICE | Admitting: GENERAL PRACTICE
Payer: COMMERCIAL

## 2025-03-27 ENCOUNTER — ROUTINE PRENATAL (OUTPATIENT)
Dept: OBSTETRICS AND GYNECOLOGY | Facility: CLINIC | Age: 30
End: 2025-03-27
Payer: COMMERCIAL

## 2025-03-27 VITALS
WEIGHT: 224 LBS | DIASTOLIC BLOOD PRESSURE: 82 MMHG | BODY MASS INDEX: 36.15 KG/M2 | HEART RATE: 90 BPM | SYSTOLIC BLOOD PRESSURE: 108 MMHG

## 2025-03-27 VITALS
TEMPERATURE: 98 F | RESPIRATION RATE: 16 BRPM | DIASTOLIC BLOOD PRESSURE: 66 MMHG | HEART RATE: 71 BPM | OXYGEN SATURATION: 98 % | SYSTOLIC BLOOD PRESSURE: 111 MMHG

## 2025-03-27 DIAGNOSIS — O09.90 SUPERVISION OF HIGH RISK PREGNANCY, ANTEPARTUM: ICD-10-CM

## 2025-03-27 DIAGNOSIS — O09.90 SUPERVISION OF HIGH RISK PREGNANCY, ANTEPARTUM: Primary | ICD-10-CM

## 2025-03-27 PROCEDURE — 87653 STREP B DNA AMP PROBE: CPT | Performed by: GENERAL PRACTICE

## 2025-03-27 PROCEDURE — 99999 PR PBB SHADOW E&M-EST. PATIENT-LVL III: CPT | Mod: PBBFAC,,, | Performed by: GENERAL PRACTICE

## 2025-03-27 PROCEDURE — 59025 FETAL NON-STRESS TEST: CPT

## 2025-03-27 PROCEDURE — 99211 OFF/OP EST MAY X REQ PHY/QHP: CPT

## 2025-03-27 RX ORDER — ACETAMINOPHEN 500 MG
1000 TABLET ORAL EVERY 6 HOURS PRN
Status: DISCONTINUED | OUTPATIENT
Start: 2025-03-27 | End: 2025-03-28 | Stop reason: HOSPADM

## 2025-03-27 RX ORDER — ONDANSETRON HYDROCHLORIDE 2 MG/ML
4 INJECTION, SOLUTION INTRAVENOUS EVERY 6 HOURS PRN
Status: CANCELLED | OUTPATIENT
Start: 2025-03-27

## 2025-03-27 RX ORDER — ACETAMINOPHEN 500 MG
1000 TABLET ORAL EVERY 6 HOURS PRN
Status: CANCELLED | OUTPATIENT
Start: 2025-03-27

## 2025-03-27 RX ORDER — ONDANSETRON HYDROCHLORIDE 2 MG/ML
4 INJECTION, SOLUTION INTRAVENOUS EVERY 6 HOURS PRN
Status: DISCONTINUED | OUTPATIENT
Start: 2025-03-27 | End: 2025-03-28 | Stop reason: HOSPADM

## 2025-03-27 NOTE — PROGRESS NOTES
"    Kandi Gonzáles  29 y.o. G1 MRN  [id number] PATIENT   1995   FINAL EDC:   2025  by IUI date 8/3/24    Baby: girl!  "Reba Aquino"    Spouse Name: Paris ALLERGIES:    COVID Vaccine  NKDA      GBS: ___  Date: ___ OB PROBLEM LIST:    Rh negative    Obesity, BMI 31, normal baseline PCR & HbA1C    GETA delayed reaction (syncope, bradycardia 40's, was 2 days post-op from Lx kelin), saw Dr. Sanchez who said epidural/anesthesia okay but stressed pre-hydration (message at bottom of this note)       TO-DO THROUGHOUT PREGNANCY:  Depression Screen: 2024   Rhogam: given 2/3/2025 @ 28wks  Flu Shot: OCT 2024  TDAP: 2025   Infant feeding: breast  Pre-register: ___   Plans for epidural: yes  Classes at hospital: offered  PP contraception: n/a  Delivery Consent: 2025   Pediatrician: Dr. Springer MEDICATIONS:    PNV  ASA 81mg (start @ 15+5wks)  Famotidine (adding 21w6d )   TODAY'S PROGRESS NOTE  2025    NADEEM Chau is a 29 y.o.  at 35w5d who presents for routine OB appointment.  She denies VB, LOF, CTX's.  She reports normal FM..    O/  Vitals:    25 1534   BP: 108/82   Pulse: 90       FH: 36cm  DT'S: 125bpm by doppler - audible decel to the 90's  CEPHALIC by palpation.    GBS culture obtained from vagina/perineum.    NST duration = 20 minutes  FHR: 125bpm baseline, moderate variability, one spont accels, at least one variable decel to the 90's possible others to the 60's  TOCO: no CTX's    A/P  35w5d for OB appointment.  Variable decels on NST.    To L&D for BPP and prolonged monitoring  f/u results of GBS PCR swab  Patient directed to Registration at [facility] to complete preadmission paperwork.  Encouraged patient to have her bags packed and car seat ready.  Also encouraged her to make other arrangements (childcare, pet care, etc.) if needed.  PTL precautions reviewed; FMC reviewed  RTC ~38wks EGA for PER, sooner prolive Frazier MD        LABS   INITIAL LABS:  @ 14wks  Lab Results "   Component Value Date    GROUPTRH B NEG 10/25/2024    INDIRECTCOOM NEG 10/25/2024      Lab Results   Component Value Date    WBC 8.10 10/25/2024    HGB 12.8 10/25/2024    HCT 36.8 (L) 10/25/2024     10/25/2024    MCV 89 10/25/2024      Lab Results   Component Value Date    HEPBSAG Non-reactive 10/25/2024    HEPCAB Non-reactive 10/25/2024    TREPABIGMIGG Nonreactive 10/25/2024    ODR85LQGU Non-reactive 10/25/2024    RUBELLAIMMUN Reactive 10/25/2024     Lab Results   Component Value Date    VARICELLAZOS 616.00 10/25/2024    VARICELLAINT Positive 10/25/2024     Lab Results   Component Value Date    HGBELECTINTE Normal 10/25/2024     Lab Results   Component Value Date    LABURIN No significant growth 10/25/2024     Lab Results   Component Value Date    CREATININE 0.7 10/25/2024    AST 19 10/25/2024    ALT 18 10/25/2024    BILITOT 0.6 10/25/2024    UTPCR 0.07 10/25/2024      Lab Results   Component Value Date    HGBA1C 4.9 10/25/2024     CT/NG recently neg with [facility] OTHER LABS:  Lab Results   Component Value Date    GJV46S56 Negative 11/07/2024    T18 Negative 11/07/2024    OZD48E24 Negative 11/07/2024    FETALSEXRES female 11/07/2024     [facility] negative for CF, SMA, Fragile X / Date: 11/7/2024      WBC 8.96 01/31/2025    HGB 12.1 01/31/2025    HCT 35.1 (L) 01/31/2025     01/31/2025    MCV 93 01/31/2025    FERRITIN 35 01/31/2025     Lab Results   Component Value Date    OBGLUCOSESCR 174 (H) 01/31/2025     Lab Results   Component Value Date    TREPABIGMIGG Nonreactive 01/31/2025     Lab Results   Component Value Date    GROUPTRH B NEG 01/31/2025    INDIRECTCOOM NEG 01/31/2025     Lab Results   Component Value Date    GLUCFAST 87 02/07/2025    HZJP1OH 172 02/07/2025    LWRC3RR 148 02/07/2025    TMGM5AD 113 02/07/2025       ULTRASOUNDS   ANATOMY SCAN:  DATE: 12/19/24 @ 21+2wks:  SIZE = DATES  ANATOMY: wnl but incomplete  PLACENTA: anterior  CERVIX: normal length  OTHER:  "fibroid uterus (anterior subserosal [id number])     DATE: 1/24/25 @ 27+2wks:  SIZE = DATES  ANATOMY: anatomy survey completed and wnl (profile "suboptimal")      HISTORY   MEDICAL HISTORY:    Pre-pregnancy BMI = 31  GERD  GETA delayed reaction (syncope, bradycardia 40's, was 2 days post-op from Lx kelin) SURGICAL HISTORY:    Cholecystectomy  Appendectomy  Ganglion cyst excision  [place] tooth extraction       OBSTETRIC HISTORY   Month/Year Mode of Delivery EGA Wt. M/F Epidural Complications / Comments   G1                    SOCIAL HISTORY:    Smoker: non-smoker  Alcohol: denies  Drugs: denies  Relationship: same sex partner and IUI  Domestic Violence: no  Lives with: wife /  SEP 2022  Education Level: Some College  Occupation: [employer]  Ep[employer]: n/a GYN HISTORY:    PAP'S: no h/o abnormals  LAST PAP: 11/13/2024: PAP neg   STD Hx: no past history  GENITAL HSV: no FAMILY HISTORY:    HTN: no  DIABETES: no  BLEEDING D/O: no  CLOTTING D/O: no  BIRTH DEFECTS: no  MENTAL DISABILITY: no (Autism in sibling)  GYN CANCER: no       Received a message from Dr. Justin Sanchez (anesthesiologist at [facility]).  To summarize:   -impression of her reaction to anesthesia = vagal reaction  -perhaps drop in blood pressure was due to emesis / hypovolemia / pain meds  -not concerned about getting an epidural and/or anesthetics as long as pre-hydrated adequately  "

## 2025-03-27 NOTE — PATIENT INSTRUCTIONS
To schedule classes (see below for what's offered) at the hospital, call [phone].    Have a question or concern?    PRO TIP: Program these phone numbers in your cell phone!    for an emergency  call 911 or go to the nearest hospital Especially after 20 weeks of the pregnancy, please remember that  Labor & Delivery is at [facility].  There is no L&D at [facility] ([facility]  After hours you can only access L&D through the Emergency Room (entrance on [facility]).   [facility] Nurse Care Advice Line  [phone] At any time during your pregnancy,  you can speak to a nurse 24-7.   for non-urgent issues, send us a  message in [employer] Consider calling the Nurse Care Advice Line if it's a weekend or  toward the end of the work-day since  [employer] and phone messages may not be answered for a day or two.   for non-urgent issues, call the clinic  [phone], Option 3    Labor & Delivery  [phone] Starting at 20 weeks of the pregnancy,  you can speak to a nurse on L&D 24-7.     THIRD TRIMESTER  29+0 - 42 weeks    Adapting to Pregnancy: Third Trimester   Some physical discomforts may seem worse in the final weeks of pregnancy. Simple lifestyle changes can help as you keep good posture and use good body mechanics.  Pay attention to your changing center of gravity.  Be kind to yourself and know your limits - your body is hosting an entire other human!  Ask for help if you need it.  Take fiber supplements, drink lots of water, and avoid prolonged sitting or standing to prevent constipation and hemorrhoids.  Be sure you're getting enough rest: avoid caffeine after 3pm, use a warm bath to relax before bedtime, ask for back/neck/shoulder massages from your partner, try drinking warm decaf tea or milk at bedtime, get heartburn under control.  Speaking of heartburnavoid spicy / acidic / sugary foods, especially in the evenings.  Eat slowly and in small amounts, and avoiding eating during the 2  hours before bedtime.  Try sleeping with your upper body elevated.    Your To-Do List  If you haven't already, get these important things done!  A few new tasks include having the carseat ready, having hospital bags packed, and making arrangements if needed for other children and/or pets while you're in the hospital.    We'll ask you to pre-register at the hospital around 36 weeks so you have a little less paperwork to do when the big day arrives.  You can walk-in at any time.  Go in the hospital's main entrance on [address] (near the pharmacy).  Walk in to Registration, which is across from the Information Desk.  You'll need your ID and insurance cards.    More information on these topics can be found in your OB Welcome Packet and the A-Z Book:  Choose a pediatrician for your baby.  Sign up for a tour and classes at the hospital.  All classes are free of charge if you are delivering at [facility].  Call [phone] to register for any of these classes:  Baby Love (learn about the delivery process and caring for a )  Big Brother / Big Sister Class (to help siblings prepare for baby)  Lamaze (a 4 week class to learn about natural interventions for labor)  Breastfeeding (get a head start learning about breastfeeding)  Work on some methods for coping with the pains of labor.  A good bit of labor happens before you can get an epidural, and you'll feel more confident if you have a plan that you've practiced.  We encourage everyone to breastfeed if they can (and most women can!).  Please ask us questions if you have them.  Decide what you'd like to use for contraception (birth control) after this baby is born.  If you're having a boy, let us know if you plan to have him circumcised.    Things to Look Out For  The Four Questions (please read more about these in your OB Welcome Packet): 1) Baby's Movements, 2) Contractions / Cramping, 3) Vaginal bleeding, 4) Leaking fluids  Mood swings and depression - some mood swings are  expected in pregnancy, but please ask for help if you are feeling overwhelmed or sad all the time.  Headaches that don't improve with rest, drinking water, and tylenol.  Severe swelling, especially of the face and hands. Remember some swelling of the lower legs is normal, especially if you have been on your feet for some time.    Intimacy   Unless your doctor tells you not to, it's still fine to continue having sex. The third trimester though can be a challenge comfort-wise. Try different positions and see what's best for you.    Baby!  Baby kicks and stretches despite running low on room, lanugo disappears, baby gains about a half of a pound per week, and bones harden (except for the skull, which remains soft and flexible for delivery).    OTHER INFORMATION:  If your provider orders labs or other studies, you probably won't hear from us unless something is abnormal.  How we define normal is different for many things in pregnancy.  This includes several of the numbers on a Complete Blood Count (CBC).  If your result is flagged as abnormal in MyChart but you don't hear from us, it's probably because things are actually normal based on where you are in your pregnancy.

## 2025-03-28 NOTE — DISCHARGE INSTRUCTIONS
Keep your scheduled appointment with your provider.    Call your Doctor if you have any of the following:  Temperature above 100 degrees  Nausea, vomiting and/or diarrhea  Severe headache, dizziness, or blurred vision  Notable increase in swelling of hands or feet  Notable swelling of face and lips  Difficulty, pain or burning with urination  Foul smelling vaginal discharge  Decreased fetal movement    Come to the hospital if you have any of the following symptoms:  Your water breaks  More than 4-6 contractions in 1 hour for 2 or more hours  Vaginal bleeding like a period    After 28 weeks, you should feel 10 distinct fetal movements within a 2 hour period.    It is recommended that you drink 1/2 a gallon of water each day.  Tea, Soda and Juice are  in addition to this.    Labor and Delivery Phone number: [phone]    If you need to be seen on Labor and delivery between the hours of 6pm and 7am, please enter through the Emergency room entrance.

## 2025-03-30 LAB — GROUP B STREPTOCOCCUS, PCR (OHS): POSITIVE

## 2025-04-11 ENCOUNTER — ROUTINE PRENATAL (OUTPATIENT)
Dept: OBSTETRICS AND GYNECOLOGY | Facility: CLINIC | Age: 30
End: 2025-04-11
Payer: COMMERCIAL

## 2025-04-11 VITALS
DIASTOLIC BLOOD PRESSURE: 72 MMHG | WEIGHT: 225.63 LBS | SYSTOLIC BLOOD PRESSURE: 116 MMHG | BODY MASS INDEX: 36.42 KG/M2 | HEART RATE: 103 BPM

## 2025-04-11 DIAGNOSIS — O09.90 SUPERVISION OF HIGH RISK PREGNANCY, ANTEPARTUM: Primary | ICD-10-CM

## 2025-04-11 PROCEDURE — 99999 PR PBB SHADOW E&M-EST. PATIENT-LVL III: CPT | Mod: PBBFAC,,, | Performed by: GENERAL PRACTICE

## 2025-04-11 NOTE — PATIENT INSTRUCTIONS
To schedule classes (see below for what's offered) at the hospital, call [phone].    Have a question or concern?    PRO TIP: Program these phone numbers in your cell phone!    for an emergency  call 911 or go to the nearest hospital Especially after 20 weeks of the pregnancy, please remember that  Labor & Delivery is at [facility].  There is no L&D at [facility] ([facility]  After hours you can only access L&D through the Emergency Room (entrance on [facility]).   [facility] Nurse Care Advice Line  [phone] At any time during your pregnancy,  you can speak to a nurse 24-7.   for non-urgent issues, send us a  message in [employer] Consider calling the Nurse Care Advice Line if it's a weekend or  toward the end of the work-day since  [employer] and phone messages may not be answered for a day or two.   for non-urgent issues, call the clinic  [phone], Option 3    Labor & Delivery  [phone] Starting at 20 weeks of the pregnancy,  you can speak to a nurse on L&D 24-7.     THIRD TRIMESTER  29+0 - 42 weeks    Adapting to Pregnancy: Third Trimester   Some physical discomforts may seem worse in the final weeks of pregnancy. Simple lifestyle changes can help as you keep good posture and use good body mechanics.  Pay attention to your changing center of gravity.  Be kind to yourself and know your limits - your body is hosting an entire other human!  Ask for help if you need it.  Take fiber supplements, drink lots of water, and avoid prolonged sitting or standing to prevent constipation and hemorrhoids.  Be sure you're getting enough rest: avoid caffeine after 3pm, use a warm bath to relax before bedtime, ask for back/neck/shoulder massages from your partner, try drinking warm decaf tea or milk at bedtime, get heartburn under control.  Speaking of heartburnavoid spicy / acidic / sugary foods, especially in the evenings.  Eat slowly and in small amounts, and avoiding eating during the 2  hours before bedtime.  Try sleeping with your upper body elevated.    Your To-Do List  If you haven't already, get these important things done!  A few new tasks include having the carseat ready, having hospital bags packed, and making arrangements if needed for other children and/or pets while you're in the hospital.    We'll ask you to pre-register at the hospital around 36 weeks so you have a little less paperwork to do when the big day arrives.  You can walk-in at any time.  Go in the hospital's main entrance on [address] (near the pharmacy).  Walk in to Registration, which is across from the Information Desk.  You'll need your ID and insurance cards.    More information on these topics can be found in your OB Welcome Packet and the A-Z Book:  Choose a pediatrician for your baby.  Sign up for a tour and classes at the hospital.  All classes are free of charge if you are delivering at [facility].  Call [phone] to register for any of these classes:  Baby Love (learn about the delivery process and caring for a )  Big Brother / Big Sister Class (to help siblings prepare for baby)  Lamaze (a 4 week class to learn about natural interventions for labor)  Breastfeeding (get a head start learning about breastfeeding)  Work on some methods for coping with the pains of labor.  A good bit of labor happens before you can get an epidural, and you'll feel more confident if you have a plan that you've practiced.  We encourage everyone to breastfeed if they can (and most women can!).  Please ask us questions if you have them.  Decide what you'd like to use for contraception (birth control) after this baby is born.  If you're having a boy, let us know if you plan to have him circumcised.    Things to Look Out For  The Four Questions (please read more about these in your OB Welcome Packet): 1) Baby's Movements, 2) Contractions / Cramping, 3) Vaginal bleeding, 4) Leaking fluids  Mood swings and depression - some mood swings are  expected in pregnancy, but please ask for help if you are feeling overwhelmed or sad all the time.  Headaches that don't improve with rest, drinking water, and tylenol.  Severe swelling, especially of the face and hands. Remember some swelling of the lower legs is normal, especially if you have been on your feet for some time.    Intimacy   Unless your doctor tells you not to, it's still fine to continue having sex. The third trimester though can be a challenge comfort-wise. Try different positions and see what's best for you.    Baby!  Baby kicks and stretches despite running low on room, lanugo disappears, baby gains about a half of a pound per week, and bones harden (except for the skull, which remains soft and flexible for delivery).    OTHER INFORMATION:  If your provider orders labs or other studies, you probably won't hear from us unless something is abnormal.  How we define normal is different for many things in pregnancy.  This includes several of the numbers on a Complete Blood Count (CBC).  If your result is flagged as abnormal in MyChart but you don't hear from us, it's probably because things are actually normal based on where you are in your pregnancy.

## 2025-04-11 NOTE — PROGRESS NOTES
"    Kandi Gonzáles  29 y.o. G1 MRN  [id number] PATIENT   1995   FINAL EDC:   2025  by IUI date 8/3/24    Baby: girl!  "Reba Aquino"    Spouse Name: Paris ALLERGIES:    COVID Vaccine  NKDA      GRBSTREPPCR Positive (A) 2025    OB PROBLEM LIST:    Rh negative    Obesity, BMI 31, normal baseline PCR & HbA1C    GETA delayed reaction (syncope, bradycardia 40's, was 2 days post-op from Lx kelin), saw Dr. Sanchez who said epidural/anesthesia okay but stressed pre-hydration (message at bottom of this note)       TO-DO THROUGHOUT PREGNANCY:  Depression Screen: 2024   Rhogam: given 2/3/2025 @ 28wks  Flu Shot: OCT 2024  TDAP: 2025   Infant feeding: breast  Pre-register: ___   Plans for epidural: yes  Classes at hospital: offered  PP contraception: n/a  Delivery Consent: 2025   Pediatrician: Dr. Springer MEDICATIONS:    PNV  ASA 81mg (start @ 15+5wks)  Famotidine (adding 21w6d )   TODAY'S PROGRESS NOTE  2025    NADEEM Chau is a 29 y.o.  at 37w6d who presents for routine OB appointment.  She denies VB, LOF, or regular CTX's.  She reports normal FM..    O/  Vitals:    25 1557   BP: 116/72   Pulse: 103     FH: 40cm  DT'S: 140bpm by doppler  CEPHALIC by palpation, EFW 8lbs by palpation    CVX 1/50/soft/posterior to mid/cephalic    A/P  37w6d for routine OB appointment.    reviewed positive GBS results with the patient  labor precautions reviewed, FMC reviewed  RTC weekly until delivery, sooner prn.  Interested in delivering on  (grandmother's bday)    Cristin Frazier MD        LABS   INITIAL LABS:  @ 14wks  Lab Results   Component Value Date    GROUPTRH B NEG 10/25/2024    INDIRECTCOOM NEG 10/25/2024      Lab Results   Component Value Date    WBC 8.10 10/25/2024    HGB 12.8 10/25/2024    HCT 36.8 (L) 10/25/2024     10/25/2024    MCV 89 10/25/2024      Lab Results   Component Value Date    HEPBSAG Non-reactive 10/25/2024    HEPCAB Non-reactive 10/25/2024    " "TREPABIGMIGG Nonreactive 10/25/2024    CPM56YHDC Non-reactive 10/25/2024    RUBELLAIMMUN Reactive 10/25/2024     Lab Results   Component Value Date    VARICELLAZOS 616.00 10/25/2024    VARICELLAINT Positive 10/25/2024     Lab Results   Component Value Date    HGBELECTINTE Normal 10/25/2024     Lab Results   Component Value Date    LABURIN No significant growth 10/25/2024     Lab Results   Component Value Date    CREATININE 0.7 10/25/2024    AST 19 10/25/2024    ALT 18 10/25/2024    BILITOT 0.6 10/25/2024    UTPCR 0.07 10/25/2024      Lab Results   Component Value Date    HGBA1C 4.9 10/25/2024     CT/NG recently neg with [facility] OTHER LABS:  Lab Results   Component Value Date    NUX28G67 Negative 11/07/2024    T18 Negative 11/07/2024    QZE59E48 Negative 11/07/2024    FETALSEXRES female 11/07/2024     CARRIER SCREEN (Inheritest Core): negative for CF, SMA, Fragile X / Date: 11/7/2024      WBC 8.96 01/31/2025    HGB 12.1 01/31/2025    HCT 35.1 (L) 01/31/2025     01/31/2025    MCV 93 01/31/2025    FERRITIN 35 01/31/2025     Lab Results   Component Value Date    OBGLUCOSESCR 174 (H) 01/31/2025     Lab Results   Component Value Date    TREPABIGMIGG Nonreactive 01/31/2025     Lab Results   Component Value Date    GROUPTRH B NEG 01/31/2025    INDIRECTCOOM NEG 01/31/2025     Lab Results   Component Value Date    GLUCFAST 87 02/07/2025    YDCW9FZ 172 02/07/2025    GOXK9ZK 148 02/07/2025    ISRC6ZG 113 02/07/2025       ULTRASOUNDS   ANATOMY SCAN:  DATE: 12/19/24 @ 21+2wks:  SIZE = DATES  ANATOMY: wnl but incomplete  PLACENTA: anterior  CERVIX: normal length  OTHER: fibroid uterus (anterior subserosal [id number])     DATE: 1/24/25 @ 27+2wks:  SIZE = DATES  ANATOMY: anatomy survey completed and wnl (profile "suboptimal")      HISTORY   MEDICAL HISTORY:    Pre-pregnancy BMI = 31  GERD  GETA delayed reaction (syncope, bradycardia 40's, was 2 days post-op from Lx kelin) SURGICAL " HISTORY:    Cholecystectomy  Appendectomy  Ganglion cyst excision  [place] tooth extraction       OBSTETRIC HISTORY   Month/Year Mode of Delivery EGA Wt. M/F Epidural Complications / Comments   G1                    SOCIAL HISTORY:    Smoker: non-smoker  Alcohol: denies  Drugs: denies  Relationship: same sex partner and IUI  Domestic Violence: no  Lives with: wife /  SEP 2022  Education Level: Some College  Occupation: [employer]  Scientology: n/a GYN HISTORY:    PAP'S: no h/o abnormals  LAST PAP: 11/13/2024: PAP neg   STD Hx: no past history  GENITAL HSV: no FAMILY HISTORY:    HTN: no  DIABETES: no  BLEEDING D/O: no  CLOTTING D/O: no  BIRTH DEFECTS: no  MENTAL DISABILITY: no (Autism in sibling)  GYN CANCER: no       Received a message from Dr. Justin Sanchez (anesthesiologist at [facility]).  To summarize:   -impression of her reaction to anesthesia = vagal reaction  -perhaps drop in blood pressure was due to emesis / hypovolemia / pain meds  -not concerned about getting an epidural and/or anesthetics as long as pre-hydrated adequately

## 2025-04-17 ENCOUNTER — ROUTINE PRENATAL (OUTPATIENT)
Dept: OBSTETRICS AND GYNECOLOGY | Facility: CLINIC | Age: 30
End: 2025-04-17
Payer: COMMERCIAL

## 2025-04-17 VITALS
DIASTOLIC BLOOD PRESSURE: 80 MMHG | SYSTOLIC BLOOD PRESSURE: 112 MMHG | BODY MASS INDEX: 36.9 KG/M2 | HEART RATE: 86 BPM | WEIGHT: 228.63 LBS

## 2025-04-17 DIAGNOSIS — Z3A.38 38 WEEKS GESTATION OF PREGNANCY: Primary | ICD-10-CM

## 2025-04-17 DIAGNOSIS — O26.893 RH NEGATIVE STATUS DURING PREGNANCY IN THIRD TRIMESTER: ICD-10-CM

## 2025-04-17 DIAGNOSIS — Z67.91 RH NEGATIVE STATUS DURING PREGNANCY IN THIRD TRIMESTER: ICD-10-CM

## 2025-04-17 DIAGNOSIS — B95.1 POSITIVE GBS TEST: ICD-10-CM

## 2025-04-17 PROCEDURE — 99999 PR PBB SHADOW E&M-EST. PATIENT-LVL III: CPT | Mod: PBBFAC,,, | Performed by: OBSTETRICS & GYNECOLOGY

## 2025-04-17 RX ORDER — MUPIROCIN 20 MG/G
OINTMENT TOPICAL
OUTPATIENT
Start: 2025-04-17

## 2025-04-17 RX ORDER — CARBOPROST TROMETHAMINE 250 UG/ML
250 INJECTION, SOLUTION INTRAMUSCULAR
OUTPATIENT
Start: 2025-04-17

## 2025-04-17 RX ORDER — MISOPROSTOL 200 UG/1
800 TABLET ORAL ONCE AS NEEDED
OUTPATIENT
Start: 2025-04-17 | End: 2036-09-13

## 2025-04-17 RX ORDER — OXYTOCIN-SODIUM CHLORIDE 0.9% IV SOLN 30 UNIT/500ML 30-0.9/5 UT/ML-%
10 SOLUTION INTRAVENOUS ONCE AS NEEDED
OUTPATIENT
Start: 2025-04-17 | End: 2036-09-13

## 2025-04-17 RX ORDER — OXYTOCIN-SODIUM CHLORIDE 0.9% IV SOLN 30 UNIT/500ML 30-0.9/5 UT/ML-%
30 SOLUTION INTRAVENOUS ONCE AS NEEDED
OUTPATIENT
Start: 2025-04-17 | End: 2036-09-13

## 2025-04-17 RX ORDER — SODIUM CHLORIDE, SODIUM LACTATE, POTASSIUM CHLORIDE, CALCIUM CHLORIDE 600; 310; 30; 20 MG/100ML; MG/100ML; MG/100ML; MG/100ML
INJECTION, SOLUTION INTRAVENOUS CONTINUOUS
OUTPATIENT
Start: 2025-04-17

## 2025-04-17 RX ORDER — METHYLERGONOVINE MALEATE 0.2 MG/ML
200 INJECTION INTRAVENOUS ONCE AS NEEDED
OUTPATIENT
Start: 2025-04-17 | End: 2036-09-13

## 2025-04-17 RX ORDER — LIDOCAINE HYDROCHLORIDE 10 MG/ML
10 INJECTION, SOLUTION INFILTRATION; PERINEURAL ONCE AS NEEDED
OUTPATIENT
Start: 2025-04-17 | End: 2036-09-13

## 2025-04-17 RX ORDER — CALCIUM CARBONATE 200(500)MG
500 TABLET,CHEWABLE ORAL 3 TIMES DAILY PRN
OUTPATIENT
Start: 2025-04-17

## 2025-04-17 RX ORDER — OXYTOCIN 10 [USP'U]/ML
10 INJECTION, SOLUTION INTRAMUSCULAR; INTRAVENOUS ONCE AS NEEDED
OUTPATIENT
Start: 2025-04-17 | End: 2036-09-13

## 2025-04-17 RX ORDER — ONDANSETRON 8 MG/1
8 TABLET, ORALLY DISINTEGRATING ORAL EVERY 8 HOURS PRN
OUTPATIENT
Start: 2025-04-17

## 2025-04-17 RX ORDER — MISOPROSTOL 200 UG/1
800 TABLET ORAL ONCE AS NEEDED
OUTPATIENT
Start: 2025-04-17

## 2025-04-17 RX ORDER — SIMETHICONE 80 MG
1 TABLET,CHEWABLE ORAL 4 TIMES DAILY PRN
OUTPATIENT
Start: 2025-04-17

## 2025-04-17 RX ORDER — DIPHENOXYLATE HYDROCHLORIDE AND ATROPINE SULFATE 2.5; .025 MG/1; MG/1
2 TABLET ORAL EVERY 6 HOURS PRN
OUTPATIENT
Start: 2025-04-17

## 2025-04-17 NOTE — PROGRESS NOTES
"  Prenatal Note   Chief Complaint:  Routine Prenatal Visit (38w5d)     Patient ID: Kandi Gonzáles is a  29 y.o. female.    Date: 2025    Kandi Gonzáles  29 y.o. G1 MRN  [id number] PATIENT   1995   FINAL EDC:   2025  by IUI date 8/3/24    Baby: girl!  "Reba Aquino"    Spouse Name: Paris ALLERGIES:    COVID Vaccine  NKDA      GRBSTREPPCR Positive (A) 2025    OB PROBLEM LIST:    Rh negative    Obesity, BMI 31, normal baseline PCR & HbA1C    GETA delayed reaction (syncope, bradycardia 40's, was 2 days post-op from Lx kelin), saw Dr. Sanchez who said epidural/anesthesia okay but stressed pre-hydration (message at bottom of this note)       TO-DO THROUGHOUT PREGNANCY:  Depression Screen: 2024   Rhogam: given 2/3/2025 @ 28wks  Flu Shot: OCT 2024  TDAP: 2025   Infant feeding: breast   Plans for epidural: yes  Classes at hospital: offered  PP contraception: SSSP  Delivery Consent: 2025   Pediatrician: Dr. Springer MEDICATIONS:    PNV  ASA 81mg (start @ 15+5wks)  Famotidine (adding 21w6d )   TODAY'S PROGRESS NOTE    S/  y.o. y.o.  at 38-5/7 weeks who presents for routine prenatal evaluation.      She reports normal FM..    She denies vaginal bleeding.  She denies leakage of fluid.  She denies contractions or abdominal pain.  She denies pelvic pressure.   She denies headaches, vision changes or right upper quadrant pain.    O/  VITALS:  BP: 112/80    Vitals:    25 1531   BP: 112/80   Pulse: 86     Wt Readings from Last 1 Encounters:   25 103.7 kg (228 lb 9.9 oz)       FH: 36 cm cm  DT'S: 150's bpm by doppler    Dilation: 2 cm  Effacement: 75 %  Station: + 1    Position: mid  Presentation: Cephalic   Soft      Bill score: 8    A/P  Intrauterine pregnancy at 38-5/7 weeks  Rh negative   Group B strep positive    The above was reviewed discussed with the patient and her significant other.    From an OB standpoint she is currently doing well.    We discussed " evaluation of her cervix at this time.    The patient does desire to proceed with induction of labor and would like to initially present on the 24th with plans for delivery if possible on April 25th.  The pros cons risks benefits alternatives indications of the procedure were discussed with the patient and induction of labor as above has been scheduled.         LABS   INITIAL LABS:  @ 14wks  Lab Results   Component Value Date    GROUPTRH B NEG 10/25/2024    INDIRECTCOOM NEG 10/25/2024      Lab Results   Component Value Date    WBC 8.10 10/25/2024    HGB 12.8 10/25/2024    HCT 36.8 (L) 10/25/2024     10/25/2024    MCV 89 10/25/2024      Lab Results   Component Value Date    HEPBSAG Non-reactive 10/25/2024    HEPCAB Non-reactive 10/25/2024    TREPABIGMIGG Nonreactive 10/25/2024    HBR45BNXL Non-reactive 10/25/2024    RUBELLAIMMUN Reactive 10/25/2024     Lab Results   Component Value Date    VARICELLAZOS 616.00 10/25/2024    VARICELLAINT Positive 10/25/2024     Lab Results   Component Value Date    HGBELECTINTE Normal 10/25/2024     Lab Results   Component Value Date    LABURIN No significant growth 10/25/2024     Lab Results   Component Value Date    CREATININE 0.7 10/25/2024    AST 19 10/25/2024    ALT 18 10/25/2024    BILITOT 0.6 10/25/2024    UTPCR 0.07 10/25/2024      Lab Results   Component Value Date    HGBA1C 4.9 10/25/2024     CT/NG recently neg with [facility] OTHER LABS:  Lab Results   Component Value Date    TNM47O34 Negative 11/07/2024    T18 Negative 11/07/2024    ZNE04V47 Negative 11/07/2024    FETALSEXRES female 11/07/2024     CARRIER SCREEN (Inheritest Core): negative for CF, SMA, Fragile X / Date: 11/7/2024      WBC 8.96 01/31/2025    HGB 12.1 01/31/2025    HCT 35.1 (L) 01/31/2025     01/31/2025    MCV 93 01/31/2025    FERRITIN 35 01/31/2025     Lab Results   Component Value Date    OBGLUCOSESCR 174 (H) 01/31/2025     Lab Results   Component Value Date    TREPABIGMIGG Nonreactive  "01/31/2025     Lab Results   Component Value Date    GROUPTRH B NEG 01/31/2025    INDIRECTCOOM NEG 01/31/2025     Lab Results   Component Value Date    GLUCFAST 87 02/07/2025    PHQU5WX 172 02/07/2025    ADTP1DT 148 02/07/2025    VUIU4ZM 113 02/07/2025       ULTRASOUNDS   ANATOMY SCAN:  DATE: 12/19/24 @ 21+2wks:  SIZE = DATES  ANATOMY: wnl but incomplete  PLACENTA: anterior  CERVIX: normal length  OTHER: fibroid uterus (anterior subserosal 6w3o7xg)     DATE: 1/24/25 @ 27+2wks:  SIZE = DATES  ANATOMY: anatomy survey completed and wnl (profile "suboptimal")      HISTORY   MEDICAL HISTORY:    Pre-pregnancy BMI = 31  GERD  GETA delayed reaction (syncope, bradycardia 40's, was 2 days post-op from Lx kelin) SURGICAL HISTORY:    Cholecystectomy  Appendectomy  Ganglion cyst excision  [place] tooth extraction       OBSTETRIC HISTORY   Month/Year Mode of Delivery EGA Wt. M/F Epidural Complications / Comments   G1                    SOCIAL HISTORY:    Smoker: non-smoker  Alcohol: denies  Drugs: denies  Relationship: same sex partner and IUI  Domestic Violence: no  Lives with: wife /  SEP 2022  Education Level: Some College  Occupation: [employer]  Evangelical: n/a GYN HISTORY:    PAP'S: no h/o abnormals  LAST PAP: 11/13/2024: PAP neg   STD Hx: no past history  GENITAL HSV: no FAMILY HISTORY:    HTN: no  DIABETES: no  BLEEDING D/O: no  CLOTTING D/O: no  BIRTH DEFECTS: no  MENTAL DISABILITY: no (Autism in sibling)  GYN CANCER: no         Plan:      38 weeks gestation of pregnancy  -     IP OB Labor Induction; Future  -     Vital Signs; Standing  -     Vital signs- Early Labor(0-4 cm); Standing  -     Vital Signs- Active Labor (4-10 cm); Standing  -     Vital Signs Post Delivery; Standing  -     Check Temperature, Membranes Intact; Standing  -     Check Temperature, Membranes Ruptured; Standing  -     Pulse Oximetry Continous while CONTINUOUS electronic fetal Monitor in use; Standing  -     Cervical Exam; Standing  -     " Fetal / Uterine Monitoring - Continuous; Standing  -     Fetal monitoring - scalp electrode; Standing  -     Insert peripheral IV; Standing  -     Rodriguez to Gravity; Standing  -     Rodriguez Catheter Care every 12 hours; Standing  -     Nurse to discontinue rodriguez when patient no longer meets criteria; Standing  -     Post Rodriguez Catheter Removal Protocol; Standing  -     Perform Bladder scan; Standing  -     Perform Intermittent Catheterization; Standing  -     Perform Bladder Scan, post intermittent cath; Standing  -     Place indwelling catheter; Standing  -     Watch for excessive vaginal bleeding; Standing  -     Chlorhexidine Bath; Standing  -     Decrease Oxytocin; Standing  -     Discontinue oxytocin; Standing  -     Oxytocin Titration Resumption; Standing  -     Amnioinfusion PRN recurrent variable decelerations; Standing  -     Administer a 500 -1000 ml IV fluid bolus as needed for; Standing  -     Assess fundal height/uterine firmness, lochia, perineum; Standing  -     Notify Physician; Standing  -     Notify OB care provider; Standing  -     Notify physician of cervical change or lack of change; Standing  -     Notify physician for excessive uterine activity; Standing  -     Notify physician for Fetal Heart Tones consistent with Category II or Category III tracing; Standing  -     Notify Pediatrician after delivery; Standing  -     Notify Nursery / Level II Nursery; Standing  -     Notify Nursery / Level II Nursery; Standing  -     Abdominal prep for  Section; Standing  -     Chlorhexidine (CHG) 2% Wipes; Standing  -     Chlorohexidine Gluconate Bath; Standing  -     Vital signs every 15 minutes; Standing  -     Vital signs every 15 minutes; Standing  -     miSOPROStoL tablet 800 mcg  -     miSOPROStoL tablet 800 mcg  -     CBC with Auto Differential; Standing  -     Comprehensive metabolic panel; Standing  -     Type & Screen, Labor & Delivery; Standing  -     Treponema Pallidium Antibodies IgG, IgM;  Standing  -     HIV 1/2 Ag/Ab (4th Gen); Standing  -     Hepatitis B surface antigen; Standing  -     POCT Cord Blood Gas Umbilical Artery Cord Gas; Standing  -     POCT Cord Blood Gas Umbilical Vein Cord Gas; Standing  -     Inpatient consult to Anesthesiology; Standing  -     Full code; Standing  -     Admit to Inpatient; Standing  -     CBC auto differential; Standing  -     Type & Screen; Standing  -     Treponema Pallidium Antibodies IgG, IgM; Standing  -     Prior authorization Order    Rh negative status during pregnancy in third trimester    Positive GBS test    Other orders  -     Change position, roll left or right; Standing  -     lactated ringers bolus 1,000 mL  -     lactated ringers bolus 500 mL  -     lactated ringers infusion  -     IP VTE LOW RISK PATIENT; Standing  -     mupirocin 2 % ointment  -     oxytocin 15 units/250 mL (60 milliunits/mL) in 0.9% NaCl IV bolus from bag  -     oxytocin (PITOCIN) 15 Units in 0.9% NaCl 250 mL infusion  -     ondansetron disintegrating tablet 8 mg  -     calcium carbonate 200 mg calcium (500 mg) chewable tablet 500 mg  -     simethicone chewable tablet 80 mg  -     LIDOcaine HCL 10 mg/ml (1%) injection 10 mL  -     oxytocin 15 units/250 mL (60 milliunits/mL) in 0.9% NaCl IV bolus from bag  -     oxytocin (PITOCIN) 15 Units in 0.9% NaCl 250 mL infusion  -     oxytocin injection 10 Units  -     methylergonovine injection 200 mcg  -     carboprost injection 250 mcg  -     tranexamic acid (CYKLOKAPRON) 1,000 mg in 0.9% NaCl 100 mL IVPB (MB+)  -     diphenoxylate-atropine 2.5-0.025 mg per tablet 2 tablet  -     penicillin G potassium 5 Million Units in D5W 100 mL IVPB (MB+)  -     penicillin G potassium 3 Million Units in D5W 100 mL IVPB      Follow up in about 1 week (around 4/24/2025) for Routine OB F/U or as needed.     Ganga Johns MD  Department OBGYN  [facility]       Received a message from Dr. Justin Sacnhez (anesthesiologist at [facility]).  To summarize:    -impression of her reaction to anesthesia = vagal reaction  -perhaps drop in blood pressure was due to emesis / hypovolemia / pain meds  -not concerned about getting an epidural and/or anesthetics as long as pre-hydrated adequately

## 2025-04-17 NOTE — PATIENT INSTRUCTIONS
To schedule classes (see below for what's offered) at the hospital, call [phone].    Have a question or concern?    PRO TIP: Program these phone numbers in your cell phone!    for an emergency  call 911 or go to the nearest hospital Especially after 20 weeks of the pregnancy, please remember that  Labor & Delivery is at [facility].  There is no L&D at [facility] ([facility]  After hours you can only access L&D through the Emergency Room (entrance on [facility]).   [facility] Nurse Care Advice Line  [phone] At any time during your pregnancy,  you can speak to a nurse 24-7.   for non-urgent issues, send us a  message in [employer] Consider calling the Nurse Care Advice Line if it's a weekend or  toward the end of the work-day since  [employer] and phone messages may not be answered for a day or two.   for non-urgent issues, call the clinic  [phone], Option 3    Labor & Delivery  [phone] Starting at 20 weeks of the pregnancy,  you can speak to a nurse on L&D 24-7.     THIRD TRIMESTER  29+0 - 42 weeks    Adapting to Pregnancy: Third Trimester   Some physical discomforts may seem worse in the final weeks of pregnancy. Simple lifestyle changes can help as you keep good posture and use good body mechanics.  Pay attention to your changing center of gravity.  Be kind to yourself and know your limits - your body is hosting an entire other human!  Ask for help if you need it.  Take fiber supplements, drink lots of water, and avoid prolonged sitting or standing to prevent constipation and hemorrhoids.  Be sure you're getting enough rest: avoid caffeine after 3pm, use a warm bath to relax before bedtime, ask for back/neck/shoulder massages from your partner, try drinking warm decaf tea or milk at bedtime, get heartburn under control.  Speaking of heartburnavoid spicy / acidic / sugary foods, especially in the evenings.  Eat slowly and in small amounts, and avoiding eating during the 2  hours before bedtime.  Try sleeping with your upper body elevated.    Your To-Do List  If you haven't already, get these important things done!  A few new tasks include having the carseat ready, having hospital bags packed, and making arrangements if needed for other children and/or pets while you're in the hospital.    We'll ask you to pre-register at the hospital around 36 weeks so you have a little less paperwork to do when the big day arrives.  You can walk-in at any time.  Go in the hospital's main entrance on [address] (near the pharmacy).  Walk in to Registration, which is across from the Information Desk.  You'll need your ID and insurance cards.    More information on these topics can be found in your OB Welcome Packet and the A-Z Book:  Choose a pediatrician for your baby.  Sign up for a tour and classes at the hospital.  All classes are free of charge if you are delivering at [facility].  Call [phone] to register for any of these classes:  Baby Love (learn about the delivery process and caring for a )  Big Brother / Big Sister Class (to help siblings prepare for baby)  Lamaze (a 4 week class to learn about natural interventions for labor)  Breastfeeding (get a head start learning about breastfeeding)  Work on some methods for coping with the pains of labor.  A good bit of labor happens before you can get an epidural, and you'll feel more confident if you have a plan that you've practiced.  We encourage everyone to breastfeed if they can (and most women can!).  Please ask us questions if you have them.  Decide what you'd like to use for contraception (birth control) after this baby is born.  If you're having a boy, let us know if you plan to have him circumcised.    Things to Look Out For  The Four Questions (please read more about these in your OB Welcome Packet): 1) Baby's Movements, 2) Contractions / Cramping, 3) Vaginal bleeding, 4) Leaking fluids  Mood swings and depression - some mood swings are  expected in pregnancy, but please ask for help if you are feeling overwhelmed or sad all the time.  Headaches that don't improve with rest, drinking water, and tylenol.  Severe swelling, especially of the face and hands. Remember some swelling of the lower legs is normal, especially if you have been on your feet for some time.    Intimacy   Unless your doctor tells you not to, it's still fine to continue having sex. The third trimester though can be a challenge comfort-wise. Try different positions and see what's best for you.    Baby!  Baby kicks and stretches despite running low on room, lanugo disappears, baby gains about a half of a pound per week, and bones harden (except for the skull, which remains soft and flexible for delivery).    OTHER INFORMATION:  If your provider orders labs or other studies, you probably won't hear from us unless something is abnormal.  How we define normal is different for many things in pregnancy.  This includes several of the numbers on a Complete Blood Count (CBC).  If your result is flagged as abnormal in MyChart but you don't hear from us, it's probably because things are actually normal based on where you are in your pregnancy.

## 2025-04-24 ENCOUNTER — HOSPITAL ENCOUNTER (INPATIENT)
Facility: HOSPITAL | Age: 30
LOS: 3 days | Discharge: HOME | End: 2025-04-27
Attending: GENERAL PRACTICE | Admitting: GENERAL PRACTICE
Payer: COMMERCIAL

## 2025-04-24 DIAGNOSIS — Z3A.38 38 WEEKS GESTATION OF PREGNANCY: ICD-10-CM

## 2025-04-24 DIAGNOSIS — O09.90 SUPERVISION OF HIGH RISK PREGNANCY, ANTEPARTUM: ICD-10-CM

## 2025-04-24 DIAGNOSIS — Z34.90 ENCOUNTER FOR ELECTIVE INDUCTION OF LABOR: ICD-10-CM

## 2025-04-24 LAB
ABSOLUTE EOSINOPHIL (SMH): 0.11 K/UL
ABSOLUTE MONOCYTE (SMH): 0.6 K/UL (ref 0.3–1)
ABSOLUTE NEUTROPHIL COUNT (SMH): 5.8 K/UL (ref 1.8–7.7)
AMPHET UR QL SCN: NEGATIVE
BARBITURATE SCN PRESENT UR: NEGATIVE
BASOPHILS # BLD AUTO: 0.03 K/UL
BASOPHILS NFR BLD AUTO: 0.4 %
BENZODIAZ UR QL SCN: NEGATIVE
BUPRENORPHINE UR QL SCN: NEGATIVE
CANNABINOIDS UR QL SCN: NEGATIVE
COCAINE UR QL SCN: NEGATIVE
CREAT UR-MCNC: 109.6 MG/DL (ref 15–325)
ERYTHROCYTE [DISTWIDTH] IN BLOOD BY AUTOMATED COUNT: 12.7 % (ref 11.5–14.5)
GROUP & RH: NORMAL
HCT VFR BLD AUTO: 37.8 % (ref 37–48.5)
HGB BLD-MCNC: 13.1 GM/DL (ref 12–16)
IMM GRANULOCYTES # BLD AUTO: 0.09 K/UL (ref 0–0.04)
IMM GRANULOCYTES NFR BLD AUTO: 1.1 % (ref 0–0.5)
INDIRECT COOMBS: NORMAL
LYMPHOCYTES # BLD AUTO: 1.48 K/UL (ref 1–4.8)
MCH RBC QN AUTO: 31 PG (ref 27–31)
MCHC RBC AUTO-ENTMCNC: 34.7 G/DL (ref 32–36)
MCV RBC AUTO: 90 FL (ref 82–98)
NUCLEATED RBC (/100WBC) (SMH): 0 /100 WBC
OPIATES UR QL SCN: NEGATIVE
PCP UR QL: NEGATIVE
PLATELET # BLD AUTO: 169 K/UL (ref 150–450)
PMV BLD AUTO: 11.1 FL (ref 9.2–12.9)
RBC # BLD AUTO: 4.22 M/UL (ref 4–5.4)
RELATIVE EOSINOPHIL (SMH): 1.4 % (ref 0–8)
RELATIVE LYMPHOCYTE (SMH): 18.3 % (ref 18–48)
RELATIVE MONOCYTE (SMH): 7.4 % (ref 4–15)
RELATIVE NEUTROPHIL (SMH): 71.4 % (ref 38–73)
T PALLIDUM IGG+IGM SER QL: NORMAL
WBC # BLD AUTO: 8.07 K/UL (ref 3.9–12.7)

## 2025-04-24 PROCEDURE — 3E0DXGC INTRODUCTION OF OTHER THERAPEUTIC SUBSTANCE INTO MOUTH AND PHARYNX, EXTERNAL APPROACH: ICD-10-PCS | Performed by: GENERAL PRACTICE

## 2025-04-24 PROCEDURE — 86593 SYPHILIS TEST NON-TREP QUANT: CPT | Performed by: OBSTETRICS & GYNECOLOGY

## 2025-04-24 PROCEDURE — 12000002 HC ACUTE/MED SURGE SEMI-PRIVATE ROOM

## 2025-04-24 PROCEDURE — 85025 COMPLETE CBC W/AUTO DIFF WBC: CPT | Performed by: OBSTETRICS & GYNECOLOGY

## 2025-04-24 PROCEDURE — 80307 DRUG TEST PRSMV CHEM ANLYZR: CPT | Performed by: GENERAL PRACTICE

## 2025-04-24 PROCEDURE — 86901 BLOOD TYPING SEROLOGIC RH(D): CPT | Performed by: OBSTETRICS & GYNECOLOGY

## 2025-04-24 PROCEDURE — 25000003 PHARM REV CODE 250: Performed by: GENERAL PRACTICE

## 2025-04-24 RX ORDER — SIMETHICONE 80 MG
1 TABLET,CHEWABLE ORAL 4 TIMES DAILY PRN
Status: DISCONTINUED | OUTPATIENT
Start: 2025-04-24 | End: 2025-04-25

## 2025-04-24 RX ORDER — TERBUTALINE SULFATE 1 MG/ML
0.25 INJECTION SUBCUTANEOUS
Status: DISCONTINUED | OUTPATIENT
Start: 2025-04-24 | End: 2025-04-25

## 2025-04-24 RX ORDER — SODIUM CHLORIDE, SODIUM LACTATE, POTASSIUM CHLORIDE, CALCIUM CHLORIDE 600; 310; 30; 20 MG/100ML; MG/100ML; MG/100ML; MG/100ML
INJECTION, SOLUTION INTRAVENOUS CONTINUOUS
Status: DISCONTINUED | OUTPATIENT
Start: 2025-04-24 | End: 2025-04-25

## 2025-04-24 RX ORDER — DIPHENOXYLATE HYDROCHLORIDE AND ATROPINE SULFATE 2.5; .025 MG/1; MG/1
2 TABLET ORAL EVERY 6 HOURS PRN
Status: DISCONTINUED | OUTPATIENT
Start: 2025-04-24 | End: 2025-04-25

## 2025-04-24 RX ORDER — OXYTOCIN-SODIUM CHLORIDE 0.9% IV SOLN 30 UNIT/500ML 30-0.9/5 UT/ML-%
10 SOLUTION INTRAVENOUS ONCE AS NEEDED
Status: DISCONTINUED | OUTPATIENT
Start: 2025-04-24 | End: 2025-04-25

## 2025-04-24 RX ORDER — MISOPROSTOL 200 UG/1
800 TABLET ORAL ONCE AS NEEDED
Status: DISCONTINUED | OUTPATIENT
Start: 2025-04-24 | End: 2025-04-25

## 2025-04-24 RX ORDER — MISOPROSTOL 100 MCG
25 TABLET ORAL ONCE
Status: DISCONTINUED | OUTPATIENT
Start: 2025-04-24 | End: 2025-04-24

## 2025-04-24 RX ORDER — MUPIROCIN 20 MG/G
OINTMENT TOPICAL
Status: DISCONTINUED | OUTPATIENT
Start: 2025-04-24 | End: 2025-04-25

## 2025-04-24 RX ORDER — ONDANSETRON 4 MG/1
8 TABLET, ORALLY DISINTEGRATING ORAL EVERY 8 HOURS PRN
Status: DISCONTINUED | OUTPATIENT
Start: 2025-04-24 | End: 2025-04-25

## 2025-04-24 RX ORDER — CALCIUM CARBONATE 200(500)MG
500 TABLET,CHEWABLE ORAL 3 TIMES DAILY PRN
Status: DISCONTINUED | OUTPATIENT
Start: 2025-04-24 | End: 2025-04-25

## 2025-04-24 RX ORDER — MISOPROSTOL 100 MCG
25 TABLET ORAL EVERY 6 HOURS
Status: DISCONTINUED | OUTPATIENT
Start: 2025-04-24 | End: 2025-04-25

## 2025-04-24 RX ORDER — CARBOPROST TROMETHAMINE 250 UG/ML
250 INJECTION, SOLUTION INTRAMUSCULAR
Status: DISCONTINUED | OUTPATIENT
Start: 2025-04-24 | End: 2025-04-25

## 2025-04-24 RX ORDER — OXYTOCIN 10 [USP'U]/ML
10 INJECTION, SOLUTION INTRAMUSCULAR; INTRAVENOUS ONCE AS NEEDED
Status: DISCONTINUED | OUTPATIENT
Start: 2025-04-24 | End: 2025-04-25

## 2025-04-24 RX ORDER — OXYTOCIN-SODIUM CHLORIDE 0.9% IV SOLN 30 UNIT/500ML 30-0.9/5 UT/ML-%
95 SOLUTION INTRAVENOUS CONTINUOUS PRN
Status: DISCONTINUED | OUTPATIENT
Start: 2025-04-24 | End: 2025-04-27 | Stop reason: HOSPADM

## 2025-04-24 RX ORDER — MISOPROSTOL 100 MCG
25 TABLET ORAL EVERY 6 HOURS
Status: DISCONTINUED | OUTPATIENT
Start: 2025-04-25 | End: 2025-04-24

## 2025-04-24 RX ORDER — TRANEXAMIC ACID 10 MG/ML
1000 INJECTION, SOLUTION INTRAVENOUS EVERY 30 MIN PRN
Status: DISCONTINUED | OUTPATIENT
Start: 2025-04-24 | End: 2025-04-25

## 2025-04-24 RX ORDER — METHYLERGONOVINE MALEATE 0.2 MG/ML
200 INJECTION INTRAVENOUS ONCE AS NEEDED
Status: COMPLETED | OUTPATIENT
Start: 2025-04-24 | End: 2025-04-25

## 2025-04-24 RX ORDER — OXYTOCIN-SODIUM CHLORIDE 0.9% IV SOLN 30 UNIT/500ML 30-0.9/5 UT/ML-%
30 SOLUTION INTRAVENOUS ONCE AS NEEDED
Status: DISCONTINUED | OUTPATIENT
Start: 2025-04-24 | End: 2025-04-25

## 2025-04-24 RX ORDER — OXYTOCIN-SODIUM CHLORIDE 0.9% IV SOLN 30 UNIT/500ML 30-0.9/5 UT/ML-%
95 SOLUTION INTRAVENOUS ONCE AS NEEDED
Status: DISCONTINUED | OUTPATIENT
Start: 2025-04-24 | End: 2025-04-27 | Stop reason: HOSPADM

## 2025-04-24 RX ORDER — LIDOCAINE HYDROCHLORIDE 10 MG/ML
10 INJECTION, SOLUTION INFILTRATION; PERINEURAL ONCE AS NEEDED
Status: DISCONTINUED | OUTPATIENT
Start: 2025-04-24 | End: 2025-04-25

## 2025-04-24 RX ADMIN — MISOPROSTOL 25 MCG: 100 TABLET ORAL at 08:04

## 2025-04-25 ENCOUNTER — ANESTHESIA EVENT (OUTPATIENT)
Dept: OBSTETRICS AND GYNECOLOGY | Facility: HOSPITAL | Age: 30
End: 2025-04-25
Payer: COMMERCIAL

## 2025-04-25 ENCOUNTER — ANESTHESIA (OUTPATIENT)
Dept: OBSTETRICS AND GYNECOLOGY | Facility: HOSPITAL | Age: 30
End: 2025-04-25
Payer: COMMERCIAL

## 2025-04-25 PROBLEM — O09.90 SUPERVISION OF HIGH RISK PREGNANCY, ANTEPARTUM: Status: ACTIVE | Noted: 2025-04-25

## 2025-04-25 PROCEDURE — 72100003 HC LABOR CARE, EA. ADDL. 8 HRS

## 2025-04-25 PROCEDURE — 0KQM0ZZ REPAIR PERINEUM MUSCLE, OPEN APPROACH: ICD-10-PCS | Performed by: GENERAL PRACTICE

## 2025-04-25 PROCEDURE — 10907ZC DRAINAGE OF AMNIOTIC FLUID, THERAPEUTIC FROM PRODUCTS OF CONCEPTION, VIA NATURAL OR ARTIFICIAL OPENING: ICD-10-PCS | Performed by: GENERAL PRACTICE

## 2025-04-25 PROCEDURE — 99223 1ST HOSP IP/OBS HIGH 75: CPT | Mod: ,,, | Performed by: GENERAL PRACTICE

## 2025-04-25 PROCEDURE — 72100002 HC LABOR CARE, 1ST 8 HOURS

## 2025-04-25 PROCEDURE — 63600175 PHARM REV CODE 636 W HCPCS: Performed by: OBSTETRICS & GYNECOLOGY

## 2025-04-25 PROCEDURE — 62326 NJX INTERLAMINAR LMBR/SAC: CPT | Performed by: ANESTHESIOLOGY

## 2025-04-25 PROCEDURE — 63600175 PHARM REV CODE 636 W HCPCS: Performed by: GENERAL PRACTICE

## 2025-04-25 PROCEDURE — 63600175 PHARM REV CODE 636 W HCPCS: Performed by: ANESTHESIOLOGY

## 2025-04-25 PROCEDURE — 25000003 PHARM REV CODE 250: Performed by: GENERAL PRACTICE

## 2025-04-25 PROCEDURE — C1751 CATH, INF, PER/CENT/MIDLINE: HCPCS | Performed by: ANESTHESIOLOGY

## 2025-04-25 PROCEDURE — 27200710 HC EPIDURAL INFUSION PUMP SET: Performed by: ANESTHESIOLOGY

## 2025-04-25 PROCEDURE — 12000002 HC ACUTE/MED SURGE SEMI-PRIVATE ROOM

## 2025-04-25 PROCEDURE — 72200006 HC VAGINAL DELIVERY LEVEL III

## 2025-04-25 PROCEDURE — 59409 OBSTETRICAL CARE: CPT | Mod: ,,, | Performed by: ANESTHESIOLOGY

## 2025-04-25 PROCEDURE — 51702 INSERT TEMP BLADDER CATH: CPT

## 2025-04-25 PROCEDURE — 59400 OBSTETRICAL CARE: CPT | Mod: GB,,, | Performed by: GENERAL PRACTICE

## 2025-04-25 PROCEDURE — 25000003 PHARM REV CODE 250: Performed by: OBSTETRICS & GYNECOLOGY

## 2025-04-25 RX ORDER — FENTANYL/BUPIVACAINE/NS/PF 2MCG/ML-.1
PLASTIC BAG, INJECTION (ML) INJECTION CONTINUOUS
Refills: 0 | Status: DISCONTINUED | OUTPATIENT
Start: 2025-04-25 | End: 2025-04-27 | Stop reason: HOSPADM

## 2025-04-25 RX ORDER — CARBOPROST TROMETHAMINE 250 UG/ML
250 INJECTION, SOLUTION INTRAMUSCULAR
Status: DISCONTINUED | OUTPATIENT
Start: 2025-04-25 | End: 2025-04-27 | Stop reason: HOSPADM

## 2025-04-25 RX ORDER — HYDROCORTISONE 25 MG/G
CREAM TOPICAL 3 TIMES DAILY PRN
Status: DISCONTINUED | OUTPATIENT
Start: 2025-04-25 | End: 2025-04-27 | Stop reason: HOSPADM

## 2025-04-25 RX ORDER — METHYLERGONOVINE MALEATE 0.2 MG/ML
200 INJECTION INTRAVENOUS ONCE AS NEEDED
Status: DISCONTINUED | OUTPATIENT
Start: 2025-04-25 | End: 2025-04-27 | Stop reason: HOSPADM

## 2025-04-25 RX ORDER — MISOPROSTOL 200 UG/1
800 TABLET ORAL ONCE AS NEEDED
Status: DISCONTINUED | OUTPATIENT
Start: 2025-04-25 | End: 2025-04-27 | Stop reason: HOSPADM

## 2025-04-25 RX ORDER — IBUPROFEN 400 MG/1
800 TABLET ORAL EVERY 8 HOURS PRN
Status: DISCONTINUED | OUTPATIENT
Start: 2025-04-25 | End: 2025-04-27 | Stop reason: HOSPADM

## 2025-04-25 RX ORDER — ONDANSETRON HYDROCHLORIDE 2 MG/ML
4 INJECTION, SOLUTION INTRAVENOUS EVERY 6 HOURS PRN
Status: DISCONTINUED | OUTPATIENT
Start: 2025-04-25 | End: 2025-04-25

## 2025-04-25 RX ORDER — OXYTOCIN-SODIUM CHLORIDE 0.9% IV SOLN 30 UNIT/500ML 30-0.9/5 UT/ML-%
95 SOLUTION INTRAVENOUS ONCE AS NEEDED
Status: DISCONTINUED | OUTPATIENT
Start: 2025-04-25 | End: 2025-04-27 | Stop reason: HOSPADM

## 2025-04-25 RX ORDER — NALOXONE HCL 0.4 MG/ML
0.4 VIAL (ML) INJECTION SEE ADMIN INSTRUCTIONS
Status: DISCONTINUED | OUTPATIENT
Start: 2025-04-25 | End: 2025-04-25

## 2025-04-25 RX ORDER — OXYTOCIN-SODIUM CHLORIDE 0.9% IV SOLN 30 UNIT/500ML 30-0.9/5 UT/ML-%
95 SOLUTION INTRAVENOUS CONTINUOUS PRN
Status: DISCONTINUED | OUTPATIENT
Start: 2025-04-25 | End: 2025-04-27 | Stop reason: HOSPADM

## 2025-04-25 RX ORDER — ONDANSETRON 4 MG/1
8 TABLET, ORALLY DISINTEGRATING ORAL EVERY 8 HOURS PRN
Status: DISCONTINUED | OUTPATIENT
Start: 2025-04-25 | End: 2025-04-27 | Stop reason: HOSPADM

## 2025-04-25 RX ORDER — SODIUM CHLORIDE 0.9 % (FLUSH) 0.9 %
10 SYRINGE (ML) INJECTION
Status: DISCONTINUED | OUTPATIENT
Start: 2025-04-25 | End: 2025-04-27 | Stop reason: HOSPADM

## 2025-04-25 RX ORDER — TRANEXAMIC ACID 10 MG/ML
1000 INJECTION, SOLUTION INTRAVENOUS EVERY 30 MIN PRN
Status: DISCONTINUED | OUTPATIENT
Start: 2025-04-25 | End: 2025-04-27 | Stop reason: HOSPADM

## 2025-04-25 RX ORDER — OXYTOCIN-SODIUM CHLORIDE 0.9% IV SOLN 30 UNIT/500ML 30-0.9/5 UT/ML-%
0-30 SOLUTION INTRAVENOUS CONTINUOUS
Status: DISCONTINUED | OUTPATIENT
Start: 2025-04-25 | End: 2025-04-25

## 2025-04-25 RX ORDER — OXYTOCIN 10 [USP'U]/ML
10 INJECTION, SOLUTION INTRAMUSCULAR; INTRAVENOUS ONCE AS NEEDED
Status: DISCONTINUED | OUTPATIENT
Start: 2025-04-25 | End: 2025-04-27 | Stop reason: HOSPADM

## 2025-04-25 RX ORDER — DIPHENOXYLATE HYDROCHLORIDE AND ATROPINE SULFATE 2.5; .025 MG/1; MG/1
2 TABLET ORAL EVERY 6 HOURS PRN
Status: DISCONTINUED | OUTPATIENT
Start: 2025-04-25 | End: 2025-04-27 | Stop reason: HOSPADM

## 2025-04-25 RX ORDER — ROPIVACAINE HYDROCHLORIDE 2 MG/ML
20 INJECTION, SOLUTION EPIDURAL; INFILTRATION ONCE AS NEEDED
Status: COMPLETED | OUTPATIENT
Start: 2025-04-25 | End: 2025-04-25

## 2025-04-25 RX ORDER — EPHEDRINE SULFATE 50 MG/ML
10 INJECTION, SOLUTION INTRAVENOUS ONCE AS NEEDED
Status: DISCONTINUED | OUTPATIENT
Start: 2025-04-25 | End: 2025-04-27 | Stop reason: HOSPADM

## 2025-04-25 RX ORDER — HYDROCODONE BITARTRATE AND ACETAMINOPHEN 5; 325 MG/1; MG/1
1 TABLET ORAL EVERY 4 HOURS PRN
Status: DISCONTINUED | OUTPATIENT
Start: 2025-04-25 | End: 2025-04-27 | Stop reason: HOSPADM

## 2025-04-25 RX ORDER — PRENATAL WITH FERROUS FUM AND FOLIC ACID 3080; 920; 120; 400; 22; 1.84; 3; 20; 10; 1; 12; 200; 27; 25; 2 [IU]/1; [IU]/1; MG/1; [IU]/1; MG/1; MG/1; MG/1; MG/1; MG/1; MG/1; UG/1; MG/1; MG/1; MG/1; MG/1
1 TABLET ORAL DAILY
Status: DISCONTINUED | OUTPATIENT
Start: 2025-04-26 | End: 2025-04-27 | Stop reason: HOSPADM

## 2025-04-25 RX ORDER — FENTANYL/BUPIVACAINE/NS/PF 2MCG/ML-.1
14 PLASTIC BAG, INJECTION (ML) INJECTION CONTINUOUS
Refills: 0 | Status: DISCONTINUED | OUTPATIENT
Start: 2025-04-25 | End: 2025-04-27 | Stop reason: HOSPADM

## 2025-04-25 RX ORDER — DIPHENHYDRAMINE HCL 25 MG
25 CAPSULE ORAL EVERY 4 HOURS PRN
Status: DISCONTINUED | OUTPATIENT
Start: 2025-04-25 | End: 2025-04-27 | Stop reason: HOSPADM

## 2025-04-25 RX ORDER — AMOXICILLIN 250 MG
1 CAPSULE ORAL DAILY PRN
Status: DISCONTINUED | OUTPATIENT
Start: 2025-04-25 | End: 2025-04-27 | Stop reason: HOSPADM

## 2025-04-25 RX ORDER — DIPHENHYDRAMINE HYDROCHLORIDE 50 MG/ML
12.5 INJECTION, SOLUTION INTRAMUSCULAR; INTRAVENOUS EVERY 4 HOURS PRN
Status: DISCONTINUED | OUTPATIENT
Start: 2025-04-25 | End: 2025-04-25

## 2025-04-25 RX ORDER — EPHEDRINE SULFATE 50 MG/ML
10 INJECTION, SOLUTION INTRAVENOUS ONCE
Status: DISCONTINUED | OUTPATIENT
Start: 2025-04-25 | End: 2025-04-27 | Stop reason: HOSPADM

## 2025-04-25 RX ADMIN — SODIUM CHLORIDE, POTASSIUM CHLORIDE, SODIUM LACTATE AND CALCIUM CHLORIDE 1000 ML: 600; 310; 30; 20 INJECTION, SOLUTION INTRAVENOUS at 06:04

## 2025-04-25 RX ADMIN — IBUPROFEN 800 MG: 400 TABLET ORAL at 08:04

## 2025-04-25 RX ADMIN — Medication 2 MILLI-UNITS/MIN: at 01:04

## 2025-04-25 RX ADMIN — PENICILLIN G POTASSIUM 3 MILLION UNITS: 5000000 INJECTION, POWDER, FOR SOLUTION INTRAMUSCULAR; INTRAVENOUS at 01:04

## 2025-04-25 RX ADMIN — PENICILLIN G POTASSIUM 5 MILLION UNITS: 5000000 INJECTION, POWDER, FOR SOLUTION INTRAMUSCULAR; INTRAVENOUS at 01:04

## 2025-04-25 RX ADMIN — SODIUM CHLORIDE, POTASSIUM CHLORIDE, SODIUM LACTATE AND CALCIUM CHLORIDE: 600; 310; 30; 20 INJECTION, SOLUTION INTRAVENOUS at 12:04

## 2025-04-25 RX ADMIN — SODIUM CHLORIDE, POTASSIUM CHLORIDE, SODIUM LACTATE AND CALCIUM CHLORIDE 500 ML: 600; 310; 30; 20 INJECTION, SOLUTION INTRAVENOUS at 07:04

## 2025-04-25 RX ADMIN — ONDANSETRON 4 MG: 2 INJECTION INTRAMUSCULAR; INTRAVENOUS at 12:04

## 2025-04-25 RX ADMIN — METHYLERGONOVINE MALEATE 200 MCG: 0.2 INJECTION, SOLUTION INTRAMUSCULAR; INTRAVENOUS at 03:04

## 2025-04-25 RX ADMIN — SODIUM CHLORIDE, POTASSIUM CHLORIDE, SODIUM LACTATE AND CALCIUM CHLORIDE: 600; 310; 30; 20 INJECTION, SOLUTION INTRAVENOUS at 07:04

## 2025-04-25 RX ADMIN — PENICILLIN G POTASSIUM 3 MILLION UNITS: 5000000 INJECTION, POWDER, FOR SOLUTION INTRAMUSCULAR; INTRAVENOUS at 09:04

## 2025-04-25 RX ADMIN — Medication 95 MILLI-UNITS/MIN: at 03:04

## 2025-04-25 RX ADMIN — ROPIVACAINE HYDROCHLORIDE 5 ML: 2 INJECTION EPIDURAL; INFILTRATION; PERINEURAL at 07:04

## 2025-04-25 RX ADMIN — PENICILLIN G POTASSIUM 3 MILLION UNITS: 5000000 INJECTION, POWDER, FOR SOLUTION INTRAMUSCULAR; INTRAVENOUS at 05:04

## 2025-04-25 RX ADMIN — Medication 12 ML/HR: at 07:04

## 2025-04-25 RX ADMIN — SODIUM CHLORIDE, POTASSIUM CHLORIDE, SODIUM LACTATE AND CALCIUM CHLORIDE: 600; 310; 30; 20 INJECTION, SOLUTION INTRAVENOUS at 11:04

## 2025-04-25 NOTE — ANESTHESIA PROCEDURE NOTES
Epidural    Patient location during procedure: OB   Reason for block: primary anesthetic   Reason for block: labor analgesia requested by patient and obstetrician  Diagnosis: IUP   Start time: 4/25/2025 6:55 AM  Timeout: 4/25/2025 6:55 AM  End time: 4/25/2025 7:10 AM    Staffing  Performing Provider: Ernie Paulino MD  Authorizing Provider: Ernie Paulino MD    Staffing  Performed by: Ernie Paulino MD  Authorized by: Ernie Paulino MD        Preanesthetic Checklist  Completed: patient identified, IV checked, site marked, risks and benefits discussed, surgical consent, monitors and equipment checked, pre-op evaluation, timeout performed, anesthesia consent given, hand hygiene performed and patient being monitored  Preparation  Patient position: sitting  Prep: Betadine  Patient monitoring: ECG and Blood Pressure  Reason for block: primary anesthetic   Epidural  Skin Anesthetic: lidocaine 1%  Skin Wheal: 3 mL  Administration type: continuous  Approach: midline  Interspace: L3-4    Injection technique: SABRINA air  Needle and Epidural Catheter  Needle type: Tuohy   Needle gauge: 17  Needle length: 3.5 inches  Needle insertion depth: 5 cm  Catheter type: springwound and multi-orifice  Catheter size: 19 G  Catheter at skin depth: 10 cm  Insertion Attempts: 1  Test dose: 5 mL of lidocaine 1.5% with Epi 1-to-200,000  Additional Documentation: incremental injection, no paresthesia on injection, no significant pain on injection, negative aspiration for heme and CSF, no signs/symptoms of IV or SA injection and no significant complaints from patient  Needle localization: anatomical landmarks  Assessment  Ease of block: easy  Patient's tolerance of the procedure: comfortable throughout block and no complaints No inadvertent dural puncture with Tuohy.  Dural puncture not performed with spinal needle

## 2025-04-25 NOTE — LACTATION NOTE
04/25/25 1718   Maternal Assessment   Breast Density Bilateral:;soft   Areola Bilateral:;elastic   Nipples Bilateral:;everted   Maternal Infant Feeding   Maternal Emotional State assist needed   Infant Positioning clutch/football   Signs of Milk Transfer audible swallow;infant jaw motion present   Pain with Feeding no   Comfort Measures Before/During Feeding infant position adjusted;latch adjusted;maternal position adjusted   Latch Assistance yes     Assisted to latch baby to right breast in football position. Baby latched briefly, then pushed nipple out of mouth with back of tongue. Able to latch deeply, on and off, nursing well with audible swallows. Mother denies pain during feeding. Reviewed basic breastfeeding instructions and encouraged to call me for any further breastfeeding assistance. Patient verbalizes understanding of all instructions with good recall.    Instructed on proper latch to facilitate effective breastfeeding.  Discussed recognizing hunger cues, appropriate positioning and wide mouth latch.  Discussed ways to determine an effective latch including:  areola included in latch, rhythmic/nutritive sucking and audible swallowing.  Also discussed soreness/tenderness associated with latch and prevention and treatment.  Pt states understanding and verbalized appropriate recall.

## 2025-04-25 NOTE — H&P
"  Kandi Gonzáles  29 y.o. G1 MRN  [id number] PATIENT   1995   FINAL EDC:   2025  by IUI date 8/3/24    Baby: girl!  "Reba Aquino"    Spouse Name: Paris ALLERGIES:    COVID Vaccine  NKDA      GRBSTREPPCR Positive (A) 2025    OB PROBLEM LIST:    Rh negative    Obesity, BMI 31, normal baseline PCR & HbA1C    GETA delayed reaction (syncope, bradycardia 40's, was 2 days post-op from [facility]), saw Dr. Sanchez who said epidural/anesthesia okay but stressed pre-hydration (message at bottom of this note)       TO-DO THROUGHOUT PREGNANCY:  Depression Screen: 2024   Rhogam: given 2/3/2025 @ 28wks  Flu Shot: OCT 2024  TDAP: 2025   Infant feeding: breast   Plans for epidural: yes  Classes at hospital: offered  PP contraception: SSSP  Delivery Consent: 2025   Pediatrician: Dr. Springer MEDICATIONS:    PNV  ASA 81mg (start @ 15+5wks)  Famotidine (adding 21w6d )   HISTORY AND PHYSICAL  2025    SUBJECTIVE   Kandi Gonzáles is a 29 y.o.  who presented to L&D on 2025 at 39w5d for a scheduled elective IOL.  She received cytotec 25mcg po at 2000hrs and was started on pitocin at 0145hrs with cervix 4cm.  Has been receiving PCN for GBS positive status.  She currently is moderately uncomfortable.  Plans an epidural.  No questions or concerns.  Agrees to amniotomy.    OBJECTIVE   Vital Signs (Most Recent):  Temp: 98 °F (36.7 °C) (25 0047)  Pulse: (!) 59 (25 0200)  Resp: 18 (25 1735)  BP: 118/65 (25 0149)  SpO2: 95 % (25 0158) Vital Signs (24h Range):  Temp:  [98 °F (36.7 °C)-98.1 °F (36.7 °C)] 98 °F (36.7 °C)  Pulse:  [] 59  Resp:  [18] 18  SpO2:  [16 %-99 %] 95 %  BP: (115-125)/(65-79) 118/65     GEN = alert/oriented, nad, sitting up in bed  HEENT = sclera anicteric, EOM grossly normal  BREASTS = deferred, no concerns  CV = BP and HR as per vitals  PULM = normal respiratory effort  ABD = soft, gravid, nontender, nondistended, EFW >8lbs by " palpation   =     External: nefg, no lesions     Cervix: 4/50/-3/medium/posterior, cephalic presentation, pelvis adequate for CAMRYN --> amniotomy performed with copious return of clear fluid    FHR: 130bpm baseline, moderate variability, spont accels, no decels  TOCO: q2-4, pitocin at 6mU/min    ADMISSION LABS:  Recent Labs   Lab 25  1748   WBC 8.07   HGB 13.1   HCT 37.8      MCV 90     UDS neg    ASSESSMENT / PLAN  29 y.o.  at 39w6d admitted for elective IOL.  Latent labor.  Reassuring fetal and maternal status.    Informed consent previously obtained for delivery.  Antibiotics: PCN for GBS prophy  Continue to titrate pitocin as indicated / tolerated  May have epidural when desired    Cristin Frazier MD       LABS   INITIAL LABS:  @ 14wks  Lab Results   Component Value Date    GROUPTRH B NEG 10/25/2024    INDIRECTCOOM NEG 10/25/2024      Lab Results   Component Value Date    WBC 8.10 10/25/2024    HGB 12.8 10/25/2024    HCT 36.8 (L) 10/25/2024     10/25/2024    MCV 89 10/25/2024      Lab Results   Component Value Date    HEPBSAG Non-reactive 10/25/2024    HEPCAB Non-reactive 10/25/2024    TREPABIGMIGG Nonreactive 10/25/2024    AUR29RAGO Non-reactive 10/25/2024    RUBELLAIMMUN Reactive 10/25/2024     Lab Results   Component Value Date    VARICELLAZOS 616.00 10/25/2024    VARICELLAINT Positive 10/25/2024     Lab Results   Component Value Date    HGBELECTINTE Normal 10/25/2024     Lab Results   Component Value Date    LABURIN No significant growth 10/25/2024     Lab Results   Component Value Date    CREATININE 0.7 10/25/2024    AST 19 10/25/2024    ALT 18 10/25/2024    BILITOT 0.6 10/25/2024    UTPCR 0.07 10/25/2024      Lab Results   Component Value Date    HGBA1C 4.9 10/25/2024     CT/NG recently neg with [facility] OTHER LABS:  Lab Results   Component Value Date    XCL71J09 Negative 2024    T18 Negative 2024    OMF50Z43 Negative 2024    FETALSEXRES female 2024  "    CARRIER SCREEN (Inher[facility]): negative for CF, SMA, Fragile X / Date: 11/7/2024      WBC 8.96 01/31/2025    HGB 12.1 01/31/2025    HCT 35.1 (L) 01/31/2025     01/31/2025    MCV 93 01/31/2025    FERRITIN 35 01/31/2025     Lab Results   Component Value Date    OBGLUCOSESCR 174 (H) 01/31/2025     Lab Results   Component Value Date    TREPABIGMIGG Nonreactive 01/31/2025     Lab Results   Component Value Date    GROUPTRH B NEG 01/31/2025    INDIRECTCOOM NEG 01/31/2025     Lab Results   Component Value Date    GLUCFAST 87 02/07/2025    VFXL0BK 172 02/07/2025    HLOT6NP 148 02/07/2025    BPQI5PD 113 02/07/2025       ULTRASOUNDS   ANATOMY SCAN:  DATE: 12/19/24 @ 21+2wks:  SIZE = DATES  ANATOMY: wnl but incomplete  PLACENTA: anterior  CERVIX: normal length  OTHER: fibroid uterus (anterior subserosal [id number])     DATE: 1/24/25 @ 27+2wks:  SIZE = DATES  ANATOMY: anatomy survey completed and wnl (profile "suboptimal")     GROWTH US (3/27/25 @ 35+5wks):  EFW 2768g = 52 percentile   HISTORY   MEDICAL HISTORY:    Pre-pregnancy BMI = 31  GERD  GETA delayed reaction (syncope, bradycardia 40's, was 2 days post-op from [facility]) SURGICAL HISTORY:    Cholecystectomy  Appendectomy  Ganglion cyst excision  Luttrell tooth extraction       OBSTETRIC HISTORY   Month/Year Mode of Delivery EGA Wt. M/F Epidural Complications / Comments   G1                    SOCIAL HISTORY:    Smoker: non-smoker  Alcohol: denies  Drugs: denies  Relationship: same sex partner and IUI  Domestic Violence: no  Lives with: wife /  SEP 2022  Education Level: Some College  Occupation: [employer]  Christianity: n/a GYN HISTORY:    PAP'S: no h/o abnormals  LAST PAP: 11/13/2024: PAP neg   STD Hx: no past history  GENITAL HSV: no FAMILY HISTORY:    HTN: no  DIABETES: no  BLEEDING D/O: no  CLOTTING D/O: no  BIRTH DEFECTS: no  MENTAL DISABILITY: no (Autism in sibling)  GYN CANCER: no        Received a message from Dr. Justin Sanchez (anesthesiologist at " [facility]).  To summarize:   -impression of her reaction to anesthesia = vagal reaction  -perhaps drop in blood pressure was due to emesis / hypovolemia / pain meds  -not concerned about getting an epidural and/or anesthetics as long as pre-hydrated adequately

## 2025-04-25 NOTE — NURSING
"[facility]  Department of OBGYN  OB Summary        PATIENT NAME: Kandi Gonzáles                                                                                                                                                                       AGE: 29 y.o.                                                                                          MRN: [id number]  PATIENT LOCATION:  LDR2/LDR2-A  ADMIT DATE: 2025  TODAY'S DATE: 2025 Hospital Day: 2  JOSELINE: Estimated Date of Delivery: 25  GA: 39w6d     OB HISTORY:    OB History    Para Term  AB Living   1 1 1   1   SAB IAB Ectopic Multiple Live Births      0 1      # Outcome Date GA Lbr Matt/2nd Weight Sex Type Anes PTL Lv   1 Term 25 39w6d 07:51 / 01:02 4.115 kg (9 lb 1.2 oz) F Vag-Spont EPI N ELISE      Complications: Shoulder Dystocia       PRE-PROCEDURE DIAGNOSIS: Supervision of high risk pregnancy, antepartum    PROCEDURE:   * No surgery found *       MATERNAL LABS   Lab Results   Component Value Date    GROUPTRH B NEG 2025    HGB 13.1 2025    HCT 37.8 2025     2025    RUBELLAIMMUN Reactive 10/25/2024    HEPBSAG Non-reactive 10/25/2024    VJH34TLHP Non-reactive 10/25/2024    OBGLUCOSESCR 174 (H) 2025       No results found for: "PCDSUFENTANY"    THC   Date Value Ref Range Status   2025 Negative Negative Final     BUPRENORPHINE   Date Value Ref Range Status   2025 Negative Negative Final     Comment:     Buprenorphine urine screening threshold: 5 ng/mL.    This report is intended for use in clinical monitoring and management of patients only.        SPECIMENS  Specimen (24h ago, onward)      None             Antibiotics (From admission, onward)      None            INPATIENT MEDICATIONS  Current Medications[1]    OUTPATIENT MEDICATIONS  Current Outpatient Medications   Medication Instructions    aspirin (ECOTRIN) 81 mg, Oral, Daily    famotidine (PEPCID) 20 mg, Oral, " "2 times daily    PNV 29-IRON-FOLIC-DHA-FISH OIL ORAL Take by mouth.       VITAL SIGNS (Most Recent)  Temp: 98.2 °F (36.8 °C) (25 1555)  Pulse: 81 (25 1610)  Resp: 17 (25 161)  BP: (!) 107/57 (25 1610)  SpO2: (!) 89 % (25 1610)  Temp (36hrs), Av.1 °F (36.7 °C), Min:97.5 °F (36.4 °C), Max:98.6 °F (37 °C)      Delivery Information for Jo Ann Gonzáles    Birth information:  YOB: 2025   Time of birth: 3:11 PM   Sex: female   Head Delivery Date/Time: 2025  3:11 PM   Delivery type: Vaginal, Spontaneous   Gestational Age: 39w6d       Delivery Providers    Delivering clinician: Cristin Frazier MD   Provider Role    Adam Gaitan RN Registered Nurse    Sonya Leos RN Registered Nurse    Chelsie Peres RN Registered Nurse    Letha Diaz,  Scrub Person    Marco Antonio Elmore RN Registered Nurse              Measurements    Weight: 4115 g  Weight (lbs): 9 lb 1.2 oz  Length: 54 cm  Length (in): 21.25"         Apgars    Living status: Living  Apgar Component Scores:  1 min.:  5 min.:  10 min.:  15 min.:  20 min.:    Skin color:  0  1       Heart rate:  2  2       Reflex irritability:  2  2       Muscle tone:  2  2       Respiratory effort:  2  2       Total:  8  9       Apgars assigned by: MARCO ANTONIO ELMORE RN         Operative Delivery    Forceps attempted?: No  Vacuum extractor attempted?: No         Shoulder Dystocia    Shoulder dystocia present?: Yes  Anterior shoulder: right  Time recognized: 2025 15:11:00  Gentle attempt at traction, assisted by maternal expulsive forces?: Yes  First maneuver: Ghislaine maneuver  First maneuver performed at: 2025 15:11:10  First maneuver performed by: CANDE Medina/MEKA Monae  Second maneuver: suprapubic pressure  Second maneuver performed at: 2025 15:11:15  Second maneuver performed by: CANDE Medina           Presentation    Presentation: Vertex  Position: Left Occiput Anterior           Interventions/Resuscitation "    Method: Bulb Suctioning, Tactile Stimulation       Cord    Vessels: 3 vessels  Complications: None  Delayed Cord Clamping?: Yes  Cord Clamped Date/Time: 2025  3:12 PM  Cord Blood Disposition: Sent with Baby  Gases Sent?: No  Stem Cell Collection (by MD): No       Placenta    Placenta delivery date/time: 2025 15:21  Placenta removal: Spontaneous  Placenta appearance: Intact  Placenta disposition: Discarded           Labor Events:       labor: No     Labor Onset Date/Time: 2025 06:18     Dilation Complete Date/Time: 2025 14:09     Start Pushing Date/Time: 2025 14:46     Rupture Date/Time: 25        Rupture type: ARM (Artificial Rupture)        Fluid Amount:       Fluid Color: Clear      steroids: None     Antibiotics given for GBS: Yes     Induction: misoprostol     Indications for induction:  Elective     Augmentation: oxytocin;amniotomy     Indications for augmentation: Ineffective Contraction Pattern     Labor complications: Shoulder Dystocia     Additional complications:          Cervical ripening:                     Delivery:      Episiotomy: None     Indication for Episiotomy:       Perineal Lacerations: 2nd Repaired:  Yes   Periurethral Laceration:   Repaired:     Labial Laceration: right Repaired: Yes   Sulcus Laceration:   Repaired:     Vaginal Laceration:   Repaired:     Cervical Laceration:   Repaired:     Repair suture:       Repair # of packets: 3     Last Value - EBL - Nursing (mL):       Sum - EBL - Nursing (mL): 0     Last Value - EBL - Anesthesia (mL):      Calculated QBL (mL): 354     Running total QBL (mL): 696     Vaginal Sweep Performed: Yes     Surgicount Correct: Yes       Other providers:       Anesthesia    Method: Epidural          Details (if applicable):  Trial of Labor      Categorization:      Priority:     Indications for :     Incision Type:       Additional  information:  Forceps:    Vacuum:     Breech:    Observed anomalies    Other (Comments):           Time ruptured: 8h 53m    SKIN TO SKIN                INFANT FEEDING       PAST MEDICAL HISTORY   Past Medical History:   Diagnosis Date    GERD (gastroesophageal reflux disease)         PAST SURGICAL HISTORY    Past Surgical History:   Procedure Laterality Date    APPENDECTOMY      [id number]    GANGLION CYST EXCISION      LAPAROSCOPIC CHOLECYSTECTOMY N/A 04/25/2024    Procedure: CHOLECYSTECTOMY, LAPAROSCOPIC;  Surgeon: Nathen Truong MD;  Location: [facility];  Service: General;  Laterality: N/A;    WISDOM TOOTH EXTRACTION          SOCIAL HISTORY    Social History[2]                  Adam Gaitan RN  Date of Service: 04/25/2025  4:59 PM        [1]   Current Facility-Administered Medications   Medication Dose Route Frequency Provider Last Rate Last Admin    ePHEDrine sulfate 10 mg  10 mg Intravenous Once Weeks, Cristin ABREU MD        ePHEDrine sulfate 10 mg  10 mg Intravenous Once PRN Ernie Paulino MD        fentanyl 2 mcg/mL with BUPivacaine 0.1% in sodium chloride 0.9% Epidural   Epidural Continuous Weeks, Cristin ABREU MD 12 mL/hr at 04/25/25 0710 12 mL/hr at 04/25/25 0710    fentanyl 2 mcg/mL with BUPivacaine 0.1% in sodium chloride 0.9% Epidural  14 mL/hr Epidural Continuous Ernie Paulino MD        oxytocin 30 units/500 mL (60 milliunits/mL) in 0.9% NaCl (non-titrating)  95 katerina-units/min Intravenous Continuous PRN Ganga Johns MD 95 mL/hr at 04/25/25 1519 95 katerina-units/min at 04/25/25 1519    oxytocin 30 units/500 mL (60 milliunits/mL) in 0.9% NaCl (non-titrating)  95 katerina-units/min Intravenous Once PRN Ganga Johns MD       [2]   Social History  Tobacco Use    Smoking status: Former     Types: Vaping with nicotine     Passive exposure: Never    Smokeless tobacco: Never   Substance Use Topics    Alcohol use: Not Currently     Comment: NO    Drug use: Never     Comment: NO

## 2025-04-25 NOTE — NURSING TRANSFER
Nursing Transfer Note      4/25/2025   6:26 PM    Nurse giving handoff: CANDE Medina  Nurse receiving handoff: CANDE Roman    Reason patient is being transferred: postpartum care    Transfer To: 2107    Transfer via wheelchair    Transfer with belongings    Transported by RN    Transfer Vital Signs:  Blood Pressure: 106/59  Heart Rate: 89  O2: 93%  Temperature: 98.2F  Respirations: 16    Telemetry: n/a  Order for Tele Monitor? No    Additional Lines: IV    Medicines sent: none    Any special needs or follow-up needed: postpartum care    Patient belongings transferred with patient: Yes    Chart send with patient: Yes    Notified: spouse    Patient reassessed at: 4/25/25 @0323 (date, time)  1  Upon arrival to floor: patient oriented to room, call bell in reach, and bed in lowest position

## 2025-04-25 NOTE — ANESTHESIA PREPROCEDURE EVALUATION
04/25/2025  Kandi Gonzáles is a 29 y.o., female.    Problem List[1]    Past Surgical History:   Procedure Laterality Date    APPENDECTOMY      [id number]    GANGLION CYST EXCISION      LAPAROSCOPIC CHOLECYSTECTOMY N/A 04/25/2024    Procedure: CHOLECYSTECTOMY, LAPAROSCOPIC;  Surgeon: Nathen Truong MD;  Location: [facility];  Service: General;  Laterality: N/A;    WISDOM TOOTH EXTRACTION          Tobacco Use:  The patient  reports that she has quit smoking. Her smoking use included vaping with nicotine. She has never been exposed to tobacco smoke. She has never used smokeless tobacco.     Results for orders placed or performed during the hospital encounter of 04/27/24   EKG 12-lead    Collection Time: 04/27/24  5:05 AM   Result Value Ref Range    QRS Duration 98 ms    [facility] QTC Calculation 380 ms    Narrative    Test Reason : R55,    Vent. Rate : 048 BPM     Atrial Rate : 048 BPM     P-R Int : 184 ms          QRS Dur : 098 ms      QT Int : 426 ms       P-R-T Axes : 029 042 041 degrees     QTc Int : 380 ms    Sinus bradycardia with sinus arrhythmia  ST elevation, consider early repolarization  Borderline Abnormal ECG  No previous ECGs available  Confirmed by Jessica AMES, Awais WILKS ([id number]) on 5/18/2024 4:20:32 PM    Referred By: [email or web address]   SELF           Confirmed By:Awais Lopez MD             Lab Results   Component Value Date    WBC 8.07 04/24/2025    HGB 13.1 04/24/2025    HCT 37.8 04/24/2025    MCV 90 04/24/2025     04/24/2025     BMP  Lab Results   Component Value Date     (L) 10/25/2024    K 3.5 10/25/2024     10/25/2024    CO2 19 (L) 10/25/2024    BUN 11 10/25/2024    CREATININE 0.7 10/25/2024    CALCIUM 9.1 10/25/2024    ANIONGAP 10 10/25/2024     (H) 10/25/2024     (H) 04/27/2024     (H) 03/19/2024       Results for orders placed during the hospital encounter  of 04/27/24    Echo    Interpretation Summary    Left Ventricle: The left ventricle is normal in size. Normal wall thickness. There is normal systolic function with a visually estimated ejection fraction of 65 - 70%. There is normal diastolic function.    Right Ventricle: Normal right ventricular cavity size. Wall thickness is normal. Systolic function is normal.    IVC/SVC: Normal venous pressure at 3 mmHg.            Pre-op Assessment    I have reviewed the Patient Summary Reports.     I have reviewed the Nursing Notes. I have reviewed the NPO Status.   I have reviewed the Medications.     Review of Systems  Anesthesia Hx:  No problems with previous Anesthesia             Denies Family Hx of Anesthesia complications.    Denies Personal Hx of Anesthesia complications.                    Social:  Non-Smoker       Hematology/Oncology:  Hematology Normal                                     EENT/Dental:  EENT/Dental Normal           Cardiovascular:  Cardiovascular Normal                                              Pulmonary:  Pulmonary Normal                       Renal/:  Renal/ Normal                 Hepatic/GI:     GERD   Patient is status post cholecystectomy.  She had syncopal episode postop day 1.  Workup did not reveal anything in, so it was suggested it was related to the anesthesia.             Musculoskeletal:  Musculoskeletal Normal                Neurological:  Neurology Normal                                      Endocrine:  Endocrine Normal            Psych:  Psychiatric Normal                    Physical Exam  General: Well nourished    Airway:  Mallampati: II   Mouth Opening: Normal  TM Distance: Normal  Tongue: Normal  Neck ROM: Normal ROM    Dental:  Intact    Chest/Lungs:  Clear to auscultation, Normal Respiratory Rate    Heart:  Rate: Normal  Rhythm: Regular Rhythm        Anesthesia Plan  Type of Anesthesia, risks & benefits discussed:    Anesthesia Type: Epidural  Intra-op Monitoring Plan:  Standard ASA Monitors  Post Op Pain Control Plan: IV/PO Opioids PRN  Informed Consent: Informed consent signed with the Patient and all parties understand the risks and agree with anesthesia plan.  All questions answered.   ASA Score: 2  Anesthesia Plan Notes: Plan is to preload the patient prior to the epidural to avoid any hypotension.    Ready For Surgery From Anesthesia Perspective.     .           [1]   Patient Active Problem List  Diagnosis    Syncope    S/P cholecystectomy

## 2025-04-25 NOTE — L&D DELIVERY NOTE
"VAGINAL DELIVERY NOTE  2025    HOSPITAL COURSE   Kandi Gonzáles is a 29 y.o.  who presented to L&D on 2025 at 39w5d for a scheduled elective IOL.  She received cytotec 25mcg po at 2000hrs and was started on pitocin at 0145hrs with cervix 4cm.  Received PCN for GBS positive status.  Amniotomy was around 0615hrs.     PREGNANCY & DELIVERY SUMMARY   Antepartum complications:  GBS positive  Rh negative   Obesity, BMI 31, normal baseline PCR & HbA1C   GETA delayed reaction (syncope, bradycardia 40's, was 2 days post-op from [facility]), saw Dr. Sanchez who said epidural/anesthesia okay but stressed pre-hydration (message at bottom of this note)    Primary OB Doctor: Dr. Cristin Frazier    Gestational Age @ Delivery:39w6d  G'sP's:      Delivery Type: spontaneous vaginal delivery    Date of Delivery: 2025  Time of Birth: 1511hrs     Delivered By: Dr. Cristin Frazier  Anesthesia: epidural  Intrapartum complications: Shoulder Dystocia, resolved readily with Ghislaine and continued downward pressure, left (posterior) axilla then hooked and delivered to facilitate delivery of large infant chest  Quantitative Blood Loss: 662cc  Laceration / Repair: 2nd degree (3-0 vicryl), R superior labial transverse tear near clitoral davis (4-0 vicryl), left labia with superficial / hemostatic laceration (not repaired)  Placenta: spontaneous    Baby:   Liveborn female "Britton"  Apgars 8/9  Weight pending    Feeding method: breast    Maternal Blood Type:   Lab Results   Component Value Date    GROUPTRH B NEG 2025    INDIRECTCOOM NEG 2025      Maternal Rubella Immunity Status:     Lab Results   Component Value Date    RUBELLAIMMUN Reactive 10/25/2024        Pushing for: ~30 minutes  Episiotomy: no  Position at delivery: OA  Anterior shoulder: right  Nuchal cord: no  Amniotic fluid: terminal meconium  Cord gases: not indicated  Delayed cord clamping x 60 sec: yes  Placenta: spontaneous delivery with cord " traction  Pitocin given: yes  Other uterotonics: methergine 0.2mg IM  Vaginal sweep: negative for retained foreign object  Sponge / instrument counts: correct  Specimens: none  Maternal Condition: stable  Infant Condition: stable    Cristin Frazier MD

## 2025-04-26 LAB
ABSOLUTE EOSINOPHIL (SMH): 0.07 K/UL
ABSOLUTE MONOCYTE (SMH): 0.87 K/UL (ref 0.3–1)
ABSOLUTE NEUTROPHIL COUNT (SMH): 8.6 K/UL (ref 1.8–7.7)
BASOPHILS # BLD AUTO: 0.02 K/UL
BASOPHILS NFR BLD AUTO: 0.2 %
ERYTHROCYTE [DISTWIDTH] IN BLOOD BY AUTOMATED COUNT: 12.8 % (ref 11.5–14.5)
HCT VFR BLD AUTO: 30.4 % (ref 37–48.5)
HGB BLD-MCNC: 10.7 GM/DL (ref 12–16)
IMM GRANULOCYTES # BLD AUTO: 0.09 K/UL (ref 0–0.04)
IMM GRANULOCYTES NFR BLD AUTO: 0.8 % (ref 0–0.5)
LYMPHOCYTES # BLD AUTO: 1.46 K/UL (ref 1–4.8)
MCH RBC QN AUTO: 31.4 PG (ref 27–31)
MCHC RBC AUTO-ENTMCNC: 35.2 G/DL (ref 32–36)
MCV RBC AUTO: 89 FL (ref 82–98)
NUCLEATED RBC (/100WBC) (SMH): 0 /100 WBC
PLATELET # BLD AUTO: 147 K/UL (ref 150–450)
PMV BLD AUTO: 11.4 FL (ref 9.2–12.9)
RBC # BLD AUTO: 3.41 M/UL (ref 4–5.4)
RELATIVE EOSINOPHIL (SMH): 0.6 % (ref 0–8)
RELATIVE LYMPHOCYTE (SMH): 13.1 % (ref 18–48)
RELATIVE MONOCYTE (SMH): 7.8 % (ref 4–15)
RELATIVE NEUTROPHIL (SMH): 77.5 % (ref 38–73)
WBC # BLD AUTO: 11.11 K/UL (ref 3.9–12.7)

## 2025-04-26 PROCEDURE — 85025 COMPLETE CBC W/AUTO DIFF WBC: CPT | Performed by: GENERAL PRACTICE

## 2025-04-26 PROCEDURE — 12000002 HC ACUTE/MED SURGE SEMI-PRIVATE ROOM

## 2025-04-26 PROCEDURE — 36415 COLL VENOUS BLD VENIPUNCTURE: CPT | Performed by: GENERAL PRACTICE

## 2025-04-26 PROCEDURE — 25000003 PHARM REV CODE 250: Performed by: GENERAL PRACTICE

## 2025-04-26 RX ADMIN — IBUPROFEN 800 MG: 400 TABLET ORAL at 11:04

## 2025-04-26 RX ADMIN — PRENATAL VIT W/ FE FUMARATE-FA TAB 27-0.8 MG 1 TABLET: 27-0.8 TAB at 11:04

## 2025-04-26 RX ADMIN — BENZOCAINE AND LEVOMENTHOL: 200; 5 SPRAY TOPICAL at 12:04

## 2025-04-26 RX ADMIN — IBUPROFEN 800 MG: 400 TABLET ORAL at 04:04

## 2025-04-26 NOTE — PLAN OF CARE
OB screen completed using health record. No needs are anticipated at this time. No consults are ordered.           25 0932   OB SCREEN   Assessment Type Discharge Planning Assessment   Source of Information health record   Received Prenatal Care Yes   Any indications/suspicions for None   Is this a teen pregnancy No   Is the baby in NICU No   Indication for adoption/Safe Haven No   Indication for DME/post-acute needs No   HIV (+) No   Any congenital  disorders No   Fetal demise/ death No

## 2025-04-26 NOTE — LACTATION NOTE
04/26/25 1500   Maternal Assessment   Breast Density Bilateral:;soft   Areola Bilateral:;elastic   Nipples Bilateral:;everted   Maternal Infant Feeding   Maternal Emotional State assist needed   Infant Positioning clutch/football   Signs of Milk Transfer audible swallow;infant jaw motion present   Pain with Feeding no   Comfort Measures Before/During Feeding infant position adjusted;latch adjusted;maternal position adjusted   Latch Assistance yes     Assisted with latching infant to right breast utilizing nipple shield. Infant pushed nipple shield out of mouth several times tongue thrusting and nursing off and on. Deep latch was eventually achieved with nipple shield. Mom denies pain with feeding.   Instructed on proper latch to facilitate effective breastfeeding.  Discussed recognizing hunger cues, appropriate positioning and wide mouth latch.  Discussed ways to determine an effective latch including:  areola included in latch, rhythmic/nutritive sucking and audible swallowing.  Also discussed soreness/tenderness associated with latch and prevention and treatment. Instructed to call with further breastfeeding assistance. Mom states understanding and verbalized appropriate recall.

## 2025-04-26 NOTE — ANESTHESIA POSTPROCEDURE EVALUATION
"Anesthesia Post Evaluation    Patient: Kandi Gonzáles    Procedure(s) Performed: * No procedures listed *    Final Anesthesia Type: epidural      Patient location during evaluation: floor  Patient participation: Yes- Able to Participate  Level of consciousness: awake and alert, oriented and awake  Post-procedure vital signs: reviewed and stable  Pain management: adequate  Airway patency: patent    PONV status at discharge: No PONV  Anesthetic complications: no      Cardiovascular status: blood pressure returned to baseline, hemodynamically stable and stable  Respiratory status: unassisted, spontaneous ventilation and room air  Hydration status: euvolemic  Follow-up not needed.  Comments: The patient was found to be awake alert and oriented x4 without evidence or sedation, confusion or respiratory depression at this time.  She states that been able to ambulate well without difficulty.  The patient reports normal sensation except in the distribution of the lateral femoral cutaneous nerve right side were continued mild numbness is reported at this time.  She is without headache or back ache at this time.  The epidural site was examined and found to be clean and dry without evidence of bleeding, infection or hematoma formation.  The patient was informed numbness in this distribution may be related to compression of the lateral femoral cutaneous nerve.  She was informed that the numbness should resolve without difficulty.  The patient is to notify the Department of Anesthesiology if the numbness persists, becomes worse with any signs or symptoms of infection or with any questions, problems or concerns.  The patient demonstrates no anesthesia complications at this time.    1457. The patient reports continued resolution of numbness right anterior lateral thigh in the distribution of lateral femoral cutaneous nerve.  She reports the sensation as" much better".  The patient was informed that numbness has continued to " resolve but if she is to notify the Department of Anesthesiology with any questions, problems concerns.  The patient demonstrates no anesthesia complications at this time.              Vitals Value Taken Time   /66 04/26/25 04:39   Temp 36.5 °C (97.7 °F) 04/26/25 04:39   Pulse 80 04/26/25 04:39   Resp 16 04/26/25 04:39   SpO2 98 % 04/26/25 04:39         No case tracking events are documented in the log.      Pain/Pradip Score: Pain Rating Prior to Med Admin: 3 (4/26/2025  4:42 AM)  Pain Rating Post Med Admin: 0 (4/26/2025  5:42 AM)

## 2025-04-26 NOTE — PROGRESS NOTES
Vaginal Delivery Postpartum Day 1     Kandi Gonzáles is doing well without complaints. Pain well controlled. Tolerating regular diet. Ambulating and voiding without difficulty. She denies any problems with pp blues. States bleeding is not heavy.     OBJECTIVE:     Vitals:    04/26/25 0004 04/26/25 0439 04/26/25 0807 04/26/25 1131   BP: 103/64 100/66 102/62 105/73   BP Location: Right arm Left arm     Patient Position: Sitting Sitting     Pulse: 76 80 76 87   Resp: 16 16     Temp: 98.2 °F (36.8 °C) 97.7 °F (36.5 °C) 97.7 °F (36.5 °C) 97.6 °F (36.4 °C)   TempSrc: Oral Oral Oral Oral   SpO2: 97% 98% 97% 98%   Weight:       Height:            I & O (Last 24H):  Intake/Output Summary (Last 24 hours)    Intake/Output Summary (Last 24 hours) at 4/26/2025 1357  Last data filed at 4/25/2025 1810  Gross per 24 hour   Intake 674.13 ml   Output 2096 ml   Net -1421.87 ml         Physical Exam:  General: NAD, AAO   CV: Regular rate   Resp:   Nonlabored respirations   Abdomen: Patient up and walking with the baby, deferred for today.  Nursing reports firm fundus, appropriate tenderness     Labs:  CBC:   Lab Results   Component Value Date/Time    WBC 11.11 04/26/2025 06:27 AM    RBC 3.41 (L) 04/26/2025 06:27 AM    HGB 10.7 (L) 04/26/2025 06:27 AM    HCT 30.4 (L) 04/26/2025 06:27 AM     (L) 04/26/2025 06:27 AM    MCV 89 04/26/2025 06:27 AM    MCH 31.4 (H) 04/26/2025 06:27 AM    MCHC 35.2 04/26/2025 06:27 AM       ABO/Rh  B NEG    ASSESSMENT/PLAN:     S/p vaginal delivery, PPD# 1. Doing well. Problem List[1]     Routine postpartal care   Dispo: anticipate discharge tomorrow to continue to work on breastfeeding  Angela Lang MD  Obstetrics and Gynecology  [facility]         [1]   Patient Active Problem List  Diagnosis    Syncope    S/P cholecystectomy    Supervision of high risk pregnancy, antepartum

## 2025-04-26 NOTE — LACTATION NOTE
Instructed on the need for a nipple shield and appropriate use:  Nipple Shield Instructions for use:  Wash hands prior to touching the shield  Moisten the edge of the nipple shield with water or lanolin before applying to help it stay in place  Turn shield residential inside out and center over nipple and areola  Slowly roll shield over the nipple and areola and smooth down edges.  The cut-out portion of the contact nipple shield should be positioned under the babys nose.  Hand express a little milk into the teat to facilitate latch.  Latch baby onto breast and shield so that part of the areola is in the babys mouth.  Do not latch baby only onto the teat of the shield.  Breastfeed  until baby is content  While breastfeeding with a nipple shield, baby should be under close supervision for output to monitor adequate transfer of milk and milk supply.  This should be continued until the milk supply is fully established and the infant is consistently gaining weight.    Continued use of, and or weaning from the use of, the nipple shield should be done under the supervision of a health care provider.    Cleaning and Sanitizing:  After each use, rinse in cool water to remove breast milk  Wash in warm, soapy water  Rinse with clear water  Sanitize daily by following the instructions on [employer]s Quick Clean Micro-Steam bag or by boiling for 10min.  The nipple shield should not rest on the bottom or sides of the pot while boiling.  Allow nipple shield to air dry in a clean area and store dry with the nipple facing upward    States understanding and appropriate recall of all information, including return demonstration of use.

## 2025-04-27 VITALS
WEIGHT: 227.06 LBS | DIASTOLIC BLOOD PRESSURE: 78 MMHG | BODY MASS INDEX: 36.49 KG/M2 | SYSTOLIC BLOOD PRESSURE: 116 MMHG | HEIGHT: 66 IN | OXYGEN SATURATION: 86 % | TEMPERATURE: 98 F | RESPIRATION RATE: 18 BRPM | HEART RATE: 167 BPM

## 2025-04-27 PROCEDURE — 25000003 PHARM REV CODE 250: Performed by: GENERAL PRACTICE

## 2025-04-27 RX ORDER — IBUPROFEN 800 MG/1
800 TABLET ORAL EVERY 8 HOURS PRN
Qty: 60 TABLET | Refills: 0 | Status: SHIPPED | OUTPATIENT
Start: 2025-04-27

## 2025-04-27 RX ADMIN — IBUPROFEN 800 MG: 400 TABLET ORAL at 08:04

## 2025-04-27 RX ADMIN — PRENATAL VIT W/ FE FUMARATE-FA TAB 27-0.8 MG 1 TABLET: 27-0.8 TAB at 10:04

## 2025-04-27 NOTE — PLAN OF CARE
Patient in no apparent distress. VSS. Ambulating and voiding without difficulty. Still needing breastfeeding help throughout the night, but putting more effort into trying to do it by herself. No acute events this shift. No additional needs at this time.      Problem: Adult Inpatient Plan of Care  Goal: Plan of Care Review  Outcome: Progressing  Goal: Patient-Specific Goal (Individualized)  Outcome: Progressing  Goal: Absence of Hospital-Acquired Illness or Injury  Outcome: Progressing  Goal: Optimal Comfort and Wellbeing  Outcome: Progressing  Goal: Readiness for Transition of Care  Outcome: Progressing     Problem:  Fall Injury Risk  Goal: Absence of Fall, Infant Drop and Related Injury  Outcome: Progressing     Problem: Infection  Goal: Absence of Infection Signs and Symptoms  Outcome: Progressing     Problem: Postpartum (Vaginal Delivery)  Goal: Successful Parent Role Transition  Outcome: Progressing  Goal: Hemostasis  Outcome: Progressing  Goal: Absence of Infection Signs and Symptoms  Outcome: Progressing  Goal: Anesthesia/Sedation Recovery  Outcome: Progressing  Goal: Optimal Pain Control and Function  Outcome: Progressing  Goal: Effective Urinary Elimination  Outcome: Progressing     Problem: Breastfeeding  Goal: Effective Breastfeeding  Outcome: Progressing

## 2025-04-27 NOTE — DISCHARGE SUMMARY
[facility]  Obstetrics  Discharge Summary      Patient Name: Kandi Gonzáles  MRN: [id number]  Admission Date: 2025  Hospital Length of Stay: 3 days  Discharge Date and Time: No discharge date for patient encounter.  Attending Physician: Cristin Frazier MD   Discharging Provider: Angela Lang MD, MD   Primary Care Provider: Rachid Hudson FNP-C    Hospital Course:   29-year-old  at 39 weeks and 6 days presented for induction of labor.  Underwent vaginal delivery complicated by shoulder dystocia.  See delivery note for more details.  Mother and infant recovered well.  On postpartum day 2 she was meeting all goals for discharge. Pain well controlled, tolerating regular diet, ambulating and voiding without difficulty, passing flatus, no heavy bleeding. VSS and exam reassuring.  Precautions reviewed with patient and she will follow up with Dr. Frazier.    Vitals:    25 2328 25 0419   BP: 98/64 113/78 101/66   BP Location: Right arm Right arm    Patient Position: Sitting Lying    Pulse: 78 94 74   Resp: 18 18 18   Temp: 97.7 °F (36.5 °C) 97.9 °F (36.6 °C) 97.9 °F (36.6 °C)   TempSrc: Oral Oral Oral   SpO2: 97% 97% 98%   Weight:      Height:           Exam:  NAD, AAO  Regular rate  Nonlabored respirations  Ab: fundus firm below the umbilicus, appropriate abdominal tenderness  : appropriate lochia  Extremities:  Nontender calves, no evidence of DVT       Consults (From admission, onward)          Status Ordering Provider     Inpatient consult to Anesthesiology  Once        Provider:  (Not yet assigned)    Acknowledged GABY MÁRQUEZ            Final Active Diagnoses:    Diagnosis Date Noted POA    PRINCIPAL PROBLEM:   (spontaneous vaginal delivery) [O80] 2025 Not Applicable    Supervision of high risk pregnancy, antepartum [O09.90] 2025 Not Applicable      Problems Resolved During this Admission:        Significant Diagnostic Studies:  "Labs: CBC   Recent Labs   Lab 25  0627   WBC 11.11   HGB 10.7*   HCT 30.4*   *     Lab Results   Component Value Date    GROUPTRH B NEG 2025         Feeding Method: both breast and bottle    Immunizations       Date Immunization Status Dose Route/Site Given by    254 Tdap Deferred (Other) 0.5 mL Intramuscular/ Abel Anderson RN    25 Rho (D) Immune Globulin - IM Incomplete 300 mcg Intramuscular/     25 MMR Incomplete 0.5 mL Subcutaneous/             Delivery:    Episiotomy: None   Lacerations: 2nd   Repair suture:     Repair # of packets: 3   Blood loss (ml):       Birth information:  YOB: 2025   Time of birth: 3:11 PM   Sex: female   Delivery type: Vaginal, Spontaneous   Gestational Age: 39w6d     Measurements    Weight: 4115 g  Weight (lbs): 9 lb 1.2 oz  Length: 54 cm  Length (in): 21.25"         Delivery Clinician: Delivery Providers    Delivering clinician: Cristin Frazier MD   Provider Role    Adam Gaitan RN Registered Nurse    Sonya Leos RN Registered Nurse    Chelsie Peres RN Registered Nurse    Letha Diaz, ST Scrub Person    Marco Antonio Elmore RN Registered Nurse             Additional  information:  Forceps:    Vacuum:    Breech:    Observed anomalies      Living?:     Apgars    Living status: Living  Apgar Component Scores:  1 min.:  5 min.:  10 min.:  15 min.:  20 min.:    Skin color:  0  1       Heart rate:  2  2       Reflex irritability:  2  2       Muscle tone:  2  2       Respiratory effort:  2  2       Total:  8  9       Apgars assigned by: MARCO ANTONIO ELMORE RN         Placenta: Delivered:       appearance  Pending Diagnostic Studies:       None            Discharged Condition: good    Disposition: Home or Self Care    Follow Up:   Follow-up Information       Cristin Frazier MD Follow up in 6 week(s).    Specialty: Obstetrics and Gynecology  Why: Postpartum check  Contact information:   [facility] [address]  [facility] [address]  [phone]                           Patient Instructions:      Diet Adult Regular     Pelvic Rest     Notify your health care provider if you experience any of the following:  temperature >100.4     Notify your health care provider if you experience any of the following:  persistent nausea and vomiting or diarrhea     Notify your health care provider if you experience any of the following:  severe uncontrolled pain     Notify your health care provider if you experience any of the following:  difficulty breathing or increased cough     Notify your health care provider if you experience any of the following:  severe persistent headache     Notify your health care provider if you experience any of the following:  worsening rash     Notify your health care provider if you experience any of the following:  persistent dizziness, light-headedness, or visual disturbances     Notify your health care provider if you experience any of the following:  increased confusion or weakness     Activity as tolerated     Medications:  Current Discharge Medication List        START taking these medications    Details   ibuprofen (ADVIL,MOTRIN) 800 MG tablet Take 1 tablet (800 mg total) by mouth every 8 (eight) hours as needed for Other (pain).  Qty: 60 tablet, Refills: 0           CONTINUE these medications which have NOT CHANGED    Details   PNV 29-IRON-FOLIC-DHA-FISH OIL ORAL Take by mouth.           STOP taking these medications       aspirin (ECOTRIN) 81 MG EC tablet Comments:   Reason for Stopping:         famotidine (PEPCID) 20 MG tablet Comments:   Reason for Stopping:               Angela Lang MD, MD  Obstetrics  [facility]

## 2025-04-27 NOTE — DISCHARGE INSTRUCTIONS

## 2025-04-28 ENCOUNTER — TELEPHONE (OUTPATIENT)
Dept: OBSTETRICS AND GYNECOLOGY | Facility: CLINIC | Age: 30
End: 2025-04-28
Payer: COMMERCIAL

## 2025-04-28 NOTE — TELEPHONE ENCOUNTER
Contacted pt to schedule postpartum visit in six wks. Appt scheduled for 6/9/25 @ 2:30 pm. Pt verbalized understanding.

## 2025-04-29 ENCOUNTER — PATIENT OUTREACH (OUTPATIENT)
Dept: ADMINISTRATIVE | Facility: CLINIC | Age: 30
End: 2025-04-29
Payer: COMMERCIAL

## 2025-04-29 NOTE — PROGRESS NOTES
No outreaches to be attempted for this encounter only as the patient is non-applicable for a TCC post-discharge follow up call due to being admitted for childbirth.

## 2025-06-09 ENCOUNTER — POSTPARTUM VISIT (OUTPATIENT)
Dept: OBSTETRICS AND GYNECOLOGY | Facility: CLINIC | Age: 30
End: 2025-06-09
Payer: COMMERCIAL

## 2025-06-09 VITALS
WEIGHT: 205.56 LBS | DIASTOLIC BLOOD PRESSURE: 60 MMHG | SYSTOLIC BLOOD PRESSURE: 100 MMHG | HEIGHT: 66 IN | BODY MASS INDEX: 33.04 KG/M2

## 2025-06-09 PROCEDURE — 99999 PR PBB SHADOW E&M-EST. PATIENT-LVL II: CPT | Mod: PBBFAC,,, | Performed by: GENERAL PRACTICE

## 2025-06-09 PROCEDURE — 0503F POSTPARTUM CARE VISIT: CPT | Mod: CPTII,S$GLB,, | Performed by: GENERAL PRACTICE

## 2025-06-09 NOTE — PROGRESS NOTES
ASSESSMENT AND PLAN     2025  6.5 weeks postpartum, doing well.    Pelvic rest restrictions removed.  Heavy lifting restrictions removed.  Continue PNV as long as breastfeeding.  EPDS reviewed; no interventions indicated.  Cervical Cancer screening: next cervical CA screen (PAP+HPV) due 2027  Encouraged self breast awareness; RTC for breast concerns  Return to clinic: for periodic GYN wellness exam, every 1-3 years per patient preference and comfort level      SUBJECTIVE     2025  Kandi Gonzáles is a 29 y.o. here for a postpartum visit.  She is 6.5 weeks postpartum from a .  See below for relevant details related to the pregnancy, delivery, and hospital course.  Delivery Note is pasted below.  Baby girl Reba is doing well.    She reports:  Vaginal bleeding: none for about 2.5wks  Infant feeding: pumping exclusively  Trouble with perineum: maybe one stitch still just inside vagina  Resumed sex: No  Bowel function normal: Yes  Bladder function normal: Yes  Contraceptive Plan: same-sex relationship    EPDS Score: 2 out of 30    Current Outpatient Medications   Medication Instructions    PNV 29-IRON-FOLIC-DHA-FISH OIL ORAL Take by mouth.     G'sP's:   Delivery Date: 2025  Relationship:  SEP 2022  Contraception: same-sex relationship  PAP Hx: no h/o abnormals  LAST PAP: 2024: PAP neg      OBJECTIVE     PHYSICAL EXAM  Vitals:    25 1434   BP: 100/60     GEN = alert/oriented, nad, cheerful  HEENT = sclera anicteric, EOM grossly normal  BREASTS = deferred   = deferred, no concerns    LABS AND RADS  Lab Results   Component Value Date    GROUPTRH B NEG 2025    INDIRECTCOOM NEG 2025     Cristin Frazier MD    -----------------------    HOSPITAL COURSE   Kandi Gonzáles is a 29 y.o.  who presented to L&D on 2025 at 39w5d for a scheduled elective IOL.  She received cytotec 25mcg po at 2000hrs and was started on pitocin at 0145hrs with cervix 4cm.   "Received PCN for GBS positive status.  Amniotomy was around 0615hrs.   complicated by shoulder dystocia as below.  Met d/c criteria on PPD 2.      PREGNANCY & DELIVERY SUMMARY   Antepartum complications:  GBS positive  Rh negative   Obesity, BMI 31, normal baseline PCR & HbA1C   GETA delayed reaction (syncope, bradycardia 40's, was 2 days post-op from [facility]), saw Dr. Sanchez who said epidural/anesthesia okay but stressed pre-hydration (message at bottom of this note)     Primary OB Doctor: Dr. Cristin Frazier     Gestational Age @ Delivery:39w6d  G'sP's:       Delivery Type: spontaneous vaginal delivery     Date of Delivery: 2025  Time of Birth: 1511hrs      Delivered By: Dr. Cristin Frazier  Anesthesia: epidural  Intrapartum complications: Shoulder Dystocia, resolved readily with Ghislaine and continued downward pressure, left (posterior) axilla then hooked and delivered to facilitate delivery of large infant chest  Quantitative Blood Loss: 662cc  Laceration / Repair: 2nd degree (3-0 vicryl), R superior labial transverse tear near clitoral davis (4-0 vicryl), left labia with superficial / hemostatic laceration (not repaired)  Placenta: spontaneous     Baby:   Liveborn female "Britton"  Apgars 8/9  Weight: 4115 g  Weight (lbs): 9 lb 1.2 oz     Feeding method: breast     Maternal Blood Type:         Lab Results   Component Value Date     GROUPTRH B NEG 2025     INDIRECTCOOM NEG 2025   (Baby was B NEG also)    Maternal Rubella Immunity Status:           Lab Results   Component Value Date     RUBELLAIMMUN Reactive 10/25/2024         Pushing for: ~30 minutes  Episiotomy: no  Position at delivery: OA  Anterior shoulder: right  Nuchal cord: no  Amniotic fluid: terminal meconium  Cord gases: not indicated  Delayed cord clamping x 60 sec: yes  Placenta: spontaneous delivery with cord traction  Pitocin given: yes  Other uterotonics: methergine 0.2mg IM  Vaginal sweep: negative for retained foreign " object  Sponge / instrument counts: correct  Specimens: none  Maternal Condition: stable  Infant Condition: stable     Cristin Frazier MD   O-Z Flap Text: The defect edges were debeveled with a #15 scalpel blade.  Given the location of the defect, shape of the defect and the proximity to free margins an O-Z flap was deemed most appropriate.  Using a sterile surgical marker, an appropriate transposition flap was drawn incorporating the defect and placing the expected incisions within the relaxed skin tension lines where possible. The area thus outlined was incised deep to adipose tissue with a #15 scalpel blade.  The skin margins were undermined to an appropriate distance in all directions utilizing iris scissors.

## 2025-07-05 ENCOUNTER — PATIENT MESSAGE (OUTPATIENT)
Dept: OBSTETRICS AND GYNECOLOGY | Facility: CLINIC | Age: 30
End: 2025-07-05
Payer: COMMERCIAL

## 2025-08-14 ENCOUNTER — OFFICE VISIT (OUTPATIENT)
Dept: URGENT CARE | Facility: CLINIC | Age: 30
End: 2025-08-14
Payer: COMMERCIAL

## 2025-08-14 VITALS
DIASTOLIC BLOOD PRESSURE: 74 MMHG | HEIGHT: 66 IN | HEART RATE: 78 BPM | OXYGEN SATURATION: 98 % | RESPIRATION RATE: 20 BRPM | BODY MASS INDEX: 32.3 KG/M2 | SYSTOLIC BLOOD PRESSURE: 108 MMHG | WEIGHT: 201 LBS | TEMPERATURE: 98 F

## 2025-08-14 DIAGNOSIS — H57.89 IRRITATION OF LEFT EYE: ICD-10-CM

## 2025-08-14 DIAGNOSIS — H10.9 BACTERIAL CONJUNCTIVITIS OF LEFT EYE: Primary | ICD-10-CM

## 2025-08-14 RX ORDER — OFLOXACIN 3 MG/ML
1 SOLUTION/ DROPS OPHTHALMIC 4 TIMES DAILY
Qty: 5 ML | Refills: 0 | Status: SHIPPED | OUTPATIENT
Start: 2025-08-14 | End: 2025-08-21

## (undated) DEVICE — TROCAR ENDO Z THREAD KII 5X100

## (undated) DEVICE — SUT 0 VICRYL / UR6 (J603)

## (undated) DEVICE — PACK CUSTOM ENDO CHOLO SLI

## (undated) DEVICE — APPLICATOR CHLORAPREP ORN 26ML

## (undated) DEVICE — APPLIER CLIP EPIX UNIV 5X34

## (undated) DEVICE — SET TUBE PNEUMOCLEAR SE HI FLO

## (undated) DEVICE — DISSECTOR CURVED 5DCD

## (undated) DEVICE — GLOVE SENSICARE PI GRN 6.5

## (undated) DEVICE — GLOVE SENSICARE PI ALOE 7.5

## (undated) DEVICE — SOL IRRI STRL WATER 1000ML

## (undated) DEVICE — SCISSOR 5MMX35CM DIRECT DRIVE

## (undated) DEVICE — TROCAR ENDOPATH XCEL 5X100MM

## (undated) DEVICE — SLEEVE SCD EXPRESS KNEE MEDIUM

## (undated) DEVICE — GLOVE SENSICARE PI ALOE 6.5

## (undated) DEVICE — ELECTRODE REM PLYHSV RETURN 9

## (undated) DEVICE — TROCAR KII BLLN 12MM 10CM

## (undated) DEVICE — BLADE SURG #15 CARBON STEEL

## (undated) DEVICE — KIT ANTIFOG W/SPONG & FLUID

## (undated) DEVICE — TOWEL OR DISP STRL BLUE 4/PK

## (undated) DEVICE — SYR 10CC LUER LOCK

## (undated) DEVICE — STRAP OR TABLE 5IN X 72IN

## (undated) DEVICE — NDL SAFETY 21G X 1 1/2 ECLPSE

## (undated) DEVICE — SUT MONOCRYL 4-0 PS-2

## (undated) DEVICE — LINER SUCTION 3000CC

## (undated) DEVICE — STRIP MEDI WND CLSR 1/2X4IN

## (undated) DEVICE — GOWN POLY REINF BRTH SLV XL

## (undated) DEVICE — BAG TISS RETRV MONARCH 10MM